# Patient Record
Sex: FEMALE | Race: WHITE | NOT HISPANIC OR LATINO | Employment: FULL TIME | URBAN - METROPOLITAN AREA
[De-identification: names, ages, dates, MRNs, and addresses within clinical notes are randomized per-mention and may not be internally consistent; named-entity substitution may affect disease eponyms.]

---

## 2017-02-03 ENCOUNTER — TELEPHONE (OUTPATIENT)
Dept: FAMILY MEDICINE | Facility: CLINIC | Age: 47
End: 2017-02-03

## 2017-02-03 NOTE — TELEPHONE ENCOUNTER
L/m, schedule pt for pap/physical or obtain info where pap was done (approx. Date, where, and results)      Letter /liu mailed to pt    Paris Kincaid MA

## 2017-02-03 NOTE — Clinical Note
February 3, 2017      Anuradha Horn  [address]  [place] [facility] [address]              Dear Anuradha Horn,    Our records show that you are due for a pap smear. Please call to schedule an appointment, [phone].  If you have had this completed somewhere else please sign and return the release of records form included, this way our system will no longer flag us that you are due.      Sincerely,    Your [facility] Care Team

## 2017-03-30 ENCOUNTER — OFFICE VISIT (OUTPATIENT)
Dept: FAMILY MEDICINE | Facility: CLINIC | Age: 47
End: 2017-03-30
Payer: COMMERCIAL

## 2017-03-30 VITALS
WEIGHT: 174.4 LBS | HEART RATE: 58 BPM | BODY MASS INDEX: 26.43 KG/M2 | RESPIRATION RATE: 12 BRPM | SYSTOLIC BLOOD PRESSURE: 122 MMHG | HEIGHT: 68 IN | OXYGEN SATURATION: 99 % | TEMPERATURE: 98.1 F | DIASTOLIC BLOOD PRESSURE: 70 MMHG

## 2017-03-30 DIAGNOSIS — Z00.00 ENCOUNTER FOR ROUTINE ADULT HEALTH EXAMINATION WITHOUT ABNORMAL FINDINGS: Primary | ICD-10-CM

## 2017-03-30 DIAGNOSIS — Z12.4 SCREENING FOR CERVICAL CANCER: ICD-10-CM

## 2017-03-30 DIAGNOSIS — Z13.21 ENCOUNTER FOR VITAMIN DEFICIENCY SCREENING: ICD-10-CM

## 2017-03-30 DIAGNOSIS — Z13.220 SCREENING FOR HYPERLIPIDEMIA: ICD-10-CM

## 2017-03-30 DIAGNOSIS — Z12.31 VISIT FOR SCREENING MAMMOGRAM: ICD-10-CM

## 2017-03-30 DIAGNOSIS — D18.00 HEMANGIOMA: ICD-10-CM

## 2017-03-30 DIAGNOSIS — L98.9 SKIN LESION: ICD-10-CM

## 2017-03-30 DIAGNOSIS — Z13.1 SCREENING FOR DIABETES MELLITUS: ICD-10-CM

## 2017-03-30 LAB
CHOLEST SERPL-MCNC: 165 MG/DL
GLUCOSE SERPL-MCNC: 80 MG/DL (ref 70–99)
HDLC SERPL-MCNC: 86 MG/DL
LDLC SERPL CALC-MCNC: 69 MG/DL
NONHDLC SERPL-MCNC: 79 MG/DL
TRIGL SERPL-MCNC: 50 MG/DL

## 2017-03-30 PROCEDURE — 80061 LIPID PANEL: CPT | Performed by: PHYSICIAN ASSISTANT

## 2017-03-30 PROCEDURE — 82306 VITAMIN D 25 HYDROXY: CPT | Performed by: PHYSICIAN ASSISTANT

## 2017-03-30 PROCEDURE — 36415 COLL VENOUS BLD VENIPUNCTURE: CPT | Performed by: PHYSICIAN ASSISTANT

## 2017-03-30 PROCEDURE — 82947 ASSAY GLUCOSE BLOOD QUANT: CPT | Performed by: PHYSICIAN ASSISTANT

## 2017-03-30 PROCEDURE — 87624 HPV HI-RISK TYP POOLED RSLT: CPT | Performed by: PHYSICIAN ASSISTANT

## 2017-03-30 PROCEDURE — G0145 SCR C/V CYTO,THINLAYER,RESCR: HCPCS | Performed by: PHYSICIAN ASSISTANT

## 2017-03-30 PROCEDURE — 99396 PREV VISIT EST AGE 40-64: CPT | Performed by: PHYSICIAN ASSISTANT

## 2017-03-30 NOTE — NURSING NOTE
"Chief Complaint   Patient presents with     Physical     fasting       Initial /70 (BP Location: Right arm, Patient Position: Chair, Cuff Size: Adult Regular)  Pulse 58  Temp 98.1  F (36.7  C) (Oral)  Resp 12  Ht 1.734 m (5' 8.25\")  Wt 79.1 kg (174 lb 6.4 oz)  LMP 04/15/2010  SpO2 99%  BMI 26.32 kg/m2 Estimated body mass index is 26.32 kg/(m^2) as calculated from the following:    Height as of this encounter: 1.734 m (5' 8.25\").    Weight as of this encounter: 79.1 kg (174 lb 6.4 oz).  Medication Reconciliation: Mason Kincaid        "

## 2017-03-30 NOTE — MR AVS SNAPSHOT
After Visit Summary   3/30/2017    Anuradha Horn    MRN: [id number]           Patient Information     Date Of Birth          1970        Visit Information        Provider Department      3/30/2017 9:40 AM Yolanda Smith PA-C [facility]        Today's Diagnoses     Screening for hyperlipidemia    -  1    Screening for diabetes mellitus        Encounter for vitamin deficiency screening        Screening for cervical cancer        Visit for screening mammogram        Skin lesion        Hemangioma          Care Instructions    Schedule mammogram at [facility] ([phone]).    Follow up with dermatology         Preventive Health Recommendations  Female Ages 40 to 49    Yearly exam:     See your health care provider every year in order to  1. Review health changes.   2. Discuss preventive care.    3. Review your medicines if your doctor prescribed any.      Get a Pap test every three years (unless you have an abnormal result and your provider advises testing more often).      If you get Pap tests with HPV test, you only need to test every 5 years, unless you have an abnormal result. You do not need a Pap test if your uterus was removed (hysterectomy) and you have not had cancer.      You should be tested each year for STDs (sexually transmitted diseases), if you're at risk.       Ask your doctor if you should have a mammogram.      Have a colonoscopy (test for colon cancer) if someone in your family has had colon cancer or polyps before age 50.       Have a cholesterol test every 5 years.       Have a diabetes test (fasting glucose) after age 45. If you are at risk for diabetes, you should have this test every 3 years.    Shots: Get a flu shot each year. Get a tetanus shot every 10 years.     Nutrition:     Eat at least 5 servings of fruits and vegetables each day.    Eat whole-grain bread, whole-wheat pasta and brown rice  instead of white grains and rice.    Talk to your provider about Calcium and Vitamin D.     Lifestyle    Exercise at least 150 minutes a week (an average of 30 minutes a day, 5 days a week). This will help you control your weight and prevent disease.    Limit alcohol to one drink per day.    No smoking.     Wear sunscreen to prevent skin cancer.    See your dentist every six months for an exam and cleaning.        Follow-ups after your visit        Additional Services     DERMATOLOGY REFERRAL       Your provider has referred you to: [facility]: Dermatology Clinic [facility] [phone]   [email or web address]  [facility]: [facility] [phone]   [email or web address]    Please be aware that coverage of these services is subject to the terms and limitations of your health insurance plan.  Call member services at your health plan with any benefit or coverage questions.      Please bring the following with you to your appointment:    (1) Any X-Rays, CTs or MRIs which have been performed.  Contact the facility where they were done to arrange for  prior to your scheduled appointment.    (2) List of current medications  (3) This referral request   (4) Any documents/labs given to you for this referral                  Future tests that were ordered for you today     Open Future Orders        Priority Expected Expires Ordered    *MA Screening Digital Bilateral Routine  3/30/2018 3/30/2017            Who to contact     If you have questions or need follow up information about today's clinic visit or your schedule please contact [facility] directly at [phone].  Normal or non-critical lab and imaging results will be communicated to you by MyChart, letter or phone within 4 business days after the clinic has received the results. If you do not hear from us within 7 days, please contact the  "clinic through [employer] or phone. If you have a critical or abnormal lab result, we will notify you by phone as soon as possible.  Submit refill requests through [employer] or call your pharmacy and they will forward the refill request to us. Please allow 3 business days for your refill to be completed.          Additional Information About Your Visit        [employer] Information     [employer] lets you send messages to your doctor, view your test results, renew your prescriptions, schedule appointments and more. To sign up, go to [email or web address] . Click on \"Log in\" on the left side of the screen, which will take you to the Welcome page. Then click on \"Sign up Now\" on the right side of the page.     You will be asked to enter the access code listed below, as well as some personal information. Please follow the directions to create your username and password.     Your access code is: [email or web address]  Expires: 2017 10:04 AM     Your access code will  in 90 days. If you need help or a new code, please call your [place] clinic or [phone].        Care EveryWhere ID     This is your Care EveryWhere ID. This could be used by other organizations to access your [place] medical records  [id number]        Your Vitals Were     Pulse Temperature Respirations Height Last Period Pulse Oximetry    58 98.1  F (36.7  C) (Oral) 12 1.734 m (5' 8.25\") 04/15/2010 99%    BMI (Body Mass Index)                   26.32 kg/m2            Blood Pressure from Last 3 Encounters:   17 122/70   16 120/79   14 104/74    Weight from Last 3 Encounters:   17 79.1 kg (174 lb 6.4 oz)   16 76 kg (167 lb 9.6 oz)   14 75.5 kg (166 lb 8 oz)              We Performed the Following     DERMATOLOGY REFERRAL     Glucose     HPV High Risk Types DNA Cervical     Lipid panel reflex to direct LDL     Pap imaged thin layer screen with HPV - recommended age 30 - 65 years (select HPV order below)     Vitamin D " Deficiency        Primary Care Provider Office Phone #    [facility] [place] [phone]       No address on file        Thank you!     Thank you for choosing [facility]  for your care. Our goal is always to provide you with excellent care. Hearing back from our patients is one way we can continue to improve our services. Please take a few minutes to complete the written survey that you may receive in the mail after your visit with us. Thank you!             Your Updated Medication List - Protect others around you: Learn how to safely use, store and throw away your medicines at www.disposemymeds.org.      Notice  As of 3/30/2017 10:04 AM    You have not been prescribed any medications.

## 2017-03-30 NOTE — PROGRESS NOTES
SUBJECTIVE:     CC: Anuradha Horn is an 46 year old woman who presents for preventive health visit.     Healthy Habits:    Do you get at least three servings of calcium containing foods daily (dairy, green leafy vegetables, etc.)? yes    Amount of exercise or daily activities, outside of work: 3 day(s) per week    Problems taking medications regularly No    Medication side effects: No    Have you had an eye exam in the past two years? yes    Do you see a dentist twice per year? yes    Do you have sleep apnea, excessive snoring or daytime drowsiness?no          Today's PHQ-2 Score:   PHQ-2 ( 1999 [employer]) 3/30/2017 7/24/2014   Q1: Little interest or pleasure in doing things 0 0   Q2: Feeling down, depressed or hopeless 0 0   PHQ-2 Score 0 0       Abuse: Current or Past(Physical, Sexual or Emotional)- No  Do you feel safe in your environment - Yes    Social History   Substance Use Topics     Smoking status: Never Smoker     Smokeless tobacco: Never Used     Alcohol use Yes      Comment: 2 drinks monthly      The patient does not drink >3 drinks per day nor >7 drinks per week.    Recent Labs   Lab Test  07/24/14   1212  05/06/10   0851   CHOL  158  183   HDL  63  74   LDL  89  99   TRIG  33  45   CHOLHDLRATIO  2.5  2.5       Reviewed orders with patient.  Reviewed health maintenance and updated orders accordingly - Yes    Mammo Decision Support:  Patient under age 50, mutual decision reflected in health maintenance.      Pertinent mammograms are reviewed under the imaging tab.  History of abnormal Pap smear: NO - age 30-65 PAP every 5 years with negative HPV co-testing recommended    Reviewed and updated as needed this visit by clinical staff  Tobacco  Allergies         Reviewed and updated as needed this visit by Provider        Periodically will have left eye twitching  No periods. No hot flashes.     ROS:  C: NEGATIVE for fever, chills, change in weight  I: NEGATIVE for worrisome rashes, moles or  "lesions  E: NEGATIVE for vision changes or irritation  ENT: NEGATIVE for ear, mouth and throat problems  R: NEGATIVE for significant cough or SOB  B: NEGATIVE for masses, tenderness or discharge  CV: NEGATIVE for chest pain, palpitations or peripheral edema  GI: NEGATIVE for nausea, abdominal pain, heartburn, or change in bowel habits  : NEGATIVE for unusual urinary or vaginal symptoms. No vaginal bleeding.  M: NEGATIVE for significant arthralgias or myalgia  N: NEGATIVE for weakness, dizziness or paresthesias  P: NEGATIVE for changes in mood or affect     Problem list, Medication list, Allergies, and Medical/Social/Surgical histories reviewed in [facility] and updated as appropriate.  OBJECTIVE:     /70 (BP Location: Right arm, Patient Position: Chair, Cuff Size: Adult Regular)  Pulse 58  Temp 98.1  F (36.7  C) (Oral)  Resp 12  Ht 1.734 m (5' 8.25\")  Wt 79.1 kg (174 lb 6.4 oz)  LMP 04/15/2010  SpO2 99%  BMI 26.32 kg/m2  EXAM:  GENERAL APPEARANCE: healthy, alert and no distress  EYES: Eyes grossly normal to inspection, PERRL and conjunctivae and sclerae normal  HENT: ear canals and TM's normal, nose and mouth without ulcers or lesions, oropharynx clear and oral mucous membranes moist  NECK: no adenopathy, no asymmetry, masses, or scars and thyroid normal to palpation  RESP: lungs clear to auscultation - no rales, rhonchi or wheezes  BREAST: normal without masses, tenderness or nipple discharge and no palpable axillary masses or adenopathy  CV: regular rate and rhythm, normal S1 S2, no S3 or S4, no murmur, click or rub, no peripheral edema and peripheral pulses strong  ABDOMEN: soft, nontender, no hepatosplenomegaly, no masses and bowel sounds normal   (female): normal female external genitalia, normal urethral meatus, vaginal mucosal atrophy noted, normal cervix, adnexae, and uterus without masses or abnormal discharge  MS: no musculoskeletal defects are noted and gait is age appropriate without " "ataxia  SKIN: no suspicious lesions or rashes except some very large hemangiomas on trunk of body and left back with irregular skin lesion- I believe has apearance of seborrheic keratosis but follow up with derm to make sure since would like derm evaluation of hemangiomas as well   NEURO: Normal strength and tone, sensory exam grossly normal, mentation intact and speech normal  PSYCH: mentation appears normal and affect normal/bright    ASSESSMENT/PLAN:     1. Encounter for routine adult health examination without abnormal findings      2. Screening for hyperlipidemia    - Lipid panel reflex to direct LDL    3. Screening for diabetes mellitus    - Glucose    4. Encounter for vitamin deficiency screening    - Vitamin D Deficiency    5. Screening for cervical cancer    - Pap imaged thin layer screen with HPV - recommended age 30 - 65 years (select HPV order below)  - HPV High Risk Types DNA Cervical    6. Visit for screening mammogram  Advised to schedule mammogram.   - *MA Screening Digital Bilateral; Future    7. Skin lesion  Of left back with some irregular features   - DERMATOLOGY REFERRAL    8. Hemangioma  Patient would like removal.    - DERMATOLOGY REFERRAL    COUNSELING:   Reviewed preventive health counseling, as reflected in patient instructions       Regular exercise       Healthy diet/nutrition       Vision screening       Osteoporosis Prevention/Bone Health         reports that she has never smoked. She has never used smokeless tobacco.    Estimated body mass index is 26.32 kg/(m^2) as calculated from the following:    Height as of this encounter: 1.734 m (5' 8.25\").    Weight as of this encounter: 79.1 kg (174 lb 6.4 oz).   Weight management plan: Discussed healthy diet and exercise guidelines and patient will follow up in 12 months in clinic to re-evaluate.    Counseling Resources:  ATP IV Guidelines  Pooled Cohorts Equation Calculator  Breast Cancer Risk Calculator  FRAX Risk Assessment  ICSI " Preventive Guidelines  Dietary Guidelines for Americans, 2010  USDA's MyPlate  ASA Prophylaxis  Lung CA Screening    Yolanda Smith PA-C  [facility]

## 2017-03-30 NOTE — PATIENT INSTRUCTIONS
Schedule mammogram at [facility] ([phone]).    Follow up with dermatology         Preventive Health Recommendations  Female Ages 40 to 49    Yearly exam:     See your health care provider every year in order to  1. Review health changes.   2. Discuss preventive care.    3. Review your medicines if your doctor prescribed any.      Get a Pap test every three years (unless you have an abnormal result and your provider advises testing more often).      If you get Pap tests with HPV test, you only need to test every 5 years, unless you have an abnormal result. You do not need a Pap test if your uterus was removed (hysterectomy) and you have not had cancer.      You should be tested each year for STDs (sexually transmitted diseases), if you're at risk.       Ask your doctor if you should have a mammogram.      Have a colonoscopy (test for colon cancer) if someone in your family has had colon cancer or polyps before age 50.       Have a cholesterol test every 5 years.       Have a diabetes test (fasting glucose) after age 45. If you are at risk for diabetes, you should have this test every 3 years.    Shots: Get a flu shot each year. Get a tetanus shot every 10 years.     Nutrition:     Eat at least 5 servings of fruits and vegetables each day.    Eat whole-grain bread, whole-wheat pasta and brown rice instead of white grains and rice.    Talk to your provider about Calcium and Vitamin D.     Lifestyle    Exercise at least 150 minutes a week (an average of 30 minutes a day, 5 days a week). This will help you control your weight and prevent disease.    Limit alcohol to one drink per day.    No smoking.     Wear sunscreen to prevent skin cancer.    See your dentist every six months for an exam and cleaning.

## 2017-03-31 LAB — DEPRECATED CALCIDIOL+CALCIFEROL SERPL-MC: 28 UG/L (ref 20–75)

## 2017-04-03 LAB
COPATH REPORT: NORMAL
PAP: NORMAL

## 2017-04-05 LAB
FINAL DIAGNOSIS: NORMAL
HPV HR 12 DNA CVX QL NAA+PROBE: NEGATIVE
HPV16 DNA SPEC QL NAA+PROBE: NEGATIVE
HPV18 DNA SPEC QL NAA+PROBE: NEGATIVE
SPECIMEN DESCRIPTION: NORMAL

## 2017-04-18 ENCOUNTER — RADIANT APPOINTMENT (OUTPATIENT)
Dept: MAMMOGRAPHY | Facility: CLINIC | Age: 47
End: 2017-04-18
Attending: PHYSICIAN ASSISTANT
Payer: COMMERCIAL

## 2017-04-18 DIAGNOSIS — Z12.31 VISIT FOR SCREENING MAMMOGRAM: ICD-10-CM

## 2017-04-18 PROCEDURE — G0202 SCR MAMMO BI INCL CAD: HCPCS

## 2018-11-05 ENCOUNTER — OFFICE VISIT (OUTPATIENT)
Dept: FAMILY MEDICINE | Facility: CLINIC | Age: 48
End: 2018-11-05
Payer: COMMERCIAL

## 2018-11-05 VITALS
DIASTOLIC BLOOD PRESSURE: 79 MMHG | HEIGHT: 69 IN | SYSTOLIC BLOOD PRESSURE: 120 MMHG | OXYGEN SATURATION: 96 % | TEMPERATURE: 98.7 F | BODY MASS INDEX: 27.16 KG/M2 | WEIGHT: 183.4 LBS | RESPIRATION RATE: 18 BRPM | HEART RATE: 62 BPM

## 2018-11-05 DIAGNOSIS — G43.109 MIGRAINE WITH AURA AND WITHOUT STATUS MIGRAINOSUS, NOT INTRACTABLE: ICD-10-CM

## 2018-11-05 DIAGNOSIS — N95.2 ATROPHIC VAGINITIS: ICD-10-CM

## 2018-11-05 DIAGNOSIS — R94.6 ABNORMAL THYROID EXAM: ICD-10-CM

## 2018-11-05 DIAGNOSIS — Z23 NEED FOR PROPHYLACTIC VACCINATION AND INOCULATION AGAINST INFLUENZA: ICD-10-CM

## 2018-11-05 DIAGNOSIS — Z00.00 ENCOUNTER FOR ROUTINE ADULT HEALTH EXAMINATION WITHOUT ABNORMAL FINDINGS: Primary | ICD-10-CM

## 2018-11-05 DIAGNOSIS — K80.20 CALCULUS OF GALLBLADDER WITHOUT CHOLECYSTITIS WITHOUT OBSTRUCTION: ICD-10-CM

## 2018-11-05 LAB
CHOLEST SERPL-MCNC: 171 MG/DL
GLUCOSE SERPL-MCNC: 86 MG/DL (ref 70–99)
HDLC SERPL-MCNC: 77 MG/DL
LDLC SERPL CALC-MCNC: 83 MG/DL
NONHDLC SERPL-MCNC: 94 MG/DL
TRIGL SERPL-MCNC: 57 MG/DL
TSH SERPL DL<=0.005 MIU/L-ACNC: 1.39 MU/L (ref 0.4–4)

## 2018-11-05 PROCEDURE — 99396 PREV VISIT EST AGE 40-64: CPT | Performed by: FAMILY MEDICINE

## 2018-11-05 PROCEDURE — 80061 LIPID PANEL: CPT | Performed by: FAMILY MEDICINE

## 2018-11-05 PROCEDURE — 36415 COLL VENOUS BLD VENIPUNCTURE: CPT | Performed by: FAMILY MEDICINE

## 2018-11-05 PROCEDURE — 82947 ASSAY GLUCOSE BLOOD QUANT: CPT | Performed by: FAMILY MEDICINE

## 2018-11-05 PROCEDURE — 84443 ASSAY THYROID STIM HORMONE: CPT | Performed by: FAMILY MEDICINE

## 2018-11-05 RX ORDER — ESTRADIOL 0.1 MG/G
CREAM VAGINAL
Qty: 42.5 G | Refills: 3 | Status: SHIPPED | OUTPATIENT
Start: 2018-11-05 | End: 2020-01-29

## 2018-11-05 ASSESSMENT — PAIN SCALES - GENERAL: PAINLEVEL: NO PAIN (0)

## 2018-11-05 NOTE — PATIENT INSTRUCTIONS
"Use Estrace cream nightly for a couple weeks and then after that use a couple times a week for maintenance.     Please call [facility] (formerly called [facility]) at [phone] to schedule your mammogram and thyroid ultrasound.     I recommend keeping a food and activity diary. Keeping track of what you eat will help you to be more aware of the choices you're making and can assist with weight loss. There are many phone apps to do this, a popular one is [employer]. It will break down what you eat into carbs, fat, and protein and help you set goals.     I recommend a Vitamin D supplement since it's our \"sun vitamin\" and most [place] don't get enough sun. You can find it over the counter. Take 1000 units daily.         Preventive Health Recommendations  Female Ages 40 to 49    Yearly exam:     See your health care provider every year in order to  1. Review health changes.   2. Discuss preventive care.    3. Review your medicines if your doctor prescribed any.      Get a Pap test every three years (unless you have an abnormal result and your provider advises testing more often).      If you get Pap tests with HPV test, you only need to test every 5 years, unless you have an abnormal result. You do not need a Pap test if your uterus was removed (hysterectomy) and you have not had cancer.      You should be tested each year for STDs (sexually transmitted diseases), if you're at risk.     Ask your doctor if you should have a mammogram.      Have a colonoscopy (test for colon cancer) if someone in your family has had colon cancer or polyps before age 50.       Have a cholesterol test every 5 years.       Have a diabetes test (fasting glucose) after age 45. If you are at risk for diabetes, you should have this test every 3 years.    Shots: Get a flu shot each year. Get a tetanus shot every 10 years.     Nutrition:     Eat at least 5 servings of fruits and vegetables " each day.    Eat whole-grain bread, whole-wheat pasta and brown rice instead of white grains and rice.    Get adequate Calcium and Vitamin D.      Lifestyle    Exercise at least 150 minutes a week (an average of 30 minutes a day, 5 days a week). This will help you control your weight and prevent disease.    Limit alcohol to one drink per day.    No smoking.     Wear sunscreen to prevent skin cancer.    See your dentist every six months for an exam and cleaning.

## 2018-11-05 NOTE — PROGRESS NOTES
"   SUBJECTIVE:   CC: Anuradha Horn is an 48 year old woman who presents for preventive health visit.     Healthy Habits:    Do you get at least three servings of calcium containing foods daily (dairy, green leafy vegetables, etc.)? yes    Amount of exercise or daily activities, outside of work: 3 day(s) per week    Problems taking medications regularly No    Medication side effects: No    Have you had an eye exam in the past two years? yes    Do you see a dentist twice per year? yes    Do you have sleep apnea, excessive snoring or daytime drowsiness?no    :  -Patient had episode of right flank pain and dysuria and was treated for UTI. She was also seen in ED where she was told she had gallstones but no kidney stones.  She has no current symptoms  -Given estrace cream by OBGYN.  Found the cream was helpful for dryness but has run out and is interested in a refill    Migraines:  -Continue to be managed with Excedrin. Stress triggers migraines. No change in intensity or associated symptoms   -Very occasionally has aura with migraine, described as \"seeing stars\"     Weight:  -Patient feels it has been more challenging to lose weight since menopause  -In the past patient could manage weight with diet but feels she now needs more exercise too. She uses the treadmill very occasionally     Wt Readings from Last 4 Encounters:   11/05/18 83.2 kg (183 lb 6.4 oz)   03/30/17 79.1 kg (174 lb 6.4 oz)   04/21/16 76 kg (167 lb 9.6 oz)   07/24/14 75.5 kg (166 lb 8 oz)         Today's PHQ-2 Score:   PHQ-2 ( 1999 Pfizer) 3/30/2017 7/24/2014   Q1: Little interest or pleasure in doing things 0 0   Q2: Feeling down, depressed or hopeless 0 0   PHQ-2 Score 0 0       Abuse: Current or Past(Physical, Sexual or Emotional)- No  Do you feel safe in your environment - Yes    Social History   Substance Use Topics     Smoking status: Never Smoker     Smokeless tobacco: Never Used     Alcohol use Yes      Comment: 2 drinks monthly  "     If you drink alcohol do you typically have >3 drinks per day or >7 drinks per week? No                     Reviewed orders with patient.  Reviewed health maintenance and updated orders accordingly - Yes  Patient Active Problem List   Diagnosis     CARDIOVASCULAR SCREENING; LDL GOAL LESS THAN 160     Migraines     Past Surgical History:   Procedure Laterality Date     D & C      following artifiscial insemination     SURGICAL HISTORY OF -   age 14    right hip slipped epiphysis       Social History   Substance Use Topics     Smoking status: Never Smoker     Smokeless tobacco: Never Used     Alcohol use Yes      Comment: 2 drinks monthly      Family History   Problem Relation Age of Onset     Hypertension Father      Thyroid Disease Mother      Hypertension Mother      Hypertension Paternal Grandmother      Asthma No family hx of      C.A.D. No family hx of      Diabetes No family hx of      Cerebrovascular Disease No family hx of      Breast Cancer No family hx of      Cancer - colorectal No family hx of      Prostate Cancer No family hx of            Patient under age 50, mutual decision reflected in health maintenance.      Pertinent mammograms are reviewed under the imaging tab.  History of abnormal Pap smear: NO - age 30-65 PAP every 5 years with negative HPV co-testing recommended  PAP / HPV Latest Ref Rng & Units 3/30/2017   PAP - NIL   HPV 16 DNA NEG Negative   HPV 18 DNA NEG Negative   OTHER HR HPV NEG Negative     Reviewed and updated as needed this visit by clinical staff  Tobacco  Allergies  Meds  Med Hx  Surg Hx  Fam Hx  Soc Hx        Reviewed and updated as needed this visit by Provider        Past Medical History:   Diagnosis Date     Infertility female      Normal delivery 05      Past Surgical History:   Procedure Laterality Date     D & C      following artifiscial insemination     SURGICAL HISTORY OF -   age 14    right hip slipped epiphysis       ROS:  Constitutional, HEENT,  "cardiovascular, pulmonary, GI, , musculoskeletal, neuro, skin, endocrine and psych systems are negative, except as otherwise noted.    This document serves as a record of the services and decisions personally performed by IGOR WILKINS. It was created on his/her behalf by Ran Zaidi, a trained medical scribe. The creation of this document is based on the provider's statements to the medical scribe. Ran Zaidi, November 5, 2018 9:21 AM  OBJECTIVE:   /79 (BP Location: Right arm, Patient Position: Sitting, Cuff Size: Adult Regular)  Pulse 62  Temp 98.7  F (37.1  C) (Oral)  Resp 18  Ht 1.74 m (5' 8.5\")  Wt 83.2 kg (183 lb 6.4 oz)  LMP 04/15/2010  SpO2 96%  BMI 27.48 kg/m2  EXAM:  GENERAL APPEARANCE: healthy, alert and no distress  EYES: Eyes grossly normal to inspection, PERRL and conjunctivae and sclerae normal  HENT: ear canals and TM's normal, nose and mouth without ulcers or lesions, oropharynx clear and oral mucous membranes moist  NECK: shotty lymphadenopathy, no asymmetry, masses, or scars. fullness of right lateral thyroid area unclear if enlarged lymph node vs thyroid nodule  RESP: lungs clear to auscultation - no rales, rhonchi or wheezes  BREAST: normal without masses, tenderness or nipple discharge and no palpable axillary masses or adenopathy  CV: regular rate and rhythm, normal S1 S2, no S3 or S4, no murmur, click or rub, no peripheral edema and peripheral pulses strong  ABDOMEN: soft, nontender, no hepatosplenomegaly, no masses and bowel sounds normal   (female): normal female external genitalia, normal urethral meatus, vaginal mucosal atrophy noted, normal cervix, adnexae, and uterus without masses or abnormal discharge  MS: no musculoskeletal defects are noted and gait is age appropriate without ataxia  SKIN: scattered hemangiomas. no suspicious lesions or rashes  NEURO: Normal strength and tone, sensory exam grossly normal, mentation intact and speech normal  PSYCH: " "mentation appears normal and affect normal/bright    ASSESSMENT/PLAN:   1. Encounter for routine adult health examination without abnormal findings  Declined HIV screening as patient has previous negative screen and no risk factors   - Lipid panel reflex to direct LDL Fasting  - Glucose  - TSH with free T4 reflex    2. Calculus of gallbladder without cholecystitis without obstruction  Reviewed asymptomatic, symptoms of biliary colic and reasons for follow-up including emergent follow-up    3. Atrophic vaginitis   improved with estrogen cream. Use cream as needed   - estradiol (ESTRACE VAGINAL) 0.1 MG/GM cream; 1 gm PV Q HS x 2 weeks, then 2-3 x's weekly thereafter  Dispense: 42.5 g; Refill: 3    4. Migraine with aura and without status migrainosus, not intractable  Stable. Controlled with Excedrin as needed     5. Abnormal thyroid exam  Thyroid nodule vs enlarged lymph node, US to eval   - US Thyroid; Future    6. Need for prophylactic vaccination and inoculation against influenza    COUNSELING:   Reviewed preventive health counseling, as reflected in patient instructions  Special attention given to:        Regular exercise       Healthy diet/nutrition       Vision screening       Hearing screening       HIV screeninx in teen years, 1x in adult years, and at intervals if high risk    BP Readings from Last 1 Encounters:   18 120/79     Estimated body mass index is 27.48 kg/(m^2) as calculated from the following:    Height as of this encounter: 1.74 m (5' 8.5\").    Weight as of this encounter: 83.2 kg (183 lb 6.4 oz).      Weight management plan: Discussed healthy diet and exercise guidelines and patient will follow up in 12 months in clinic to re-evaluate.     reports that she has never smoked. She has never used smokeless tobacco.    Patient Instructions   Use Estrace cream nightly for a couple weeks and then after that use a couple times a week for maintenance.     Please call [facility] [place] [facility] at [phone] to schedule your mammogram.     I recommend keeping a food and activity diary. Keeping track of what you eat will help you to be more aware of the choices you're making and can assist with weight loss. There are many phone apps to do this, a popular one is BanyanPal. It will break down what you eat into carbs, fat, and protein and help you set goals.     I recommend a Vitamin D supplement since it's our \"sun vitamin\" and most [place] don't get enough sun. You can find it over the counter. Take 1000 units daily.         Preventive Health Recommendations  Female Ages 40 to 49    Yearly exam:     See your health care provider every year in order to  1. Review health changes.   2. Discuss preventive care.    3. Review your medicines if your doctor prescribed any.      Get a Pap test every three years (unless you have an abnormal result and your provider advises testing more often).      If you get Pap tests with HPV test, you only need to test every 5 years, unless you have an abnormal result. You do not need a Pap test if your uterus was removed (hysterectomy) and you have not had cancer.      You should be tested each year for STDs (sexually transmitted diseases), if you're at risk.     Ask your doctor if you should have a mammogram.      Have a colonoscopy (test for colon cancer) if someone in your family has had colon cancer or polyps before age 50.       Have a cholesterol test every 5 years.       Have a diabetes test (fasting glucose) after age 45. If you are at risk for diabetes, you should have this test every 3 years.    Shots: Get a flu shot each year. Get a tetanus shot every 10 years.     Nutrition:     Eat at least 5 servings of fruits and vegetables each day.    Eat whole-grain bread, whole-wheat pasta and brown rice instead of white grains and rice.    Get adequate Calcium and Vitamin D.      Lifestyle    Exercise at " least 150 minutes a week (an average of 30 minutes a day, 5 days a week). This will help you control your weight and prevent disease.    Limit alcohol to one drink per day.    No smoking.     Wear sunscreen to prevent skin cancer.    See your dentist every six months for an exam and cleaning.    Counseling Resources:  ATP IV Guidelines  Pooled Cohorts Equation Calculator  Breast Cancer Risk Calculator  FRAX Risk Assessment  ICSI Preventive Guidelines  Dietary Guidelines for Americans, 2010  USDA's MyPlate  ASA Prophylaxis  Lung CA Screening    The information in this document, created by the medical scribe for me, accurately reflects the services I personally performed and the decisions made by me. I have reviewed and approved this document for accuracy.   Laine Canas MD  [facility]

## 2018-11-05 NOTE — MR AVS SNAPSHOT
"              After Visit Summary   11/5/2018    Anuradha Horn    MRN: [id number]           Patient Information     Date Of Birth          1970        Visit Information        Provider Department      11/5/2018 8:40 AM Laine Canas MD [facility]        Today's Diagnoses     Encounter for routine adult health examination without abnormal findings    -  1    Calculus of gallbladder without cholecystitis without obstruction        Screening for HIV (human immunodeficiency virus)        Need for prophylactic vaccination and inoculation against influenza        Atrophic vaginitis        Migraine with aura and without status migrainosus, not intractable        Abnormal thyroid exam          Care Instructions    Use Estrace cream nightly for a couple weeks and then after that use a couple times a week for maintenance.     Please call [facility] at [phone] to schedule your mammogram and thyroid ultrasound.     I recommend keeping a food and activity diary. Keeping track of what you eat will help you to be more aware of the choices you're making and can assist with weight loss. There are many phone apps to do this, a popular one is [employer]. It will break down what you eat into carbs, fat, and protein and help you set goals.     I recommend a Vitamin D supplement since it's our \"sun vitamin\" and most [place] don't get enough sun. You can find it over the counter. Take 1000 units daily.         Preventive Health Recommendations  Female Ages 40 to 49    Yearly exam:     See your health care provider every year in order to  1. Review health changes.   2. Discuss preventive care.    3. Review your medicines if your doctor prescribed any.      Get a Pap test every three years (unless you have an abnormal result and your provider advises testing more often).      If you get Pap tests with HPV " test, you only need to test every 5 years, unless you have an abnormal result. You do not need a Pap test if your uterus was removed (hysterectomy) and you have not had cancer.      You should be tested each year for STDs (sexually transmitted diseases), if you're at risk.     Ask your doctor if you should have a mammogram.      Have a colonoscopy (test for colon cancer) if someone in your family has had colon cancer or polyps before age 50.       Have a cholesterol test every 5 years.       Have a diabetes test (fasting glucose) after age 45. If you are at risk for diabetes, you should have this test every 3 years.    Shots: Get a flu shot each year. Get a tetanus shot every 10 years.     Nutrition:     Eat at least 5 servings of fruits and vegetables each day.    Eat whole-grain bread, whole-wheat pasta and brown rice instead of white grains and rice.    Get adequate Calcium and Vitamin D.      Lifestyle    Exercise at least 150 minutes a week (an average of 30 minutes a day, 5 days a week). This will help you control your weight and prevent disease.    Limit alcohol to one drink per day.    No smoking.     Wear sunscreen to prevent skin cancer.    See your dentist every six months for an exam and cleaning.          Follow-ups after your visit        Future tests that were ordered for you today     Open Future Orders        Priority Expected Expires Ordered    US Thyroid Routine  11/5/2019 11/5/2018            Who to contact     If you have questions or need follow up information about today's clinic visit or your schedule please contact [facility] directly at [phone].  Normal or non-critical lab and imaging results will be communicated to you by MyChart, letter or phone within 4 business days after the clinic has received the results. If you do not hear from us within 7 days, please contact the clinic through MyChart or phone. If you have a critical or abnormal lab result, we will notify you  "by phone as soon as possible.  Submit refill requests through [employer] or call your pharmacy and they will forward the refill request to us. Please allow 3 business days for your refill to be completed.          Additional Information About Your Visit        [employer] Information     S[employer]ana lets you send messages to your doctor, view your test results, renew your prescriptions, schedule appointments and more. To sign up, go to [email or web address] . Click on \"Log in\" on the left side of the screen, which will take you to the Welcome page. Then click on \"Sign up Now\" on the right side of the page.     You will be asked to enter the access code listed below, as well as some personal information. Please follow the directions to create your username and password.     Your access code is: [email or web address]  Expires: 2/3/2019  8:25 AM     Your access code will  in 90 days. If you need help or a new code, please call your [facility] clinic or [phone].        Care EveryWhere ID     This is your Care EveryWhere ID. This could be used by other organizations to access your [facility] medical records  [id number]        Your Vitals Were     Pulse Temperature Respirations Height Last Period Pulse Oximetry    62 98.7  F (37.1  C) (Oral) 18 1.74 m (5' 8.5\") 04/15/2010 96%    BMI (Body Mass Index)                   27.48 kg/m2            Blood Pressure from Last 3 Encounters:   18 120/79   17 122/70   16 120/79    Weight from Last 3 Encounters:   18 83.2 kg (183 lb 6.4 oz)   17 79.1 kg (174 lb 6.4 oz)   16 76 kg (167 lb 9.6 oz)              We Performed the Following     Glucose     Lipid panel reflex to direct LDL Fasting     TSH with free T4 reflex          Today's Medication Changes          These changes are accurate as of 18  9:49 AM.  If you have any questions, ask your nurse or doctor.               Start taking these medicines.        Dose/Directions    estradiol 0.1 MG/GM cream "   Commonly known as:  ESTRACE VAGINAL   Used for:  Atrophic vaginitis   Started by:  Laine Canas MD        1 gm PV Q HS x 2 weeks, then 2-3 x's weekly thereafter   Quantity:  42.5 g   Refills:  3            Where to get your medicines      These medications were sent to [facility]/pharmacy #[id number] - [place], [place] - [address] [place] AT [facility]  [address] [facility] [address]     Phone:  [phone]     estradiol 0.1 MG/GM cream                Primary Care Provider Office Phone # Fax #    [facility] [phone] [address] [address]        Equal Access to Services     LUNA TRAVIS : Hadii aad ku hadasho Soomaali, waaxda luqadaha, qaybta kaalmada adeegyada, fatimah perry. So [facility] [phone].    ATENCIÓN: Si habla español, tiene a small disposición servicios gratuitos de asistencia lingüística. [facility] [phone].    We comply with applicable federal civil rights laws and [place] laws. We do not discriminate on the basis of race, color, national origin, age, disability, sex, sexual orientation, or gender identity.            Thank you!     Thank you for choosing [facility]  for your care. Our goal is always to provide you with excellent care. Hearing back from our patients is one way we can continue to improve our services. Please take a few minutes to complete the written survey that you may receive in the mail after your visit with us. Thank you!             Your Updated Medication List - Protect others around you: Learn how to safely use, store and throw away your medicines at www.disposemymeds.org.          This list is accurate as of 11/5/18  9:49 AM.  Always use your most recent med list.                   Brand Name Dispense Instructions for use Diagnosis    estradiol 0.1 MG/GM cream    ESTRACE VAGINAL    42.5 g    1 gm PV Q HS x 2 weeks, then 2-3 x's weekly  thereafter    Atrophic vaginitis

## 2018-11-06 PROBLEM — N95.2 ATROPHIC VAGINITIS: Status: ACTIVE | Noted: 2018-11-06

## 2018-11-07 ENCOUNTER — RADIANT APPOINTMENT (OUTPATIENT)
Dept: ULTRASOUND IMAGING | Facility: CLINIC | Age: 48
End: 2018-11-07
Attending: FAMILY MEDICINE
Payer: COMMERCIAL

## 2018-11-07 ENCOUNTER — RADIANT APPOINTMENT (OUTPATIENT)
Dept: MAMMOGRAPHY | Facility: CLINIC | Age: 48
End: 2018-11-07
Attending: FAMILY MEDICINE
Payer: COMMERCIAL

## 2018-11-07 DIAGNOSIS — R94.6 ABNORMAL THYROID EXAM: ICD-10-CM

## 2018-11-07 DIAGNOSIS — Z12.31 VISIT FOR SCREENING MAMMOGRAM: ICD-10-CM

## 2018-11-07 PROCEDURE — 77063 BREAST TOMOSYNTHESIS BI: CPT

## 2018-11-07 PROCEDURE — 77067 SCR MAMMO BI INCL CAD: CPT

## 2018-11-07 PROCEDURE — 76536 US EXAM OF HEAD AND NECK: CPT

## 2018-11-08 DIAGNOSIS — E04.1 THYROID NODULE: Primary | ICD-10-CM

## 2018-11-14 ENCOUNTER — RADIANT APPOINTMENT (OUTPATIENT)
Dept: ULTRASOUND IMAGING | Facility: CLINIC | Age: 48
End: 2018-11-14
Attending: FAMILY MEDICINE
Payer: COMMERCIAL

## 2018-11-14 DIAGNOSIS — E04.1 THYROID NODULE: ICD-10-CM

## 2018-11-14 PROCEDURE — 76942 ECHO GUIDE FOR BIOPSY: CPT

## 2018-11-14 PROCEDURE — 88173 CYTOPATH EVAL FNA REPORT: CPT

## 2018-11-14 PROCEDURE — 00000102 ZZHCL STATISTIC CYTO WRIGHT STAIN TC

## 2018-11-14 PROCEDURE — 10022 US BIOPSY THYROID FINE NEEDLE ASPIRATION: CPT

## 2018-11-14 RX ORDER — LIDOCAINE HYDROCHLORIDE 10 MG/ML
9 INJECTION, SOLUTION INFILTRATION; PERINEURAL ONCE
Status: COMPLETED | OUTPATIENT
Start: 2018-11-14 | End: 2018-11-14

## 2018-11-14 RX ADMIN — Medication 1 MEQ: at 15:11

## 2018-11-14 RX ADMIN — LIDOCAINE HYDROCHLORIDE 9 ML: 10 INJECTION, SOLUTION INFILTRATION; PERINEURAL at 15:10

## 2018-11-14 NOTE — PROGRESS NOTES
Anuradha was seen in the ultrasound today for a fine needle aspiration of a right thyroid nodule. Procedure was explained and consent was obtained. The entire neck was prepped and sterile drape was applied. 9 ml Lidocaine (NDC 4127-6335-07) with 1 ml 8.4% Na Bicarbonate (NDC 2633-2586-50) was used to anesthetize the skin and subcutaneous tissue.  Procedure was performed with no complications.  Pain at start of procedure 0/10. Pain at end of procedure 0/10  Patient d/c with home care and follow-up instructions.  Procedure performed by: Dr Hill  Other staff present: Petrona

## 2018-11-15 DIAGNOSIS — E04.1 THYROID NODULE: Primary | ICD-10-CM

## 2018-11-15 LAB — COPATH REPORT: NORMAL

## 2018-11-28 ENCOUNTER — OFFICE VISIT (OUTPATIENT)
Dept: DERMATOLOGY | Facility: CLINIC | Age: 48
End: 2018-11-28
Payer: COMMERCIAL

## 2018-11-28 VITALS — DIASTOLIC BLOOD PRESSURE: 80 MMHG | OXYGEN SATURATION: 98 % | SYSTOLIC BLOOD PRESSURE: 119 MMHG | HEART RATE: 60 BPM

## 2018-11-28 DIAGNOSIS — D22.9 MULTIPLE NEVI: ICD-10-CM

## 2018-11-28 DIAGNOSIS — L81.4 LENTIGINES: ICD-10-CM

## 2018-11-28 DIAGNOSIS — D18.01 CHERRY ANGIOMA: ICD-10-CM

## 2018-11-28 DIAGNOSIS — D48.5 NEOPLASM OF UNCERTAIN BEHAVIOR OF SKIN: Primary | ICD-10-CM

## 2018-11-28 DIAGNOSIS — Z80.8 FAMILY HISTORY OF SKIN CANCER: ICD-10-CM

## 2018-11-28 PROCEDURE — 99213 OFFICE O/P EST LOW 20 MIN: CPT | Mod: 25 | Performed by: PHYSICIAN ASSISTANT

## 2018-11-28 PROCEDURE — 11100 HC BIOPSY SKIN/SUBQ/MUC MEM, SINGLE LESION: CPT | Performed by: PHYSICIAN ASSISTANT

## 2018-11-28 PROCEDURE — 88305 TISSUE EXAM BY PATHOLOGIST: CPT | Mod: TC | Performed by: PHYSICIAN ASSISTANT

## 2018-11-28 NOTE — MR AVS SNAPSHOT
After Visit Summary   11/28/2018    Anuradha Horn    MRN: [id number]           Patient Information     Date Of Birth          1970        Visit Information        Provider Department      11/28/2018 11:00 AM Madelyn Angel PA-C [facility]        Today's Diagnoses     Neoplasm of uncertain behavior of skin    -  1    Cherry angioma        Multiple nevi        Lentigines          Care Instructions    Consider botox and/or fillers.  Begin vitamin C and E serum with ferulic acid in the morning  Begin tretinoin(RX) or Differin (OTC): Apply a pea-sized amount to each affected area. Start every 3-4 nights working up to every night.   [employer]mar[employer] cosmeceuticals for products      Wear a sunscreen with at least SPF 30 on your face, ears, neck and V of the chest daily. Wear sunscreen on other areas of the body if those areas are exposed to the sun throughout the day. Sunscreens can contain physical and/or chemical blockers. Physical blockers are less likely to clog pores, these include zinc oxide and titanium dioxide. Reapply every two hour and after swimming. Sunscreen examples include Neutrogena, CeraVe, Blue Lizard, Elta MD and many others.            Proper skin care from [facility]:    -Eliminate harsh soaps as they strip the natural oils from the skin, often resulting in dry itchy skin ( i.e. Dial, Zest, [place] Spring)  -Use mild soaps such as Cetaphil or Dove Sensitive Skin in the shower. You do not need to use soap on arms, legs, and trunk every time you shower unless visibly soiled.   -Avoid hot or cold showers.  -After showering, lightly dry off and apply moisturizing within 2-3 minutes. This will help trap moisture in the skin.   -Aggressive use of a moisturizer at least 1-2 times a day to the entire body (including -Vanicream, Cetaphil, Aquaphor or Cerave) and moisturize hands after every washing.  -We recommend using moisturizers that come in a tub  that needs to be scooped out, not a pump. This has more of an oil base. It will hold moisture in your skin much better than a water base moisturizer. The above recommended are non-pore clogging.      Wear a sunscreen with at least SPF 30 on your face, ears, neck and V of the chest daily. Wear sunscreen on other areas of the body if those areas are exposed to the sun throughout the day. Sunscreens can contain physical and/or chemical blockers. Physical blockers are less likely to clog pores, these include zinc oxide and titanium dioxide. Reapply every two hour and after swimming. Sunscreen examples include Neutrogena, CeraVe, Blue Lizard, Elta MD and many others.    UV radiation  UVA radiation remains constant throughout the day and throughout the year. It is a longer wavelength than UVB and therefore penetrates deeper into the skin leading to immediate and delayed tanning, photoaging, and skin cancer. 70-80% of UVA and UVB radiation occurs between the hours of 10am-2pm.  UVB radiation  UVB radiation causes the most harmful effects and is more significant during the summer months. However, snow and ice can reflect UVB radiation leading to skin damage during the winter months as well. UVB radiation is responsible for tanning, burning, inflammation, delayed erythema (pinkness), pigmentation (brown spots), and skin cancer.   Just because you do not burn or are not developing a tan does not mean that you are not damaging your skin. A 15 minute drive to and from work for 30 years an lead to chronic sun damage of the skin. It is important to wear a broad spectrum (both UVA and UVB) sunscreen EVERY day with at least 30 SPF. Apply to face, ears, neck and v of the chest as this is where most of our sun exposure is. Reapply sunscreen every two hours if you plan on being outside.   Yeison Dewitt. Clinical Dermatology: A Color Guide to Diagnosis and Therapy. Elsevier, 2016.                  Wound Care Instructions     FOR  SUPERFICIAL WOUNDS     [facility] [phone]    [facility] [phone]          AFTER 24 HOURS YOU SHOULD REMOVE THE BANDAGE AND BEGIN DAILY DRESSING CHANGES AS FOLLOWS:     1) Remove Dressing.     2) Clean and dry the area with tap water using a Q-tip or sterile gauze pad.     3) Apply Vaseline, Aquaphor, Polysporin ointment or Bacitracin ointment over entire wound.  Do NOT use Neosporin ointment.     4) Cover the wound with a band-aid, or a sterile non-stick gauze pad and micropore paper tape      REPEAT THESE INSTRUCTIONS AT LEAST ONCE A DAY UNTIL THE WOUND HAS COMPLETELY HEALED.    It is an old wives tale that a wound heals better when it is exposed to air and allowed to dry out. The wound will heal faster with a better cosmetic result if it is kept moist with ointment and covered with a bandage.    **Do not let the wound dry out.**      Supplies Needed:      *Cotton tipped applicators (Q-tips)    *Polysporin Ointment or Bacitracin Ointment (NOT NEOSPORIN)    *Band-aids or non-stick gauze pads and micropore paper tape.      PATIENT INFORMATION:    During the healing process you will notice a number of changes. All wounds develop a small halo of redness surrounding the wound.  This means healing is occurring. Severe itching with extensive redness usually indicates sensitivity to the ointment or bandage tape used to dress the wound.  You should call our office if this develops.      Swelling  and/or discoloration around your surgical site is common, particularly when performed around the eye.    All wounds normally drain.  The larger the wound the more drainage there will be.  After 7-10 days, you will notice the wound beginning to shrink and new skin will begin to grow.  The wound is healed when you can see skin has formed over the entire area.  A healed wound has a healthy, shiny look to the surface and is red to dark pink in color to normalize.  Wounds may take approximately 4-6  weeks to heal.  Larger wounds may take 6-8 weeks.  After the wound is healed you may discontinue dressing changes.    You may experience a sensation of tightness as your wound heals. This is normal and will gradually subside.    Your healed wound may be sensitive to temperature changes. This sensitivity improves with time, but if you re having a lot of discomfort, try to avoid temperature extremes.    Patients frequently experience itching after their wound appears to have healed because of the continue healing under the skin.  Plain Vaseline will help relieve the itching.        POSSIBLE COMPLICATIONS    BLEEDIN. Leave the bandage in place.  2. Use tightly rolled up gauze or a cloth to apply direct pressure over the bandage for 30  minutes.  3. Reapply pressure for an additional 30 minutes if necessary  4. Use additional gauze and tape to maintain pressure once the bleeding has stopped.              Follow-ups after your visit        Your next 10 appointments already scheduled     2019  4:00 PM CST   New Visit with David [facility]ard MD   [facility]    [address] [place] [id number] [phone]              Who to contact     If you have questions or need follow up information about today's clinic visit or your schedule please contact [facility] directly at [phone].  Normal or non-critical lab and imaging results will be communicated to you by MyChart, letter or phone within 4 business days after the clinic has received the results. If you do not hear from us within 7 days, please contact the clinic through MyChart or phone. If you have a critical or abnormal lab result, we will notify you by phone as soon as possible.  Submit refill requests through [employer] or call your pharmacy and they will forward the refill request to us. Please allow 3 business days for your refill to be completed.           Additional Information About Your Visit        MyChart Information     [employer] gives you secure access to your electronic health record. If you see a primary care provider, you can also send messages to your care team and make appointments. If you have questions, please call your primary care clinic.  If you do not have a primary care provider, please call [phone] and they will assist you.        Care EveryWhere ID     This is your Care EveryWhere ID. This could be used by other organizations to access your [facility] medical records  [id number]        Your Vitals Were     Pulse Last Period Pulse Oximetry             60 04/15/2010 98%          Blood Pressure from Last 3 Encounters:   11/28/18 119/80   11/05/18 120/79   03/30/17 122/70    Weight from Last 3 Encounters:   11/05/18 83.2 kg (183 lb 6.4 oz)   03/30/17 79.1 kg (174 lb 6.4 oz)   04/21/16 76 kg (167 lb 9.6 oz)              We Performed the Following     BIOPSY SKIN/SUBQ/MUC MEM, SINGLE LESION     Dermatological path order and indications        Primary Care Provider Office Phone # Fax #    [facility] [phone] [address] [facility] [address]        Equal Access to Services     LUNA TRAVIS : Hadii aad ku hadasho Soomaali, waaxda luqadaha, qaybta kaalmada adeegyada, waxay idiin hayluannen royce perry. So [facility] [phone].    ATENCIÓN: Si habla español, tiene a small disposición servicios gratuitos de asistencia lingüística. Llame al [phone].    We comply with applicable federal civil rights laws and [place] laws. We do not discriminate on the basis of race, color, national origin, age, disability, sex, sexual orientation, or gender identity.            Thank you!     Thank you for choosing [facility]  for your care. Our goal is always to provide you with excellent care. Hearing back from our patients is one way we can continue to improve our services. Please take a few  minutes to complete the written survey that you may receive in the mail after your visit with us. Thank you!             Your Updated Medication List - Protect others around you: Learn how to safely use, store and throw away your medicines at www.disposemymeds.org.          This list is accurate as of 11/28/18 11:32 AM.  Always use your most recent med list.                   Brand Name Dispense Instructions for use Diagnosis    estradiol 0.1 MG/GM vaginal cream    ESTRACE VAGINAL    42.5 g    1 gm PV Q HS x 2 weeks, then 2-3 x's weekly thereafter    Atrophic vaginitis       VITAMIN C PO       Neoplasm of uncertain behavior of skin

## 2018-11-28 NOTE — LETTER
11/28/2018         RE: Anuradha Horn  [address]  [place] [address]        Dear Colleague,    Thank you for referring your patient, Anuradha Horn, to the [facility]. Please see a copy of my visit note below.    HPI:  Anuradha Horn is a 48 year old female patient here today for spot on right thigh .  Patient states this has been present for years.  Patient reports the following symptoms: growing and rubs on pants .  Patient reports the following previous treatments: none.  Patient reports the following modifying factors: none.  Associated symptoms: none. States she has some signs of aging on her face and would like to discuss cosmetic treatments..  Patient has no other skin complaints today.  Remainder of the HPI, Meds, PMH, Allergies, FH, and SH was reviewed in chart.    Pertinent Hx:   Father had skin cancer and mother has precancers. No personal history of skin cancer.   Past Medical History:   Diagnosis Date     Infertility female      Normal delivery 05       Past Surgical History:   Procedure Laterality Date     D & C      following artifiscial insemination     SURGICAL HISTORY OF -   age 14    right hip slipped epiphysis        Family History   Problem Relation Age of Onset     Hypertension Father      Thyroid Disease Mother      Hypertension Mother      Breast Cancer Mother 75     Hypertension Paternal Grandmother      Asthma No family hx of      C.A.D. No family hx of      Diabetes No family hx of      Cerebrovascular Disease No family hx of      Cancer - colorectal No family hx of      Prostate Cancer No family hx of        Social History     Social History     Marital status:      Spouse name: N/A     Number of children: 2     Years of education: N/A     Occupational History     dentist [employer]     Social History Main Topics     Smoking status: Never Smoker     Smokeless tobacco: Never Used     Alcohol use Yes       Comment: 2 drinks monthly      Drug use: No     Sexual activity: Yes     Partners: Male     Other Topics Concern     Parent/Sibling W/ Cabg, Mi Or Angioplasty Before 65f 55m? No      Service No     Blood Transfusions No     Caffeine Concern No     Occupational Exposure No     Hobby Hazards No     Sleep Concern No     Stress Concern No     Weight Concern No     Special Diet No     Back Care No     Exercise Yes     3 times per week     Bike Helmet No     Seat Belt Yes     Self-Exams Yes     Social History Narrative       Outpatient Encounter Prescriptions as of 11/28/2018   Medication Sig Dispense Refill     Ascorbic Acid (VITAMIN C PO)        estradiol (ESTRACE VAGINAL) 0.1 MG/GM cream 1 gm PV Q HS x 2 weeks, then 2-3 x's weekly thereafter (Patient not taking: Reported on 11/28/2018) 42.5 g 3     No facility-administered encounter medications on file as of 11/28/2018.        Review Of Systems:  Skin: As above  Eyes: negative  Ears/Nose/Throat: negative  Respiratory: No shortness of breath, dyspnea on exertion, cough, or hemoptysis  Cardiovascular: negative  Gastrointestinal: negative  Genitourinary: negative  Musculoskeletal: negative  Neurologic: negative  Psychiatric: negative  Hematologic/Lymphatic/Immunologic: negative  Endocrine: negative      Objective:     /80  Pulse 60  LMP 04/15/2010  SpO2 98%  Eyes: Conjunctivae/lids: Normal   ENT: Lips:  Normal  MSK: Normal  Cardiovascular: Peripheral edema none  Pulm: Breathing Normal  Neuro/Psych: Orientation: Normal; Mood/Affect: Normal, NAD, WDWN  Pt accompanied by: self  Following areas examined:  Scalp, face, eyelids, lips, neck, chest, abdomen, back, buttocks, and R&L upper and lower extremities.  [place] skin type:i   Findings:  Wd smooth red papule on right proximal ventral thigh 0.5cm  Tan WD smooth macules on face, neck, trunk, and extremities.  Well circumscribed macules with symmetric color distribution on trunk and extremities.  Red  smooth well-defined macules on trunk and extremities.    Assessment and Plan:  1)  Neoplasm of uncertain behavior right proximal ventral thigh 0.5cm  Rule out inflamed hemangioma  TANGENTIAL BIOPSY:  After consent, anesthesia with LEC and prep, tangential biopsy performed.  No complications and routine wound care.  May grow back and will get a scar. Based on lesion type may need to completely remove lesion. Patient will be notified in 7-10 days of results. Wound care directions given.  Base of lesion was dried with pressure and gauze and electrocautery for 2-3 s for 1 cycle. Warned risks of blistering, pain, pigment change, scarring, and incomplete resolution.  Advised patient to return if lesions do not completely resolve within 2-3 months.  Wound care sheet given.    2) Lentigines, multiple benign nevi, and cherry angiomas    Treatment of these lesions would be purely cosmetic and not medically neccessary.  Lesion may recur and/or may not completely resolve. May need additional treatment.  Different removal options including excision, cryotherapy, cautery and /or laser.      3) family history of skin cancer  Signs and Symptoms of non-melanoma skin cancer and ABCDEs of melanoma reviewed with patient. Patient encouraged to perform monthly self skin exams and educated on how to perform them. UV precautions reviewed with patient. Patient was asked about new or changing moles/lesions on body.   Wear a sunscreen with at least SPF 30 on your face, ears, neck and V of the chest daily. Wear sunscreen on other areas of the body if those areas are exposed to the sun throughout the day. Sunscreens can contain physical and/or chemical blockers. Physical blockers are less likely to clog pores, these include zinc oxide and titanium dioxide. Reapply every two hour and after swimming. Sunscreen examples include Neutrogena, CeraVe, Blue Lizard, Elta MD and many others.        Follow up in 2 years for FBE.       Again, thank you for  allowing me to participate in the care of your patient.        Sincerely,        Madelyn Angel PA-C

## 2018-11-28 NOTE — PATIENT INSTRUCTIONS
Consider botox and/or fillers.  Begin vitamin C and E serum with ferulic acid in the morning  Begin tretinoin(RX) or Differin (OTC): Apply a pea-sized amount to each affected area. Start every 3-4 nights working up to every night.   [employer] cosmeceuticals for products      Wear a sunscreen with at least SPF 30 on your face, ears, neck and V of the chest daily. Wear sunscreen on other areas of the body if those areas are exposed to the sun throughout the day. Sunscreens can contain physical and/or chemical blockers. Physical blockers are less likely to clog pores, these include zinc oxide and titanium dioxide. Reapply every two hour and after swimming. Sunscreen examples include Neutrogena, CeraVe, Blue Lizard, Elta MD and many others.            Proper skin care from [facility]:    -Eliminate harsh soaps as they strip the natural oils from the skin, often resulting in dry itchy skin ( i.e. Dial, Zest, [place] Spring)  -Use mild soaps such as Cetaphil or Dove Sensitive Skin in the shower. You do not need to use soap on arms, legs, and trunk every time you shower unless visibly soiled.   -Avoid hot or cold showers.  -After showering, lightly dry off and apply moisturizing within 2-3 minutes. This will help trap moisture in the skin.   -Aggressive use of a moisturizer at least 1-2 times a day to the entire body (including -Vanicream, Cetaphil, Aquaphor or Cerave) and moisturize hands after every washing.  -We recommend using moisturizers that come in a tub that needs to be scooped out, not a pump. This has more of an oil base. It will hold moisture in your skin much better than a water base moisturizer. The above recommended are non-pore clogging.      Wear a sunscreen with at least SPF 30 on your face, ears, neck and V of the chest daily. Wear sunscreen on other areas of the body if those areas are exposed to the sun throughout the day. Sunscreens can contain physical and/or chemical blockers. Physical blockers  are less likely to clog pores, these include zinc oxide and titanium dioxide. Reapply every two hour and after swimming. Sunscreen examples include Neutrogena, CeraVe, Blue Lizard, Elta MD and many others.    UV radiation  UVA radiation remains constant throughout the day and throughout the year. It is a longer wavelength than UVB and therefore penetrates deeper into the skin leading to immediate and delayed tanning, photoaging, and skin cancer. 70-80% of UVA and UVB radiation occurs between the hours of 10am-2pm.  UVB radiation  UVB radiation causes the most harmful effects and is more significant during the summer months. However, snow and ice can reflect UVB radiation leading to skin damage during the winter months as well. UVB radiation is responsible for tanning, burning, inflammation, delayed erythema (pinkness), pigmentation (brown spots), and skin cancer.   Just because you do not burn or are not developing a tan does not mean that you are not damaging your skin. A 15 minute drive to and from work for 30 years an lead to chronic sun damage of the skin. It is important to wear a broad spectrum (both UVA and UVB) sunscreen EVERY day with at least 30 SPF. Apply to face, ears, neck and v of the chest as this is where most of our sun exposure is. Reapply sunscreen every two hours if you plan on being outside.   Yeison Dewitt. Clinical Dermatology: A Color Guide to Diagnosis and Therapy. Elsevier, 2016.                  Wound Care Instructions     FOR SUPERFICIAL WOUNDS     [facility] [phone]    [facility] [phone]          AFTER 24 HOURS YOU SHOULD REMOVE THE BANDAGE AND BEGIN DAILY DRESSING CHANGES AS FOLLOWS:     1) Remove Dressing.     2) Clean and dry the area with tap water using a Q-tip or sterile gauze pad.     3) Apply Vaseline, Aquaphor, Polysporin ointment or Bacitracin ointment over entire wound.  Do NOT use Neosporin ointment.     4) Cover the wound with a  band-aid, or a sterile non-stick gauze pad and micropore paper tape      REPEAT THESE INSTRUCTIONS AT LEAST ONCE A DAY UNTIL THE WOUND HAS COMPLETELY HEALED.    It is an old wives tale that a wound heals better when it is exposed to air and allowed to dry out. The wound will heal faster with a better cosmetic result if it is kept moist with ointment and covered with a bandage.    **Do not let the wound dry out.**      Supplies Needed:      *Cotton tipped applicators (Q-tips)    *Polysporin Ointment or Bacitracin Ointment (NOT NEOSPORIN)    *Band-aids or non-stick gauze pads and micropore paper tape.      PATIENT INFORMATION:    During the healing process you will notice a number of changes. All wounds develop a small halo of redness surrounding the wound.  This means healing is occurring. Severe itching with extensive redness usually indicates sensitivity to the ointment or bandage tape used to dress the wound.  You should call our office if this develops.      Swelling  and/or discoloration around your surgical site is common, particularly when performed around the eye.    All wounds normally drain.  The larger the wound the more drainage there will be.  After 7-10 days, you will notice the wound beginning to shrink and new skin will begin to grow.  The wound is healed when you can see skin has formed over the entire area.  A healed wound has a healthy, shiny look to the surface and is red to dark pink in color to normalize.  Wounds may take approximately 4-6 weeks to heal.  Larger wounds may take 6-8 weeks.  After the wound is healed you may discontinue dressing changes.    You may experience a sensation of tightness as your wound heals. This is normal and will gradually subside.    Your healed wound may be sensitive to temperature changes. This sensitivity improves with time, but if you re having a lot of discomfort, try to avoid temperature extremes.    Patients frequently experience itching after their wound  appears to have healed because of the continue healing under the skin.  Plain Vaseline will help relieve the itching.        POSSIBLE COMPLICATIONS    BLEEDIN. Leave the bandage in place.  2. Use tightly rolled up gauze or a cloth to apply direct pressure over the bandage for 30  minutes.  3. Reapply pressure for an additional 30 minutes if necessary  4. Use additional gauze and tape to maintain pressure once the bleeding has stopped.

## 2018-11-28 NOTE — PROGRESS NOTES
HPI:  Anuradha Horn is a 48 year old female patient here today for spot on right thigh .  Patient states this has been present for years.  Patient reports the following symptoms: growing and rubs on pants .  Patient reports the following previous treatments: none.  Patient reports the following modifying factors: none.  Associated symptoms: none. States she has some signs of aging on her face and would like to discuss cosmetic treatments..  Patient has no other skin complaints today.  Remainder of the HPI, Meds, PMH, Allergies, FH, and SH was reviewed in chart.    Pertinent Hx:   Father had skin cancer and mother has precancers. No personal history of skin cancer.   Past Medical History:   Diagnosis Date     Infertility female      Normal delivery 05       Past Surgical History:   Procedure Laterality Date     D & C      following artifiscial insemination     SURGICAL HISTORY OF -   age 14    right hip slipped epiphysis        Family History   Problem Relation Age of Onset     Hypertension Father      Thyroid Disease Mother      Hypertension Mother      Breast Cancer Mother 75     Hypertension Paternal Grandmother      Asthma No family hx of      C.A.D. No family hx of      Diabetes No family hx of      Cerebrovascular Disease No family hx of      Cancer - colorectal No family hx of      Prostate Cancer No family hx of        Social History     Social History     Marital status:      Spouse name: N/A     Number of children: 2     Years of education: N/A     Occupational History     dentist [employer]     Social History Main Topics     Smoking status: Never Smoker     Smokeless tobacco: Never Used     Alcohol use Yes      Comment: 2 drinks monthly      Drug use: No     Sexual activity: Yes     Partners: Male     Other Topics Concern     Parent/Sibling W/ Cabg, Mi Or Angioplasty Before 65f 55m? No      Service No     Blood Transfusions No     Caffeine Concern No      Occupational Exposure No     Hobby Hazards No     Sleep Concern No     Stress Concern No     Weight Concern No     Special Diet No     Back Care No     Exercise Yes     3 times per week     Bike Helmet No     Seat Belt Yes     Self-Exams Yes     Social History Narrative       Outpatient Encounter Prescriptions as of 11/28/2018   Medication Sig Dispense Refill     Ascorbic Acid (VITAMIN C PO)        estradiol (ESTRACE VAGINAL) 0.1 MG/GM cream 1 gm PV Q HS x 2 weeks, then 2-3 x's weekly thereafter (Patient not taking: Reported on 11/28/2018) 42.5 g 3     No facility-administered encounter medications on file as of 11/28/2018.        Review Of Systems:  Skin: As above  Eyes: negative  Ears/Nose/Throat: negative  Respiratory: No shortness of breath, dyspnea on exertion, cough, or hemoptysis  Cardiovascular: negative  Gastrointestinal: negative  Genitourinary: negative  Musculoskeletal: negative  Neurologic: negative  Psychiatric: negative  Hematologic/Lymphatic/Immunologic: negative  Endocrine: negative      Objective:     /80  Pulse 60  LMP 04/15/2010  SpO2 98%  Eyes: Conjunctivae/lids: Normal   ENT: Lips:  Normal  MSK: Normal  Cardiovascular: Peripheral edema none  Pulm: Breathing Normal  Neuro/Psych: Orientation: Normal; Mood/Affect: Normal, NAD, WDWN  Pt accompanied by: self  Following areas examined:  Scalp, face, eyelids, lips, neck, chest, abdomen, back, buttocks, and R&L upper and lower extremities.  [place] skin type:i   Findings:  Wd smooth red papule on right proximal ventral thigh 0.5cm  Tan WD smooth macules on face, neck, trunk, and extremities.  Well circumscribed macules with symmetric color distribution on trunk and extremities.  Red smooth well-defined macules on trunk and extremities.    Assessment and Plan:  1)  Neoplasm of uncertain behavior right proximal ventral thigh 0.5cm  Rule out inflamed hemangioma  TANGENTIAL BIOPSY:  After consent, anesthesia with LEC and prep, tangential  biopsy performed.  No complications and routine wound care.  May grow back and will get a scar. Based on lesion type may need to completely remove lesion. Patient will be notified in 7-10 days of results. Wound care directions given.  Base of lesion was dried with pressure and gauze and electrocautery for 2-3 s for 1 cycle. Warned risks of blistering, pain, pigment change, scarring, and incomplete resolution.  Advised patient to return if lesions do not completely resolve within 2-3 months.  Wound care sheet given.    2) Lentigines, multiple benign nevi, and cherry angiomas    Treatment of these lesions would be purely cosmetic and not medically neccessary.  Lesion may recur and/or may not completely resolve. May need additional treatment.  Different removal options including excision, cryotherapy, cautery and /or laser.      3) family history of skin cancer  Signs and Symptoms of non-melanoma skin cancer and ABCDEs of melanoma reviewed with patient. Patient encouraged to perform monthly self skin exams and educated on how to perform them. UV precautions reviewed with patient. Patient was asked about new or changing moles/lesions on body.   Wear a sunscreen with at least SPF 30 on your face, ears, neck and V of the chest daily. Wear sunscreen on other areas of the body if those areas are exposed to the sun throughout the day. Sunscreens can contain physical and/or chemical blockers. Physical blockers are less likely to clog pores, these include zinc oxide and titanium dioxide. Reapply every two hour and after swimming. Sunscreen examples include Neutrogena, CeraVe, Blue Lizard, Elta MD and many others.        Follow up in 2 years for FBE.

## 2018-11-30 LAB — COPATH REPORT: NORMAL

## 2018-12-03 ENCOUNTER — TELEPHONE (OUTPATIENT)
Dept: DERMATOLOGY | Facility: CLINIC | Age: 48
End: 2018-12-03

## 2018-12-03 NOTE — TELEPHONE ENCOUNTER
Called and LM for patient to call back in regards to biopsy results.    Misael RN-BSN  [facility]  [phone]

## 2018-12-03 NOTE — TELEPHONE ENCOUNTER
Notes Recorded by Madelyn Angel PA-C on 11/30/2018 at 6:31 PM  .Skin, right proximal ventral thigh:   - Hemangioma - benign blood vessel growth  This is a benign lesion that does not need any additional treatment. Please let me know if you have any questions.

## 2018-12-03 NOTE — TELEPHONE ENCOUNTER
Called and spoke to patient. Educated patient on biopsy results- hemangioma (benign blood vessel growth). No further treatment, patient voiced understanding.    Misael RN-BSN  [facility]  [phone]

## 2019-01-20 ENCOUNTER — TRANSFERRED RECORDS (OUTPATIENT)
Dept: HEALTH INFORMATION MANAGEMENT | Facility: CLINIC | Age: 49
End: 2019-01-20

## 2019-01-29 ENCOUNTER — OFFICE VISIT (OUTPATIENT)
Dept: ENDOCRINOLOGY | Facility: CLINIC | Age: 49
End: 2019-01-29
Attending: FAMILY MEDICINE
Payer: COMMERCIAL

## 2019-01-29 VITALS
SYSTOLIC BLOOD PRESSURE: 123 MMHG | BODY MASS INDEX: 27.66 KG/M2 | OXYGEN SATURATION: 97 % | DIASTOLIC BLOOD PRESSURE: 84 MMHG | HEIGHT: 68 IN | WEIGHT: 182.5 LBS | HEART RATE: 68 BPM

## 2019-01-29 DIAGNOSIS — E04.1 THYROID NODULE: Primary | ICD-10-CM

## 2019-01-29 DIAGNOSIS — E28.39 PREMATURE OVARIAN FAILURE: ICD-10-CM

## 2019-01-29 PROCEDURE — 99204 OFFICE O/P NEW MOD 45 MIN: CPT | Performed by: INTERNAL MEDICINE

## 2019-01-29 ASSESSMENT — MIFFLIN-ST. JEOR: SCORE: 1510.56

## 2019-01-29 NOTE — LETTER
1/29/2019         RE: Anuradha Horn  [address]  [facility] [address]        Dear Colleague,    Thank you for referring your patient, Anuradha Horn, to the [facility]. Please see a copy of my visit note below.    Endocrinology Clinic Visit    Chief Complaint: Consult and Thyroid Disease (Nodules)     Information obtained from:Patient    Subjective:         HPI: Anuradha Horn is a 48 year old female with history of Thyroid nodules who is seen in consultation at Laine Loja*'s request for the same.     48-year-old female patient who was found to have a thyroid nodule on routine exam.  Follow-up ultrasound was done in November 2018 which showed multiple nodules described below.  Right lobe:  Nodule 1:  Nodule measurement: 0.8 x 0.3 x 0.7 cm  Nodule 2:  Nodule measurement: 3.5 x 1.8 x 3.6 cm  Isthmus:   Nodule 3:  Nodule measurement: 0.3 x 0.6 x 0.9 cm  Left Lobe:   Nodule 4:  Nodule measurement: 0.4 x 0.3 x 0.5 cm  Nodule 5:  Nodule measurement: 0.6 x 0.4 x 0.6 cm    The nodule on the right side measuring 3.5 x 1.8 x 3.6 cm was biopsied and 11/2018 and results as documented below were benign.    Thyroid, right nodule #2, ultrasound guided fine needle aspiration        Thyroid, right nodule #2, ultrasound guided fine needle aspiration:     Benign   Consistent with a benign nodule (includes adenomatoid nodule, colloid   nodule, etc.)    No family history of thyroid cancer or radiation treatment as a child.  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  Menopause was at the age of 41; because of early menopause she underwent a DEXA scan study 4 years ago.  The Z scores were above -2.    Lumbar spine Z-score in region of L1-L4 = -1.6    HIPS:  Mean total hip Z-score: -1.4   Left femoral neck Z-score = -1.1  Right femoral neck Z-score= Was not obtained.  Radius 33% Z-score = 0.3       No Known Allergies    Current Outpatient Medications    Medication Sig Dispense Refill     Ascorbic Acid (VITAMIN C PO)        Multiple Vitamins-Minerals (DAILY MULTI PO) Take 1 capsule by mouth daily       estradiol (ESTRACE VAGINAL) 0.1 MG/GM cream 1 gm PV Q HS x 2 weeks, then 2-3 x's weekly thereafter (Patient not taking: Reported on 11/28/2018) 42.5 g 3       Review of Systems     11 point review system (Constitutional, HENT, Eyes, Respiratory, Cardiovascular, Gastrointestinal, Genitourinary, Musculoskeletal,Neurological, Psychiatric/Behavioural, Endocrine) is negative or is as per HPI above    Past Medical History:   Diagnosis Date     Infertility female      Normal delivery 05       Past Surgical History:   Procedure Laterality Date     D & C      following artifiscial insemination     SURGICAL HISTORY OF -   age 14    right hip slipped epiphysis       Family History   Problem Relation Age of Onset     Hypertension Father      Thyroid Disease Mother      Hypertension Mother      Breast Cancer Mother 75     Hypertension Paternal Grandmother      Asthma No family hx of      C.A.D. No family hx of      Diabetes No family hx of      Cerebrovascular Disease No family hx of      Cancer - colorectal No family hx of      Prostate Cancer No family hx of        Social History     Socioeconomic History     Marital status:      Spouse name: None     Number of children: 2     Years of education: None     Highest education level: None   Social Needs     Financial resource strain: None     Food insecurity - worry: None     Food insecurity - inability: None     Transportation needs - medical: None     Transportation needs - non-medical: None   Occupational History     Occupation: dentist     Employer: [employer]   Tobacco Use     Smoking status: Never Smoker     Smokeless tobacco: Never Used   Substance and Sexual Activity     Alcohol use: Yes     Comment: 2 drinks monthly      Drug use: No     Sexual activity: Yes     Partners: Male   Other Topics  "Concern     Parent/sibling w/ CABG, MI or angioplasty before 65F 55M? No      Service No     Blood Transfusions No     Caffeine Concern No     Occupational Exposure No     Hobby Hazards No     Sleep Concern No     Stress Concern No     Weight Concern No     Special Diet No     Back Care No     Exercise Yes     Comment: 3 times per week     Bike Helmet No     Seat Belt Yes     Self-Exams Yes   Social History Narrative     None       Objective:   /84 (BP Location: Left arm, Patient Position: Sitting, Cuff Size: Adult Regular)   Pulse 68   Ht 1.726 m (5' 7.95\")   Wt 82.8 kg (182 lb 8 oz)   LMP 04/15/2010   SpO2 97%   BMI 27.79 kg/m     Constitutional: Pleasant no acute cardiopulmonary distress.   EYES: anicteric, normal extra-ocular movements, no lid lag or retraction   HEENT: Mouth/Throat: Mucous membrane is moist. Oropharynx is clear. No adenopathy.  Thyroid slightly enlarged on the right side; palpable nodule on the right side, mobile, firm and nontender.  Cardiovascular: RRR, S1, S2 normal.   Pulmonary/Chest: CTAB. No wheezing or rales   Abdominal: +BS. Non tender to palpation. No organomegaly present.  No stretch marks.  Neurological: Alert and oriented.  No tremor and reflexes are symmetrical bilaterally.  Muscle strength 5/5.   Extremities: No edema.  Lymphatic: no cervical lymphadenopathy.  Psychological: appropriate mood and affect     In House Labs:   No results found for: A1C    TSH   Date Value Ref Range Status   11/05/2018 1.39 0.40 - 4.00 mU/L Final   07/24/2014 1.03 0.4 - 5.0 mU/L Final       Creatinine   Date Value Ref Range Status   05/06/2010 0.75 0.52 - 1.04 mg/dL Final     Comment:     New IDMS-traceable calibration  beginning 5/1/08   ]    Assessment/Treatment Plan:      Multiple thyroid nodules: Dominant right-sided thyroid nodule measuring 3.5 cm; was biopsied previously and it was benign.  Other nodules are less than a centimeter and no indication for biopsy at this point in " time.  Recommended follow-up thyroid ultrasound in November 2019.  Discussed about in general thyroid nodules have, and they are and also indications for a biopsy when we do a biopsy whether the possible findings in detail and written information documented below was provided.  Follow-up ultrasound for November 2019 ordered.  Ordered also TSH and TPO to be checked before her next follow-up.    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  At that time she was just 2 years after menopause.  Would be reasonable to repeat DEXA scan with her next preventive health care.  DEXA scan was ordered.  Optimize calcium and vitamin D intake in the meantime.    Patient Instructions   INFORMATION FOR PATIENTS  THYROID NODULES AND   FINE NEEDLE ASPIRATION BIOPSY OF THE THYROID    The finding of a thyroid nodule is almost never an emergency.  Thyroid nodules are common, occurring in up to 50% of patients over the age of 50 years.      Innocent (not important enough to have been detected during life) thyroid cancer may be found in around 5% of people.   Likewise, about 5-15% of patients with demonstrated thyroid nodules will prove to have thyroid cancer if subjected to aggressive diagnostic measures.  As a rule, thyroid cancer has an excellent prognosis.  Therefore, in each patient with thyroid nodules, the decision has to be made about how important it is to identify and treat a cancer, if present.      When we find nodules on the thyroid we use ultrasound and either palpation or ultrasound guided biopsy to help determine which patients, from the large number with nodules on the thyroid, are in the group containing an important thyroid cancer.      Ultrasound Guided Fine Needle Aspiration Biopsy of the Thyroid uses the ultrasound eye to see the nodule and the needle during the biopsy procedure.  This procedure is performed in the radiology department.  The radiologist uses a small  needle to remove cells from the specific area of the thyroid. The cells are then analyzed under the microscope to determine whether or not they might be cancer.      The results of thyroid biopsy come in 4 categories    1. Benign or negative for cancer.  This is most common result, found in approximately 60-70% of biopsy specimens.  There remains a < 6 % chance that this result is wrong.    2. Positive for cancer.  This is found about 5 % of the time. There remains a  < 1% chance that cancer is not present when we make this diagnosis by biopsy. This result leads to a recommendation for surgery to make the final diagnosis of cancer.     3. Indeterminate, or the grey zone.  This is found in approximately 20-30% of patients.  This diagnosis identifies a higher risk group, often resulting in diagnostic surgery to establish the final diagnosis.  If all patients in this group go to surgery, only 10-30%, on average, prove to have cancer.  This group is subdivided into 3 subcategories: atypia of undetermined significance, follicular neoplasm and suspicious, with increasing risk.      4. Insufficient for diagnosis. This is found in 5-10% of biopsies. When this occurs we need to repeat the biopsy.      The greatest risk of thyroid biopsy is that the result is not clear (that it is in the indeterminate grey zone group), resulting in future thyroid surgery for what is likely to be benign thyroid disease.  There is a small risk of bleeding or infection.                  I will contact the patient with the test results.  Return to clinic in 12 months.    Test and/or medications prescribed today:  Orders Placed This Encounter   Procedures     US Thyroid     Dexa hip/pelvis/spine     Thyroid peroxidase antibody     TSH with free T4 reflex         David Barnard MD  Staff Endocrinologist    [phone]  Division of Endocrinology and Diabetes            Again, thank you for allowing me to participate in the care of your patient.         Sincerely,        David Barnard MD

## 2019-01-29 NOTE — PROGRESS NOTES
Endocrinology Clinic Visit    Chief Complaint: Consult and Thyroid Disease (Nodules)     Information obtained from:Patient    Subjective:         HPI: Anuradha Horn is a 48 year old female with history of Thyroid nodules who is seen in consultation at Laine Loja*'s request for the same.     48-year-old female patient who was found to have a thyroid nodule on routine exam.  Follow-up ultrasound was done in November 2018 which showed multiple nodules described below.  Right lobe:  Nodule 1:  Nodule measurement: 0.8 x 0.3 x 0.7 cm  Nodule 2:  Nodule measurement: 3.5 x 1.8 x 3.6 cm  Isthmus:   Nodule 3:  Nodule measurement: 0.3 x 0.6 x 0.9 cm  Left Lobe:   Nodule 4:  Nodule measurement: 0.4 x 0.3 x 0.5 cm  Nodule 5:  Nodule measurement: 0.6 x 0.4 x 0.6 cm    The nodule on the right side measuring 3.5 x 1.8 x 3.6 cm was biopsied and 11/2018 and results as documented below were benign.    Thyroid, right nodule #2, ultrasound guided fine needle aspiration        Thyroid, right nodule #2, ultrasound guided fine needle aspiration:     Benign   Consistent with a benign nodule (includes adenomatoid nodule, colloid   nodule, etc.)    No family history of thyroid cancer or radiation treatment as a child.  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  Menopause was at the age of 41; because of early menopause she underwent a DEXA scan study 4 years ago.  The Z scores were above -2.    Lumbar spine Z-score in region of L1-L4 = -1.6    HIPS:  Mean total hip Z-score: -1.4   Left femoral neck Z-score = -1.1  Right femoral neck Z-score= Was not obtained.  Radius 33% Z-score = 0.3       No Known Allergies    Current Outpatient Medications   Medication Sig Dispense Refill     Ascorbic Acid (VITAMIN C PO)        Multiple Vitamins-Minerals (DAILY MULTI PO) Take 1 capsule by mouth daily       estradiol (ESTRACE VAGINAL) 0.1 MG/GM cream 1 gm PV Q HS x 2 weeks, then 2-3 x's weekly thereafter (Patient not  taking: Reported on 11/28/2018) 42.5 g 3       Review of Systems     11 point review system (Constitutional, HENT, Eyes, Respiratory, Cardiovascular, Gastrointestinal, Genitourinary, Musculoskeletal,Neurological, Psychiatric/Behavioural, Endocrine) is negative or is as per HPI above    Past Medical History:   Diagnosis Date     Infertility female      Normal delivery 05       Past Surgical History:   Procedure Laterality Date     D & C      following artifiscial insemination     SURGICAL HISTORY OF -   age 14    right hip slipped epiphysis       Family History   Problem Relation Age of Onset     Hypertension Father      Thyroid Disease Mother      Hypertension Mother      Breast Cancer Mother 75     Hypertension Paternal Grandmother      Asthma No family hx of      C.A.D. No family hx of      Diabetes No family hx of      Cerebrovascular Disease No family hx of      Cancer - colorectal No family hx of      Prostate Cancer No family hx of        Social History     Socioeconomic History     Marital status:      Spouse name: None     Number of children: 2     Years of education: None     Highest education level: None   Social Needs     Financial resource strain: None     Food insecurity - worry: None     Food insecurity - inability: None     Transportation needs - medical: None     Transportation needs - non-medical: None   Occupational History     Occupation: dentist     Employer: [employer]   Tobacco Use     Smoking status: Never Smoker     Smokeless tobacco: Never Used   Substance and Sexual Activity     Alcohol use: Yes     Comment: 2 drinks monthly      Drug use: No     Sexual activity: Yes     Partners: Male   Other Topics Concern     Parent/sibling w/ CABG, MI or angioplasty before 65F 55M? No      Service No     Blood Transfusions No     Caffeine Concern No     Occupational Exposure No     Hobby Hazards No     Sleep Concern No     Stress Concern No     Weight Concern No      "Special Diet No     Back Care No     Exercise Yes     Comment: 3 times per week     Bike Helmet No     Seat Belt Yes     Self-Exams Yes   Social History Narrative     None       Objective:   /84 (BP Location: Left arm, Patient Position: Sitting, Cuff Size: Adult Regular)   Pulse 68   Ht 1.726 m (5' 7.95\")   Wt 82.8 kg (182 lb 8 oz)   LMP 04/15/2010   SpO2 97%   BMI 27.79 kg/m    Constitutional: Pleasant no acute cardiopulmonary distress.   EYES: anicteric, normal extra-ocular movements, no lid lag or retraction   HEENT: Mouth/Throat: Mucous membrane is moist. Oropharynx is clear. No adenopathy.  Thyroid slightly enlarged on the right side; palpable nodule on the right side, mobile, firm and nontender.  Cardiovascular: RRR, S1, S2 normal.   Pulmonary/Chest: CTAB. No wheezing or rales   Abdominal: +BS. Non tender to palpation. No organomegaly present.  No stretch marks.  Neurological: Alert and oriented.  No tremor and reflexes are symmetrical bilaterally.  Muscle strength 5/5.   Extremities: No edema.  Lymphatic: no cervical lymphadenopathy.  Psychological: appropriate mood and affect     In House Labs:   No results found for: A1C    TSH   Date Value Ref Range Status   11/05/2018 1.39 0.40 - 4.00 mU/L Final   07/24/2014 1.03 0.4 - 5.0 mU/L Final       Creatinine   Date Value Ref Range Status   05/06/2010 0.75 0.52 - 1.04 mg/dL Final     Comment:     New IDMS-traceable calibration  beginning 5/1/08   ]    Assessment/Treatment Plan:      Multiple thyroid nodules: Dominant right-sided thyroid nodule measuring 3.5 cm; was biopsied previously and it was benign.  Other nodules are less than a centimeter and no indication for biopsy at this point in time.  Recommended follow-up thyroid ultrasound in November 2019.  Discussed about in general thyroid nodules have, and they are and also indications for a biopsy when we do a biopsy whether the possible findings in detail and written information documented below was " provided.  Follow-up ultrasound for November 2019 ordered.  Ordered also TSH and TPO to be checked before her next follow-up.    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  At that time she was just 2 years after menopause.  Would be reasonable to repeat DEXA scan with her next preventive health care.  DEXA scan was ordered.  Optimize calcium and vitamin D intake in the meantime.    Patient Instructions   INFORMATION FOR PATIENTS  THYROID NODULES AND   FINE NEEDLE ASPIRATION BIOPSY OF THE THYROID    The finding of a thyroid nodule is almost never an emergency.  Thyroid nodules are common, occurring in up to 50% of patients over the age of 50 years.      Innocent (not important enough to have been detected during life) thyroid cancer may be found in around 5% of people.   Likewise, about 5-15% of patients with demonstrated thyroid nodules will prove to have thyroid cancer if subjected to aggressive diagnostic measures.  As a rule, thyroid cancer has an excellent prognosis.  Therefore, in each patient with thyroid nodules, the decision has to be made about how important it is to identify and treat a cancer, if present.      When we find nodules on the thyroid we use ultrasound and either palpation or ultrasound guided biopsy to help determine which patients, from the large number with nodules on the thyroid, are in the group containing an important thyroid cancer.      Ultrasound Guided Fine Needle Aspiration Biopsy of the Thyroid uses the ultrasound eye to see the nodule and the needle during the biopsy procedure.  This procedure is performed in the radiology department.  The radiologist uses a small needle to remove cells from the specific area of the thyroid. The cells are then analyzed under the microscope to determine whether or not they might be cancer.      The results of thyroid biopsy come in 4 categories    1. Benign or negative for cancer.  This is most  common result, found in approximately 60-70% of biopsy specimens.  There remains a < 6 % chance that this result is wrong.    2. Positive for cancer.  This is found about 5 % of the time. There remains a  < 1% chance that cancer is not present when we make this diagnosis by biopsy. This result leads to a recommendation for surgery to make the final diagnosis of cancer.     3. Indeterminate, or the grey zone.  This is found in approximately 20-30% of patients.  This diagnosis identifies a higher risk group, often resulting in diagnostic surgery to establish the final diagnosis.  If all patients in this group go to surgery, only 10-30%, on average, prove to have cancer.  This group is subdivided into 3 subcategories: atypia of undetermined significance, follicular neoplasm and suspicious, with increasing risk.      4. Insufficient for diagnosis. This is found in 5-10% of biopsies. When this occurs we need to repeat the biopsy.      The greatest risk of thyroid biopsy is that the result is not clear (that it is in the indeterminate grey zone group), resulting in future thyroid surgery for what is likely to be benign thyroid disease.  There is a small risk of bleeding or infection.                  I will contact the patient with the test results.  Return to clinic in 12 months.    Test and/or medications prescribed today:  Orders Placed This Encounter   Procedures     US Thyroid     Dexa hip/pelvis/spine     Thyroid peroxidase antibody     TSH with free T4 reflex         David Barnard MD  Staff Endocrinologist    [phone]  Division of Endocrinology and Diabetes

## 2019-01-29 NOTE — PATIENT INSTRUCTIONS
INFORMATION FOR PATIENTS  THYROID NODULES AND   FINE NEEDLE ASPIRATION BIOPSY OF THE THYROID    The finding of a thyroid nodule is almost never an emergency.  Thyroid nodules are common, occurring in up to 50% of patients over the age of 50 years.      Innocent (not important enough to have been detected during life) thyroid cancer may be found in around 5% of people.   Likewise, about 5-15% of patients with demonstrated thyroid nodules will prove to have thyroid cancer if subjected to aggressive diagnostic measures.  As a rule, thyroid cancer has an excellent prognosis.  Therefore, in each patient with thyroid nodules, the decision has to be made about how important it is to identify and treat a cancer, if present.      When we find nodules on the thyroid we use ultrasound and either palpation or ultrasound guided biopsy to help determine which patients, from the large number with nodules on the thyroid, are in the group containing an important thyroid cancer.      Ultrasound Guided Fine Needle Aspiration Biopsy of the Thyroid uses the ultrasound eye to see the nodule and the needle during the biopsy procedure.  This procedure is performed in the radiology department.  The radiologist uses a small needle to remove cells from the specific area of the thyroid. The cells are then analyzed under the microscope to determine whether or not they might be cancer.      The results of thyroid biopsy come in 4 categories    1. Benign or negative for cancer.  This is most common result, found in approximately 60-70% of biopsy specimens.  There remains a < 6 % chance that this result is wrong.    2. Positive for cancer.  This is found about 5 % of the time. There remains a  < 1% chance that cancer is not present when we make this diagnosis by biopsy. This result leads to a recommendation for surgery to make the final diagnosis of cancer.     3. Indeterminate, or the grey zone.  This is found in approximately 20-30% of patients.   This diagnosis identifies a higher risk group, often resulting in diagnostic surgery to establish the final diagnosis.  If all patients in this group go to surgery, only 10-30%, on average, prove to have cancer.  This group is subdivided into 3 subcategories: atypia of undetermined significance, follicular neoplasm and suspicious, with increasing risk.      4. Insufficient for diagnosis. This is found in 5-10% of biopsies. When this occurs we need to repeat the biopsy.      The greatest risk of thyroid biopsy is that the result is not clear (that it is in the indeterminate grey zone group), resulting in future thyroid surgery for what is likely to be benign thyroid disease.  There is a small risk of bleeding or infection.

## 2019-01-29 NOTE — NURSING NOTE
"Anuradha Horn's goals for this visit include:   Chief Complaint   Patient presents with     Consult     Thyroid Disease     Nodules     She requests these members of her care team be copied on today's visit information: Yes    PCP: Laine Canas    Referring Provider:  Laine Canas MD  [address] [place] [address]    /84 (BP Location: Left arm, Patient Position: Sitting, Cuff Size: Adult Regular)   Pulse 68   Ht 1.726 m (5' 7.95\")   Wt 82.8 kg (182 lb 8 oz)   LMP 04/15/2010   SpO2 97%   BMI 27.79 kg/m      Do you need any medication refills at today's visit? No    "

## 2019-03-18 ENCOUNTER — MYC MEDICAL ADVICE (OUTPATIENT)
Dept: FAMILY MEDICINE | Facility: CLINIC | Age: 49
End: 2019-03-18

## 2019-03-18 DIAGNOSIS — Z87.898 H/O MOTION SICKNESS: Primary | ICD-10-CM

## 2019-03-18 RX ORDER — SCOLOPAMINE TRANSDERMAL SYSTEM 1 MG/1
1 PATCH, EXTENDED RELEASE TRANSDERMAL
Qty: 4 PATCH | Refills: 0 | Status: SHIPPED | OUTPATIENT
Start: 2019-03-18 | End: 2020-01-29

## 2019-03-18 NOTE — TELEPHONE ENCOUNTER
Reason for Call:  Medication or medication refill:    Do you use a [facility]? NO     Name of the pharmacy and phone number for the current request: [facility] [address]    Name of the medication requested: Scop patch for motion sickness    Other request: med request    Can we leave a detailed message on this number? YES    Phone number patient can be reached at: Cell number on file:    Telephone Information:   Mobile [phone]       Best Time: anytime    Call taken on 3/18/2019 at 3:00 PM by Edwin Hernandez

## 2019-03-18 NOTE — TELEPHONE ENCOUNTER
Requested Prescriptions   Pending Prescriptions Disp Refills     scopolamine (TRANSDERM) 1 MG/3DAYS 72 hr patch       Sig: Place 1 patch onto the skin every 72 hours    There is no refill protocol information for this order        Routing refill request to provider for review/approval because:  Drug not on the [facility] refill protocol   Drug not active on patient's medication list-does not look like this has been prescribed for patient in the past.          Mari Lopez RN, BSN

## 2019-11-05 ENCOUNTER — HEALTH MAINTENANCE LETTER (OUTPATIENT)
Age: 49
End: 2019-11-05

## 2020-01-07 DIAGNOSIS — Z12.39 SCREENING FOR BREAST CANCER: ICD-10-CM

## 2020-01-29 ENCOUNTER — OFFICE VISIT (OUTPATIENT)
Dept: FAMILY MEDICINE | Facility: CLINIC | Age: 50
End: 2020-01-29
Payer: COMMERCIAL

## 2020-01-29 VITALS
TEMPERATURE: 97.8 F | BODY MASS INDEX: 28.66 KG/M2 | OXYGEN SATURATION: 98 % | WEIGHT: 189.1 LBS | HEART RATE: 63 BPM | SYSTOLIC BLOOD PRESSURE: 122 MMHG | RESPIRATION RATE: 16 BRPM | DIASTOLIC BLOOD PRESSURE: 80 MMHG | HEIGHT: 68 IN

## 2020-01-29 DIAGNOSIS — N95.1 MENOPAUSAL SYNDROME (HOT FLASHES): ICD-10-CM

## 2020-01-29 DIAGNOSIS — Z00.00 ROUTINE GENERAL MEDICAL EXAMINATION AT A HEALTH CARE FACILITY: Primary | ICD-10-CM

## 2020-01-29 DIAGNOSIS — E28.39 ESTROGEN DEFICIENCY: ICD-10-CM

## 2020-01-29 DIAGNOSIS — N83.8 DIMINISHED OVARIAN RESERVE: ICD-10-CM

## 2020-01-29 DIAGNOSIS — N95.2 ATROPHIC VAGINITIS: ICD-10-CM

## 2020-01-29 DIAGNOSIS — G43.109 MIGRAINE WITH AURA AND WITHOUT STATUS MIGRAINOSUS, NOT INTRACTABLE: ICD-10-CM

## 2020-01-29 LAB
BASOPHILS # BLD AUTO: 0 10E9/L (ref 0–0.2)
BASOPHILS NFR BLD AUTO: 0.5 %
DIFFERENTIAL METHOD BLD: NORMAL
EOSINOPHIL # BLD AUTO: 0.2 10E9/L (ref 0–0.7)
EOSINOPHIL NFR BLD AUTO: 2.5 %
ERYTHROCYTE [DISTWIDTH] IN BLOOD BY AUTOMATED COUNT: 12.6 % (ref 10–15)
HCT VFR BLD AUTO: 43.4 % (ref 35–47)
HGB BLD-MCNC: 13.9 G/DL (ref 11.7–15.7)
LYMPHOCYTES # BLD AUTO: 2.9 10E9/L (ref 0.8–5.3)
LYMPHOCYTES NFR BLD AUTO: 37.8 %
MCH RBC QN AUTO: 29.8 PG (ref 26.5–33)
MCHC RBC AUTO-ENTMCNC: 32 G/DL (ref 31.5–36.5)
MCV RBC AUTO: 93 FL (ref 78–100)
MONOCYTES # BLD AUTO: 0.6 10E9/L (ref 0–1.3)
MONOCYTES NFR BLD AUTO: 7.5 %
NEUTROPHILS # BLD AUTO: 4 10E9/L (ref 1.6–8.3)
NEUTROPHILS NFR BLD AUTO: 51.7 %
PLATELET # BLD AUTO: 271 10E9/L (ref 150–450)
RBC # BLD AUTO: 4.67 10E12/L (ref 3.8–5.2)
WBC # BLD AUTO: 7.7 10E9/L (ref 4–11)

## 2020-01-29 PROCEDURE — 90471 IMMUNIZATION ADMIN: CPT | Performed by: FAMILY MEDICINE

## 2020-01-29 PROCEDURE — 90714 TD VACC NO PRESV 7 YRS+ IM: CPT | Performed by: FAMILY MEDICINE

## 2020-01-29 PROCEDURE — 85025 COMPLETE CBC W/AUTO DIFF WBC: CPT | Performed by: FAMILY MEDICINE

## 2020-01-29 PROCEDURE — 99396 PREV VISIT EST AGE 40-64: CPT | Mod: 25 | Performed by: FAMILY MEDICINE

## 2020-01-29 PROCEDURE — 36415 COLL VENOUS BLD VENIPUNCTURE: CPT | Performed by: FAMILY MEDICINE

## 2020-01-29 RX ORDER — ESTRADIOL 0.1 MG/G
CREAM VAGINAL
Qty: 42.5 G | Refills: 3 | Status: SHIPPED | OUTPATIENT
Start: 2020-01-29 | End: 2021-06-21

## 2020-01-29 ASSESSMENT — MIFFLIN-ST. JEOR: SCORE: 1536.25

## 2020-01-29 ASSESSMENT — PAIN SCALES - GENERAL: PAINLEVEL: NO PAIN (0)

## 2020-01-29 NOTE — PROGRESS NOTES
SUBJECTIVE:   CC: Anuradha Horn is an 49 year old woman who presents for preventive health visit.     Healthy Habits:    Do you get at least three servings of calcium containing foods daily (dairy, green leafy vegetables, etc.)? yes    Amount of exercise or daily activities, outside of work: 3 day(s) per week    Problems taking medications regularly No    Medication side effects: No    Have you had an eye exam in the past two years? yes    Do you see a dentist twice per year? yes    Do you have sleep apnea, excessive snoring or daytime drowsiness?no    -Pt already got her influenza vaccine  -Intermittently takes her daily multivitamin   -Had a 3D mammogram on 01/07/2020 that was normal.   -Last DEXA scan was on 08/07/2014, scores were in the expected ranges.     Thyroid nodule  -Thyroid is stable, pt follows with endocrinology; last appointment was on 01/29/2019. Recommended follow-up thyroid US in Nov 2019, pt needs to schedule this. About once a month she will notice her voice is hoarse, but this doesn't last. Unchanged from last evaluation of thyroid    -Patient doesn't like that her weight is trending upwards. She is not exercising. Feels that she tries to eat fruits and vegetables and avoid fried foods.   Wt Readings from Last 4 Encounters:   01/29/20 85.8 kg (189 lb 1.6 oz)   01/29/19 82.8 kg (182 lb 8 oz)   11/05/18 83.2 kg (183 lb 6.4 oz)   03/30/17 79.1 kg (174 lb 6.4 oz)         -Pt tried using vaginal estrogen cream which seemed to help her dryness a bit, but it doesn't provide significant improvement. However, pt notes that she was only using the cream once per month. Since starting this she will wake up at night feeling very hot, but not diaphoretic. She didn't have hot flashes when she went trough menopause so she is unsure if she is experiencing them or not. LMP was seven years ago (pt was 42 years old)  -Denies vaginal or bladder concerns     Migraines  -Pt experiences 2 per month at  her baseline, typically wakes up with them. They have not worsened or changed in frequency/type/intensity.  She takes Excerdin that helps them improve and she can participate in her daily activities.         Today's PHQ-2 Score:   PHQ-2 ( 1999 Pfizer) 1/29/2020 11/5/2018   Q1: Little interest or pleasure in doing things 0 0   Q2: Feeling down, depressed or hopeless 0 0   PHQ-2 Score 0 0       Abuse: Current or Past(Physical, Sexual or Emotional)- No  Do you feel safe in your environment? Yes        Social History     Tobacco Use     Smoking status: Never Smoker     Smokeless tobacco: Never Used   Substance Use Topics     Alcohol use: Yes     Comment: 2 drinks monthly      If you drink alcohol do you typically have >3 drinks per day or >7 drinks per week? No                     Reviewed orders with patient.  Reviewed health maintenance and updated orders accordingly - Yes  Patient Active Problem List   Diagnosis     CARDIOVASCULAR SCREENING; LDL GOAL LESS THAN 160     Migraine with aura and without status migrainosus, not intractable     Calculus of gallbladder without cholecystitis without obstruction     Atrophic vaginitis     Thyroid nodule     Diminished ovarian reserve     Past Surgical History:   Procedure Laterality Date     D & C      following artifiscial insemination     SURGICAL HISTORY OF -   age 14    right hip slipped epiphysis       Social History     Tobacco Use     Smoking status: Never Smoker     Smokeless tobacco: Never Used   Substance Use Topics     Alcohol use: Yes     Comment: 2 drinks monthly      Family History   Problem Relation Age of Onset     Hypertension Father      Thyroid Disease Mother      Hypertension Mother      Breast Cancer Mother 75     Hypertension Paternal Grandmother      Asthma No family hx of      C.A.D. No family hx of      Diabetes No family hx of      Cerebrovascular Disease No family hx of      Cancer - colorectal No family hx of      Prostate Cancer No family hx of      "       Mammogram Screening: Patient under age 50, mutual decision reflected in health maintenance.      Pertinent mammograms are reviewed under the imaging tab.  History of abnormal Pap smear: NO - age 30-65 PAP every 5 years with negative HPV co-testing recommended  PAP / HPV Latest Ref Rng & Units 3/30/2017   PAP - NIL   HPV 16 DNA NEG Negative   HPV 18 DNA NEG Negative   OTHER HR HPV NEG Negative     Reviewed and updated as needed this visit by clinical staff  Tobacco  Allergies  Meds         Reviewed and updated as needed this visit by Provider            ROS:   ROS: 10 point ROS neg other than the symptoms noted above in the HPI.    This document serves as a record of the services and decisions personally performed by IGOR WILKINS. It was created on his/her behalf by Cindy Villalba, a trained medical scribe. The creation of this document is based on the provider's statements to the medical scribe. Cindy Villalba, January 29, 2020 4:24 PM    OBJECTIVE:   /80 (BP Location: Right arm, Patient Position: Sitting, Cuff Size: Adult Regular)   Pulse 63   Temp 97.8  F (36.6  C) (Oral)   Resp 16   Ht 1.727 m (5' 8\")   Wt 85.8 kg (189 lb 1.6 oz)   LMP 04/15/2010   SpO2 98%   BMI 28.75 kg/m    EXAM:  GENERAL APPEARANCE: healthy, alert and no distress  EYES: Eyes grossly normal to inspection, PERRL and conjunctivae and sclerae normal  HENT: ear canals and TM's normal, nose and mouth without ulcers or lesions, oropharynx clear and oral mucous membranes moist  NECK: no adenopathy, no asymmetry, masses, or scars fullness of thyroid on right side consistent with her thyroid nodule  RESP: lungs clear to auscultation - no rales, rhonchi or wheezes  BREAST: normal without masses, tenderness or nipple discharge and no palpable axillary masses or adenopathy  CV: regular rate and rhythm, normal S1 S2, no S3 or S4, no murmur, click or rub, no peripheral edema and peripheral pulses strong  ABDOMEN: soft, " "nontender, no hepatosplenomegaly, no masses and bowel sounds normal   (female): normal female external genitalia, normal urethral meatus, vaginal mucosal atrophy noted, normal cervix, adnexae, and uterus without masses or abnormal discharge  MS: no musculoskeletal defects are noted and gait is age appropriate without ataxia  SKIN: no suspicious lesions or rashes  NEURO: Normal strength and tone, sensory exam grossly normal, mentation intact and speech normal  PSYCH: mentation appears normal and affect normal/bright    Diagnostic Test Results:  Labs reviewed in Epic    ASSESSMENT/PLAN:   1. Routine general medical examination at a health care facility  Reviewed health maintenance and immunizations.   Offered pt vaginal infection screening and she declined, no concerns.  Encouraged to start being more active to help her weight stop trending upwards.     2. Diminished ovarian reserve  3. Estrogen deficiency  - DX Hip/Pelvis/Spine; Future    4. Menopausal syndrome (hot flashes)  Will get cbc and she will get thyroid labs when she sees endocrine  - CBC with platelets differential    5. Migraine with aura and without status migrainosus, not intractable  At baseline, continue Excedrin prn.     6. Atrophic vaginitis  Stable. Continue current medication   - estradiol (ESTRACE VAGINAL) 0.1 MG/GM vaginal cream; 1 gm PV Q HS x 2 weeks, then 2-3 x's weekly thereafter  Dispense: 42.5 g; Refill: 3         COUNSELING:   Reviewed preventive health counseling, as reflected in patient instructions       Regular exercise       Healthy diet/nutrition       Vision screening       Hearing screening       Immunizations       Alcohol Use       Osteoporosis Prevention/Bone Health    Estimated body mass index is 28.75 kg/m  as calculated from the following:    Height as of this encounter: 1.727 m (5' 8\").    Weight as of this encounter: 85.8 kg (189 lb 1.6 oz).    Weight management plan: Discussed healthy diet and exercise guidelines     " reports that she has never smoked. She has never used smokeless tobacco.    Patient Instructions   Please call [facility] at [phone] to schedule a DEXA bone scan    Increase frequency and intensity of exercise.    Preventive Health Recommendations  Female Ages 40 to 49    Yearly exam:     See your health care provider every year in order to  1. Review health changes.   2. Discuss preventive care.    3. Review your medicines if your doctor prescribed any.      Get a Pap test every three years (unless you have an abnormal result and your provider advises testing more often).      If you get Pap tests with HPV test, you only need to test every 5 years, unless you have an abnormal result. You do not need a Pap test if your uterus was removed (hysterectomy) and you have not had cancer.      You should be tested each year for STDs (sexually transmitted diseases), if you're at risk.     Ask your doctor if you should have a mammogram.      Have a colonoscopy (test for colon cancer) if someone in your family has had colon cancer or polyps before age 50.       Have a cholesterol test every 5 years.       Have a diabetes test (fasting glucose) after age 45. If you are at risk for diabetes, you should have this test every 3 years.    Shots: Get a flu shot each year. Get a tetanus shot every 10 years.     Nutrition:     Eat at least 5 servings of fruits and vegetables each day.    Eat whole-grain bread, whole-wheat pasta and brown rice instead of white grains and rice.    Get adequate Calcium and Vitamin D.      Lifestyle    Exercise at least 150 minutes a week (an average of 30 minutes a day, 5 days a week). This will help you control your weight and prevent disease.    Limit alcohol to one drink per day.    No smoking.     Wear sunscreen to prevent skin cancer.    See your dentist every six months for an exam and cleaning.        Counseling Resources:  ATP IV Guidelines  Pooled Cohorts Equation  Calculator  Breast Cancer Risk Calculator  FRAX Risk Assessment  ICSI Preventive Guidelines  Dietary Guidelines for Americans, 2010  USDA's MyPlate  ASA Prophylaxis  Lung CA Screening    The information in this document, created by the medical scribe for me, accurately reflects the services I personally performed and the decisions made by me. I have reviewed and approved this document for accuracy.   Laine Canas MD  [facility]

## 2020-01-29 NOTE — PATIENT INSTRUCTIONS
Please call [facility] at [phone] to schedule a DEXA bone scan    Increase frequency and intensity of exercise.    Preventive Health Recommendations  Female Ages 40 to 49    Yearly exam:     See your health care provider every year in order to  1. Review health changes.   2. Discuss preventive care.    3. Review your medicines if your doctor prescribed any.      Get a Pap test every three years (unless you have an abnormal result and your provider advises testing more often).      If you get Pap tests with HPV test, you only need to test every 5 years, unless you have an abnormal result. You do not need a Pap test if your uterus was removed (hysterectomy) and you have not had cancer.      You should be tested each year for STDs (sexually transmitted diseases), if you're at risk.     Ask your doctor if you should have a mammogram.      Have a colonoscopy (test for colon cancer) if someone in your family has had colon cancer or polyps before age 50.       Have a cholesterol test every 5 years.       Have a diabetes test (fasting glucose) after age 45. If you are at risk for diabetes, you should have this test every 3 years.    Shots: Get a flu shot each year. Get a tetanus shot every 10 years.     Nutrition:     Eat at least 5 servings of fruits and vegetables each day.    Eat whole-grain bread, whole-wheat pasta and brown rice instead of white grains and rice.    Get adequate Calcium and Vitamin D.      Lifestyle    Exercise at least 150 minutes a week (an average of 30 minutes a day, 5 days a week). This will help you control your weight and prevent disease.    Limit alcohol to one drink per day.    No smoking.     Wear sunscreen to prevent skin cancer.    See your dentist every six months for an exam and cleaning.

## 2020-02-19 ENCOUNTER — OFFICE VISIT (OUTPATIENT)
Dept: DERMATOLOGY | Facility: CLINIC | Age: 50
End: 2020-02-19
Payer: COMMERCIAL

## 2020-02-19 VITALS — HEART RATE: 62 BPM | DIASTOLIC BLOOD PRESSURE: 79 MMHG | OXYGEN SATURATION: 99 % | SYSTOLIC BLOOD PRESSURE: 121 MMHG

## 2020-02-19 DIAGNOSIS — D48.5 NEOPLASM OF UNCERTAIN BEHAVIOR OF SKIN: ICD-10-CM

## 2020-02-19 DIAGNOSIS — D18.01 HEMANGIOMA OF SKIN: ICD-10-CM

## 2020-02-19 DIAGNOSIS — D22.9 MULTIPLE BENIGN NEVI: ICD-10-CM

## 2020-02-19 DIAGNOSIS — D18.01 CHERRY ANGIOMA: Primary | ICD-10-CM

## 2020-02-19 DIAGNOSIS — L81.4 LENTIGINES: ICD-10-CM

## 2020-02-19 PROCEDURE — 11102 TANGNTL BX SKIN SINGLE LES: CPT | Performed by: PHYSICIAN ASSISTANT

## 2020-02-19 PROCEDURE — 99214 OFFICE O/P EST MOD 30 MIN: CPT | Mod: 25 | Performed by: PHYSICIAN ASSISTANT

## 2020-02-19 PROCEDURE — 11103 TANGNTL BX SKIN EA SEP/ADDL: CPT | Performed by: PHYSICIAN ASSISTANT

## 2020-02-19 PROCEDURE — 88305 TISSUE EXAM BY PATHOLOGIST: CPT | Mod: TC | Performed by: PHYSICIAN ASSISTANT

## 2020-02-19 NOTE — PROGRESS NOTES
HPI:  Anuradha Horn is a 49 year old female patient here today for spot under left brerast .  Patient states this has been present for a while.  Patient reports the following symptoms: bothersome, irritating, has bled .  Patient reports the following previous treatments: none.  Patient reports the following modifying factors: none.  Associated symptoms: none.  Patient has no other skin complaints today.  Remainder of the HPI, Meds, PMH, Allergies, FH, and SH was reviewed in chart.    Pertinent Hx:   Father had skin cancer and mother has precancers. No personal history of skin cancer.   Past Medical History:   Diagnosis Date     Infertility female      Normal delivery 05       Past Surgical History:   Procedure Laterality Date     D & C      following artifiscial insemination     SURGICAL HISTORY OF -   age 14    right hip slipped epiphysis        Family History   Problem Relation Age of Onset     Hypertension Father      Thyroid Disease Mother      Hypertension Mother      Breast Cancer Mother 75     Hypertension Paternal Grandmother      Asthma No family hx of      C.A.D. No family hx of      Diabetes No family hx of      Cerebrovascular Disease No family hx of      Cancer - colorectal No family hx of      Prostate Cancer No family hx of        Social History     Socioeconomic History     Marital status:      Spouse name: Not on file     Number of children: 2     Years of education: Not on file     Highest education level: Not on file   Occupational History     Occupation: dentist     Employer: [employer]   Social Needs     Financial resource strain: Not on file     Food insecurity:     Worry: Not on file     Inability: Not on file     Transportation needs:     Medical: Not on file     Non-medical: Not on file   Tobacco Use     Smoking status: Never Smoker     Smokeless tobacco: Never Used   Substance and Sexual Activity     Alcohol use: Yes     Comment: 2 drinks monthly      Drug  use: No     Sexual activity: Yes     Partners: Male   Lifestyle     Physical activity:     Days per week: Not on file     Minutes per session: Not on file     Stress: Not on file   Relationships     Social connections:     Talks on phone: Not on file     Gets together: Not on file     Attends Protestant service: Not on file     Active member of club or organization: Not on file     Attends meetings of clubs or organizations: Not on file     Relationship status: Not on file     Intimate partner violence:     Fear of current or ex partner: Not on file     Emotionally abused: Not on file     Physically abused: Not on file     Forced sexual activity: Not on file   Other Topics Concern     Parent/sibling w/ CABG, MI or angioplasty before 65F 55M? No      Service No     Blood Transfusions No     Caffeine Concern No     Occupational Exposure No     Hobby Hazards No     Sleep Concern No     Stress Concern No     Weight Concern No     Special Diet No     Back Care No     Exercise Yes     Comment: 3 times per week     Bike Helmet No     Seat Belt Yes     Self-Exams Yes   Social History Narrative     Not on file       Outpatient Encounter Medications as of 2/19/2020   Medication Sig Dispense Refill     Ascorbic Acid (VITAMIN C PO)        estradiol (ESTRACE VAGINAL) 0.1 MG/GM vaginal cream 1 gm PV Q HS x 2 weeks, then 2-3 x's weekly thereafter 42.5 g 3     Multiple Vitamins-Minerals (DAILY MULTI PO) Take 1 capsule by mouth daily       No facility-administered encounter medications on file as of 2/19/2020.        Review Of Systems:  Skin: spot under left breast  Eyes: negative  Ears/Nose/Throat: negative  Respiratory: No shortness of breath, dyspnea on exertion, cough, or hemoptysis  Cardiovascular: negative  Gastrointestinal: negative  Genitourinary: negative  Musculoskeletal: negative  Neurologic: negative  Psychiatric: negative  Hematologic/Lymphatic/Immunologic: negative  Endocrine: negative      Objective:     BP  121/79   Pulse 62   LMP 04/15/2010   SpO2 99%   Eyes: Conjunctivae/lids: Normal   ENT: Lips:  Normal  MSK: Normal  Cardiovascular: Peripheral edema none  Pulm: Breathing Normal  Neuro/Psych: Orientation: A/O x 3. Normal; Mood/Affect: Normal, NAD, WDWN  Pt accompanied by: self  Following areas examined: Scalp, face, eyelids, lips, neck, chest, abdomen, back, buttocks, and R&L upper and lower extremities. Pt defers exam of groin and genitals.   [place] skin type:i   Findings:  Red smooth well-defined macules on trunk and extremities.  Well circumscribed macules with symmetric color distribution on trunk and extremities.  Tan WD smooth macules on face, neck, trunk, and extremities.  Red lobular smooth papule on left inframammary fold 0.9cm  Pink atrophic macule with some scale on left posterior thigh 0.4cm    Assessment and Plan:     1) Cherry angiomas,  Benign nevi, Lentigines     I discussed the specifics of tumor, prognosis, and genetics of benign lesions.  I explained that treatment of these lesions would be purely cosmetic and not medically neccessary.  I discussed with patient different removal options including excision, cryotherapy, cautery and /or laser.  Lesion may recur and/or may not completely resolve. May need additional treatment.     2) Neoplasm of uncertain behavior on left inframammary fold 0.9cm  Rule out hemangioma  TANGENTIAL BIOPSY:  After consent, anesthesia with LEC and prep, tangential biopsy performed.  No complications and routine wound care.  May grow back and will get a scar. Based on lesion type may need to completely remove lesion. Patient will be notified in 7-10 days of results. Wound care directions given.  Base of lesion was dried with pressure and gauze and electrocautery for 2-3 s for 1 cycle.   3) Neoplasm of uncertain behavior on left posterior thigh 0.4cm  bcc vs scc   TANGENTIAL BIOPSY:  After consent, anesthesia with LEC and prep, tangential biopsy performed.  No  complications and routine wound care.  May grow back and will get a scar. Based on lesion type may need to completely remove lesion. Patient will be notified in 7-10 days of results. Wound care directions given.    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 Patient encouraged to perform monthly self skin exams and educated on how to perform them. UV precautions reviewed with patient. Patient was asked about new or changing moles/lesions on body.   Wear a sunscreen with at least SPF 30 on your face, ears, neck and V of the chest daily. Wear sunscreen on other areas of the body if those areas are exposed to the sun throughout the day. Sunscreens can contain physical and/or chemical blockers. Physical blockers are less likely to clog pores, these include zinc oxide and titanium dioxide. Reapply every two hour and after swimming. Sunscreen examples include Neutrogena, CeraVe, Blue Lizard, Elta MD and many others.    Proper skin care from [facility]:    -Eliminate harsh soaps as they strip the natural oils from the skin, often resulting in dry itchy skin ( i.e. Dial, Zest, [place] Spring)  -Use mild soaps such as Cetaphil or Dove Sensitive Skin in the shower. You do not need to use soap on arms, legs, and trunk every time you shower unless visibly soiled.   -Avoid hot or cold showers.  -After showering, lightly dry off and apply moisturizing within 2-3 minutes. This will help trap moisture in the skin.   -Aggressive use of a moisturizer at least 1-2 times a day to the entire body (including -Vanicream, Cetaphil, Aquaphor or Cerave) and moisturize hands after every washing.  -We recommend using moisturizers that come in a tub that needs to be scooped out, not a pump. This has more of an oil base. It will hold moisture in your skin much better than a water base moisturizer. The above  recommended are non-pore clogging.               Follow up in yearly

## 2020-02-19 NOTE — LETTER
2/19/2020         RE: Anurdaha Horn  [id number] [address]  [facility] [address]        Dear Colleague,    Thank you for referring your patient, Anuradha Horn, to the [facility]. Please see a copy of my visit note below.    HPI:  Anuradha Horn is a 49 year old female patient here today for spot under left brerast .  Patient states this has been present for a while.  Patient reports the following symptoms: bothersome, irritating, has bled .  Patient reports the following previous treatments: none.  Patient reports the following modifying factors: none.  Associated symptoms: none.  Patient has no other skin complaints today.  Remainder of the HPI, Meds, PMH, Allergies, FH, and SH was reviewed in chart.    Pertinent Hx:   Father had skin cancer and mother has precancers. No personal history of skin cancer.   Past Medical History:   Diagnosis Date     Infertility female      Normal delivery 05       Past Surgical History:   Procedure Laterality Date     D & C      following artifiscial insemination     SURGICAL HISTORY OF -   age 14    right hip slipped epiphysis        Family History   Problem Relation Age of Onset     Hypertension Father      Thyroid Disease Mother      Hypertension Mother      Breast Cancer Mother 75     Hypertension Paternal Grandmother      Asthma No family hx of      C.A.D. No family hx of      Diabetes No family hx of      Cerebrovascular Disease No family hx of      Cancer - colorectal No family hx of      Prostate Cancer No family hx of        Social History     Socioeconomic History     Marital status:      Spouse name: Not on file     Number of children: 2     Years of education: Not on file     Highest education level: Not on file   Occupational History     Occupation: dentist     Employer: [employer]   Social Needs     Financial resource strain: Not on file     Food insecurity:     Worry: Not on file      Inability: Not on file     Transportation needs:     Medical: Not on file     Non-medical: Not on file   Tobacco Use     Smoking status: Never Smoker     Smokeless tobacco: Never Used   Substance and Sexual Activity     Alcohol use: Yes     Comment: 2 drinks monthly      Drug use: No     Sexual activity: Yes     Partners: Male   Lifestyle     Physical activity:     Days per week: Not on file     Minutes per session: Not on file     Stress: Not on file   Relationships     Social connections:     Talks on phone: Not on file     Gets together: Not on file     Attends Je[employer] witness service: Not on file     Active member of club or organization: Not on file     Attends meetings of clubs or organizations: Not on file     Relationship status: Not on file     Intimate partner violence:     Fear of current or ex partner: Not on file     Emotionally abused: Not on file     Physically abused: Not on file     Forced sexual activity: Not on file   Other Topics Concern     Parent/sibling w/ CABG, MI or angioplasty before 65F 55M? No      Service No     Blood Transfusions No     Caffeine Concern No     Occupational Exposure No     Hobby Hazards No     Sleep Concern No     Stress Concern No     Weight Concern No     Special Diet No     Back Care No     Exercise Yes     Comment: 3 times per week     Bike Helmet No     Seat Belt Yes     Self-Exams Yes   Social History Narrative     Not on file       Outpatient Encounter Medications as of 2/19/2020   Medication Sig Dispense Refill     Ascorbic Acid (VITAMIN C PO)        estradiol (ESTRACE VAGINAL) 0.1 MG/GM vaginal cream 1 gm PV Q HS x 2 weeks, then 2-3 x's weekly thereafter 42.5 g 3     Multiple Vitamins-Minerals (DAILY MULTI PO) Take 1 capsule by mouth daily       No facility-administered encounter medications on file as of 2/19/2020.        Review Of Systems:  Skin: spot under left breast  Eyes: negative  Ears/Nose/Throat: negative  Respiratory: No shortness of breath, dyspnea  on exertion, cough, or hemoptysis  Cardiovascular: negative  Gastrointestinal: negative  Genitourinary: negative  Musculoskeletal: negative  Neurologic: negative  Psychiatric: negative  Hematologic/Lymphatic/Immunologic: negative  Endocrine: negative      Objective:     /79   Pulse 62   LMP 04/15/2010   SpO2 99%   Eyes: Conjunctivae/lids: Normal   ENT: Lips:  Normal  MSK: Normal  Cardiovascular: Peripheral edema none  Pulm: Breathing Normal  Neuro/Psych: Orientation: A/O x 3. Normal; Mood/Affect: Normal, NAD, WDWN  Pt accompanied by: self  Following areas examined: Scalp, face, eyelids, lips, neck, chest, abdomen, back, buttocks, and R&L upper and lower extremities. Pt defers exam of groin and genitals.   [place] skin type:i   Findings:  Red smooth well-defined macules on trunk and extremities.  Well circumscribed macules with symmetric color distribution on trunk and extremities.  Tan WD smooth macules on face, neck, trunk, and extremities.  Red lobular smooth papule on left inframammary fold 0.9cm  Pink atrophic macule with some scale on left posterior thigh 0.4cm    Assessment and Plan:     1) Cherry angiomas,  Benign nevi, Lentigines     I discussed the specifics of tumor, prognosis, and genetics of benign lesions.  I explained that treatment of these lesions would be purely cosmetic and not medically neccessary.  I discussed with patient different removal options including excision, cryotherapy, cautery and /or laser.  Lesion may recur and/or may not completely resolve. May need additional treatment.     2) Neoplasm of uncertain behavior on left inframammary fold 0.9cm  Rule out hemangioma  TANGENTIAL BIOPSY:  After consent, anesthesia with LEC and prep, tangential biopsy performed.  No complications and routine wound care.  May grow back and will get a scar. Based on lesion type may need to completely remove lesion. Patient will be notified in 7-10 days of results. Wound care directions  given.  Base of lesion was dried with pressure and gauze and electrocautery for 2-3 s for 1 cycle.   3) Neoplasm of uncertain behavior on left posterior thigh 0.4cm  bcc vs scc   TANGENTIAL BIOPSY:  After consent, anesthesia with LEC and prep, tangential biopsy performed.  No complications and routine wound care.  May grow back and will get a scar. Based on lesion type may need to completely remove lesion. Patient will be notified in 7-10 days of results. Wound care directions given.    Signs and Symptoms of non-melanoma skin cancer and ABCDEs of melanoma reviewed with patient. Patient encouraged to perform monthly self skin exams and educated on how to perform them. UV precautions reviewed with patient. Patient was asked about new or changing moles/lesions on body.   Wear a sunscreen with at least SPF 30 on your face, ears, neck and V of the chest daily. Wear sunscreen on other areas of the body if those areas are exposed to the sun throughout the day. Sunscreens can contain physical and/or chemical blockers. Physical blockers are less likely to clog pores, these include zinc oxide and titanium dioxide. Reapply every two hour and after swimming. Sunscreen examples include Neutrogena, CeraVe, Blue Lizard, Elta MD and many others.    Proper skin care from [facility]:    -Eliminate harsh soaps as they strip the natural oils from the skin, often resulting in dry itchy skin ( i.e. Dial, Zest, Fris[place] Spring)  -Use mild soaps such as Cetaphil or Dove Sensitive Skin in the shower. You do not need to use soap on arms, legs, and trunk every time you shower unless visibly soiled.   -Avoid hot or cold showers.  -After showering, lightly dry off and apply moisturizing within 2-3 minutes. This will help trap moisture in the skin.   -Aggressive use of a moisturizer at least 1-2 times a day to the entire body (including -Vanicream, Cetaphil, Aquaphor or Cerave) and moisturize hands after every washing.  -We recommend  using moisturizers that come in a tub that needs to be scooped out, not a pump. This has more of an oil base. It will hold moisture in your skin much better than a water base moisturizer. The above recommended are non-pore clogging.               Follow up in yearly       Again, thank you for allowing me to participate in the care of your patient.        Sincerely,        Madelyn Angel PA-C

## 2020-02-19 NOTE — PATIENT INSTRUCTIONS
Wound Care Instructions     FOR SUPERFICIAL WOUNDS     [facility] [phone]    [facility] [phone]          AFTER 24 HOURS YOU SHOULD REMOVE THE BANDAGE AND BEGIN DAILY DRESSING CHANGES AS FOLLOWS:     1) Remove Dressing.     2) Clean and dry the area with tap water using a Q-tip or sterile gauze pad.     3) Apply Vaseline, Aquaphor, Polysporin ointment or Bacitracin ointment over entire wound.  Do NOT use Neosporin ointment.     4) Cover the wound with a band-aid, or a sterile non-stick gauze pad and micropore paper tape      REPEAT THESE INSTRUCTIONS AT LEAST ONCE A DAY UNTIL THE WOUND HAS COMPLETELY HEALED.    It is an old wives tale that a wound heals better when it is exposed to air and allowed to dry out. The wound will heal faster with a better cosmetic result if it is kept moist with ointment and covered with a bandage.    **Do not let the wound dry out.**      Supplies Needed:      *Cotton tipped applicators (Q-tips)    *Polysporin Ointment or Bacitracin Ointment (NOT NEOSPORIN)    *Band-aids or non-stick gauze pads and micropore paper tape.      PATIENT INFORMATION:    During the healing process you will notice a number of changes. All wounds develop a small halo of redness surrounding the wound.  This means healing is occurring. Severe itching with extensive redness usually indicates sensitivity to the ointment or bandage tape used to dress the wound.  You should call our office if this develops.      Swelling  and/or discoloration around your surgical site is common, particularly when performed around the eye.    All wounds normally drain.  The larger the wound the more drainage there will be.  After 7-10 days, you will notice the wound beginning to shrink and new skin will begin to grow.  The wound is healed when you can see skin has formed over the entire area.  A healed wound has a healthy, shiny look to the surface and is red to dark pink in color to  normalize.  Wounds may take approximately 4-6 weeks to heal.  Larger wounds may take 6-8 weeks.  After the wound is healed you may discontinue dressing changes.    You may experience a sensation of tightness as your wound heals. This is normal and will gradually subside.    Your healed wound may be sensitive to temperature changes. This sensitivity improves with time, but if you re having a lot of discomfort, try to avoid temperature extremes.    Patients frequently experience itching after their wound appears to have healed because of the continue healing under the skin.  Plain Vaseline will help relieve the itching.        POSSIBLE COMPLICATIONS    BLEEDIN. Leave the bandage in place.  2. Use tightly rolled up gauze or a cloth to apply direct pressure over the bandage for 30  minutes.  3. Reapply pressure for an additional 30 minutes if necessary  4. Use additional gauze and tape to maintain pressure once the bleeding has stopped.            Proper skin care from [facility]:    -Eliminate harsh soaps as they strip the natural oils from the skin, often resulting in dry itchy skin ( i.e. Dial, Zest, [place] Spring)  -Use mild soaps such as Cetaphil or Dove Sensitive Skin in the shower. You do not need to use soap on arms, legs, and trunk every time you shower unless visibly soiled.   -Avoid hot or cold showers.  -After showering, lightly dry off and apply moisturizing within 2-3 minutes. This will help trap moisture in the skin.   -Aggressive use of a moisturizer at least 1-2 times a day to the entire body (including -Vanicream, Cetaphil, Aquaphor or Cerave) and moisturize hands after every washing.  -We recommend using moisturizers that come in a tub that needs to be scooped out, not a pump. This has more of an oil base. It will hold moisture in your skin much better than a water base moisturizer. The above recommended are non-pore clogging.      Wear a sunscreen with at least SPF 30 on your face,  ears, neck and V of the chest daily. Wear sunscreen on other areas of the body if those areas are exposed to the sun throughout the day. Sunscreens can contain physical and/or chemical blockers. Physical blockers are less likely to clog pores, these include zinc oxide and titanium dioxide. Reapply every two hour and after swimming. Sunscreen examples include Neutrogena, CeraVe, Blue Lizard, Elta MD and many others.    UV radiation  UVA radiation remains constant throughout the day and throughout the year. It is a longer wavelength than UVB and therefore penetrates deeper into the skin leading to immediate and delayed tanning, photoaging, and skin cancer. 70-80% of UVA and UVB radiation occurs between the hours of 10am-2pm.  UVB radiation  UVB radiation causes the most harmful effects and is more significant during the summer months. However, snow and ice can reflect UVB radiation leading to skin damage during the winter months as well. UVB radiation is responsible for tanning, burning, inflammation, delayed erythema (pinkness), pigmentation (brown spots), and skin cancer.

## 2020-02-24 LAB — COPATH REPORT: NORMAL

## 2020-07-28 ENCOUNTER — VIRTUAL VISIT (OUTPATIENT)
Dept: ENDOCRINOLOGY | Facility: CLINIC | Age: 50
End: 2020-07-28
Payer: COMMERCIAL

## 2020-07-28 DIAGNOSIS — Z13.820 SCREENING FOR OSTEOPOROSIS: ICD-10-CM

## 2020-07-28 DIAGNOSIS — E28.39 PREMATURE OVARIAN FAILURE: Primary | ICD-10-CM

## 2020-07-28 DIAGNOSIS — E04.1 THYROID NODULE: ICD-10-CM

## 2020-07-28 PROCEDURE — 99213 OFFICE O/P EST LOW 20 MIN: CPT | Mod: 95 | Performed by: INTERNAL MEDICINE

## 2020-07-28 NOTE — LETTER
"    7/28/2020         RE: Anuradha Horn  [address] [address] [place] [id number]        Dear Colleague,    Thank you for referring your patient, Anuradha Horn, to the [facility]. Please see a copy of my visit note below.    Anuradha Horn is a 49 year old female who is being evaluated via a billable video visit.      The patient has been notified of following:     \"This video visit will be conducted via a call between you and your physician/provider. We have found that certain health care needs can be provided without the need for an in-person physical exam.  This service lets us provide the care you need with a video conversation.  If a prescription is necessary we can send it directly to your pharmacy.  If lab work is needed we can place an order for that and you can then stop by our lab to have the test done at a later time.    Video visits are billed at different rates depending on your insurance coverage.  Please reach out to your insurance provider with any questions.    If during the course of the call the physician/provider feels a video visit is not appropriate, you will not be charged for this service.\"    Patient has given verbal consent for Video visit? Yes  How would you like to obtain your AVS? MyChart  If you are dropped from the video visit, the video invite should be resent to: MyChart  Will anyone else be joining your video visit? No      Endocrinology Video Visit    Chief Complaint: Thyroid Disease     Information obtained from:Patient    Subjective:         HPI: Anuradha Horn is a 49 year old female with history of Thyroid nodules.     49-year-old female patient who was found to have a thyroid nodule on routine exam.  Follow-up ultrasound was done in November 2018 which showed multiple nodules described below.  Right lobe:  Nodule 1:  Nodule measurement: 0.8 x 0.3 x 0.7 cm  Nodule 2:  Nodule measurement: 3.5 x 1.8 x 3.6 cm  Isthmus: "   Nodule 3:  Nodule measurement: 0.3 x 0.6 x 0.9 cm  Left Lobe:   Nodule 4:  Nodule measurement: 0.4 x 0.3 x 0.5 cm  Nodule 5:  Nodule measurement: 0.6 x 0.4 x 0.6 cm    The nodule on the right side measuring 3.5 x 1.8 x 3.6 cm was biopsied and 11/2018 and results as documented below were benign.    Thyroid, right nodule #2, ultrasound guided fine needle aspiration        Thyroid, right nodule #2, ultrasound guided fine needle aspiration:     Benign   Consistent with a benign nodule (includes adenomatoid nodule, colloid   nodule, etc.)    No family history of thyroid cancer or radiation treatment as a child.  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  Menopause was at the age of 41; because of early menopause she underwent a DEXA scan study 5 years ago.  The Z scores were above -2.    Lumbar spine Z-score in region of L1-L4 = -1.6    HIPS:  Mean total hip Z-score: -1.4   Left femoral neck Z-score = -1.1  Right femoral neck Z-score= Was not obtained.  Radius 33% Z-score = 0.3    No fractures.      No Known Allergies    Current Outpatient Medications   Medication Sig Dispense Refill     Ascorbic Acid (VITAMIN C PO)        estradiol (ESTRACE VAGINAL) 0.1 MG/GM vaginal cream 1 gm PV Q HS x 2 weeks, then 2-3 x's weekly thereafter 42.5 g 3     Multiple Vitamins-Minerals (DAILY MULTI PO) Take 1 capsule by mouth daily         Review of Systems     8 point review system (Constitutional, HENT, Eyes, Respiratory, Cardiovascular, Gastrointestinal, Genitourinary, Musculoskeletal,Neurological, Psychiatric/Behavioural, Endocrine) is negative or is as per HPI above    Past Medical History:   Diagnosis Date     Infertility female      Normal delivery 05       Past Surgical History:   Procedure Laterality Date     D & C      following artifiscial insemination     SURGICAL HISTORY OF -   age 14    right hip slipped epiphysis       Family History   Problem Relation Age of Onset     Hypertension Father      Thyroid Disease  Mother      Hypertension Mother      Breast Cancer Mother 75     Hypertension Paternal Grandmother      Asthma No family hx of      C.A.D. No family hx of      Diabetes No family hx of      Cerebrovascular Disease No family hx of      Cancer - colorectal No family hx of      Prostate Cancer No family hx of        Social History     Socioeconomic History     Marital status:      Spouse name: None     Number of children: 2     Years of education: None     Highest education level: None   Social Needs     Financial resource strain: None     Food insecurity - worry: None     Food insecurity - inability: None     Transportation needs - medical: None     Transportation needs - non-medical: None   Occupational History     Occupation: dentist     Employer: [employer]   Tobacco Use     Smoking status: Never Smoker     Smokeless tobacco: Never Used   Substance and Sexual Activity     Alcohol use: Yes     Comment: 2 drinks monthly      Drug use: No     Sexual activity: Yes     Partners: Male   Other Topics Concern     Parent/sibling w/ CABG, MI or angioplasty before 65F 55M? No      Service No     Blood Transfusions No     Caffeine Concern No     Occupational Exposure No     Hobby Hazards No     Sleep Concern No     Stress Concern No     Weight Concern No     Special Diet No     Back Care No     Exercise Yes     Comment: 3 times per week     Bike Helmet No     Seat Belt Yes     Self-Exams Yes   Social History Narrative     None       Objective:   LMP 04/15/2010   Constitutional: Pleasant no acute cardiopulmonary distress.   EYES: anicteric, normal extra-ocular movements.   Neurological: Alert and oriented.    Psychological: appropriate mood and affect     In House Labs:       TSH   Date Value Ref Range Status   11/05/2018 1.39 0.40 - 4.00 mU/L Final   07/24/2014 1.03 0.4 - 5.0 mU/L Final       Creatinine   Date Value Ref Range Status   05/06/2010 0.75 0.52 - 1.04 mg/dL Final     Comment:     New  IDMS-traceable calibration  beginning 5/1/08   ]    Assessment/Treatment Plan:      Multiple thyroid nodules: Dominant right-sided thyroid nodule measuring 3.5 cm; was biopsied previously and it was benign.  Other nodules are less than a centimeter and no indication for biopsy at this point in time.  Recommended follow-up thyroid ultrasound and this was ordered today.  No compressive symptoms. TSH ordered.     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  At that time she was just 2 years after menopause.  Would be reasonable to repeat DEXA scan with her next preventive health care.  DEXA scan was ordered.  Optimize calcium and vitamin D intake in the meantime. Calcium and vitamin D labs ordered.     Patient Instructions   It was a pleasure talking to you Anuradha.     Premature Ovarian failure is a risk factor for osteoporosis.  I recommend checking DEXA scan at your convenience.  Please make sure that you take vitamin D 1000 international units daily.  This is available over-the-counter.  Calcium supplement from all sources about 9551-8478 mg is recommended daily.    From multiple thyroid nodules standpoint-we will schedule follow-up thyroid ultrasound and I will contact you after the result is available.    Positive DEXA scan thyroid ultrasound can be done at your convenience(if desired post COVID).     Please let me know if you have any questions.      I will contact the patient with the test results.  Return to clinic in 12 months.    Test and/or medications prescribed today:  Orders Placed This Encounter   Procedures     US Thyroid     Dexa hip/pelvis/spine     TSH with free T4 reflex     Vitamin D Deficiency (D3 Only)     Albumin level     Alkaline phosphatase     Calcium     Creatinine     Urea nitrogen         David Barnard MD  Staff Endocrinologist    [phone]  Division of Endocrinology and Diabetes          Video-Visit Details    Type of service:   Video Visit= total time 10 minutes    Originating Location (pt. Location): Home    Distant Location (provider location):  [facility]     Platform used for Video Visit: Ness              Again, thank you for allowing me to participate in the care of your patient.        Sincerely,        David Barnard MD

## 2020-07-28 NOTE — PATIENT INSTRUCTIONS
It was a pleasure talking to you Anuradha.     Premature Ovarian failure is a risk factor for osteoporosis.  I recommend checking DEXA scan at your convenience.  Please make sure that you take vitamin D 1000 international units daily.  This is available over-the-counter.  Calcium supplement from all sources about 1786-8715 mg is recommended daily.    From multiple thyroid nodules standpoint-we will schedule follow-up thyroid ultrasound and I will contact you after the result is available.    Positive DEXA scan thyroid ultrasound can be done at your convenience(if desired post COVID).     Please let me know if you have any questions.

## 2020-07-28 NOTE — PROGRESS NOTES
"Anuradha Horn is a 49 year old female who is being evaluated via a billable video visit.      The patient has been notified of following:     \"This video visit will be conducted via a call between you and your physician/provider. We have found that certain health care needs can be provided without the need for an in-person physical exam.  This service lets us provide the care you need with a video conversation.  If a prescription is necessary we can send it directly to your pharmacy.  If lab work is needed we can place an order for that and you can then stop by our lab to have the test done at a later time.    Video visits are billed at different rates depending on your insurance coverage.  Please reach out to your insurance provider with any questions.    If during the course of the call the physician/provider feels a video visit is not appropriate, you will not be charged for this service.\"    Patient has given verbal consent for Video visit? Yes  How would you like to obtain your AVS? MyChart  If you are dropped from the video visit, the video invite should be resent to: MyChart  Will anyone else be joining your video visit? No      Endocrinology Video Visit    Chief Complaint: Thyroid Disease     Information obtained from:Patient    Subjective:         HPI: Anuradha Horn is a 49 year old female with history of Thyroid nodules.     49-year-old female patient who was found to have a thyroid nodule on routine exam.  Follow-up ultrasound was done in November 2018 which showed multiple nodules described below.  Right lobe:  Nodule 1:  Nodule measurement: 0.8 x 0.3 x 0.7 cm  Nodule 2:  Nodule measurement: 3.5 x 1.8 x 3.6 cm  Isthmus:   Nodule 3:  Nodule measurement: 0.3 x 0.6 x 0.9 cm  Left Lobe:   Nodule 4:  Nodule measurement: 0.4 x 0.3 x 0.5 cm  Nodule 5:  Nodule measurement: 0.6 x 0.4 x 0.6 cm    The nodule on the right side measuring 3.5 x 1.8 x 3.6 cm was biopsied and 11/2018 and results as " documented below were benign.    Thyroid, right nodule #2, ultrasound guided fine needle aspiration        Thyroid, right nodule #2, ultrasound guided fine needle aspiration:     Benign   Consistent with a benign nodule (includes adenomatoid nodule, colloid   nodule, etc.)    No family history of thyroid cancer or radiation treatment as a child.  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  Menopause was at the age of 41; because of early menopause she underwent a DEXA scan study 5 years ago.  The Z scores were above -2.    Lumbar spine Z-score in region of L1-L4 = -1.6    HIPS:  Mean total hip Z-score: -1.4   Left femoral neck Z-score = -1.1  Right femoral neck Z-score= Was not obtained.  Radius 33% Z-score = 0.3    No fractures.      No Known Allergies    Current Outpatient Medications   Medication Sig Dispense Refill     Ascorbic Acid (VITAMIN C PO)        estradiol (ESTRACE VAGINAL) 0.1 MG/GM vaginal cream 1 gm PV Q HS x 2 weeks, then 2-3 x's weekly thereafter 42.5 g 3     Multiple Vitamins-Minerals (DAILY MULTI PO) Take 1 capsule by mouth daily         Review of Systems     8 point review system (Constitutional, HENT, Eyes, Respiratory, Cardiovascular, Gastrointestinal, Genitourinary, Musculoskeletal,Neurological, Psychiatric/Behavioural, Endocrine) is negative or is as per HPI above    Past Medical History:   Diagnosis Date     Infertility female      Normal delivery 05       Past Surgical History:   Procedure Laterality Date     D & C      following artifiscial insemination     SURGICAL HISTORY OF -   age 14    right hip slipped epiphysis       Family History   Problem Relation Age of Onset     Hypertension Father      Thyroid Disease Mother      Hypertension Mother      Breast Cancer Mother 75     Hypertension Paternal Grandmother      Asthma No family hx of      C.A.D. No family hx of      Diabetes No family hx of      Cerebrovascular Disease No family hx of      Cancer - colorectal No family hx  of      Prostate Cancer No family hx of        Social History     Socioeconomic History     Marital status:      Spouse name: None     Number of children: 2     Years of education: None     Highest education level: None   Social Needs     Financial resource strain: None     Food insecurity - worry: None     Food insecurity - inability: None     Transportation needs - medical: None     Transportation needs - non-medical: None   Occupational History     Occupation: dentist     Employer: [employer]   Tobacco Use     Smoking status: Never Smoker     Smokeless tobacco: Never Used   Substance and Sexual Activity     Alcohol use: Yes     Comment: 2 drinks monthly      Drug use: No     Sexual activity: Yes     Partners: Male   Other Topics Concern     Parent/sibling w/ CABG, MI or angioplasty before 65F 55M? No      Service No     Blood Transfusions No     Caffeine Concern No     Occupational Exposure No     Hobby Hazards No     Sleep Concern No     Stress Concern No     Weight Concern No     Special Diet No     Back Care No     Exercise Yes     Comment: 3 times per week     Bike Helmet No     Seat Belt Yes     Self-Exams Yes   Social History Narrative     None       Objective:   LMP 04/15/2010   Constitutional: Pleasant no acute cardiopulmonary distress.   EYES: anicteric, normal extra-ocular movements.   Neurological: Alert and oriented.    Psychological: appropriate mood and affect     In House Labs:       TSH   Date Value Ref Range Status   11/05/2018 1.39 0.40 - 4.00 mU/L Final   07/24/2014 1.03 0.4 - 5.0 mU/L Final       Creatinine   Date Value Ref Range Status   05/06/2010 0.75 0.52 - 1.04 mg/dL Final     Comment:     New IDMS-traceable calibration  beginning 5/1/08   ]    Assessment/Treatment Plan:      Multiple thyroid nodules: Dominant right-sided thyroid nodule measuring 3.5 cm; was biopsied previously and it was benign.  Other nodules are less than a centimeter and no indication  for biopsy at this point in time.  Recommended follow-up thyroid ultrasound and this was ordered today.  No compressive symptoms. TSH ordered.     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  At that time she was just 2 years after menopause.  Would be reasonable to repeat DEXA scan with her next preventive health care.  DEXA scan was ordered.  Optimize calcium and vitamin D intake in the meantime. Calcium and vitamin D labs ordered.     Patient Instructions   It was a pleasure talking to you Anuradha.     Premature Ovarian failure is a risk factor for osteoporosis.  I recommend checking DEXA scan at your convenience.  Please make sure that you take vitamin D 1000 international units daily.  This is available over-the-counter.  Calcium supplement from all sources about 9514-9548 mg is recommended daily.    From multiple thyroid nodules standpoint-we will schedule follow-up thyroid ultrasound and I will contact you after the result is available.    Positive DEXA scan thyroid ultrasound can be done at your convenience(if desired post COVID).     Please let me know if you have any questions.      I will contact the patient with the test results.  Return to clinic in 12 months.    Test and/or medications prescribed today:  Orders Placed This Encounter   Procedures     US Thyroid     Dexa hip/pelvis/spine     TSH with free T4 reflex     Vitamin D Deficiency (D3 Only)     Albumin level     Alkaline phosphatase     Calcium     Creatinine     Urea nitrogen         David Barnard MD  Staff Endocrinologist    [phone]  Division of Endocrinology and Diabetes          Video-Visit Details    Type of service:  Video Visit= total time 10 minutes    Originating Location (pt. Location): Home    Distant Location (provider location):  [facility]     Platform used for Video Visit: Ness

## 2020-07-30 DIAGNOSIS — E04.1 THYROID NODULE: ICD-10-CM

## 2020-07-30 DIAGNOSIS — E28.39 PREMATURE OVARIAN FAILURE: ICD-10-CM

## 2020-07-30 LAB
ALBUMIN SERPL-MCNC: 3.9 G/DL (ref 3.4–5)
ALP SERPL-CCNC: 88 U/L (ref 40–150)
BUN SERPL-MCNC: 13 MG/DL (ref 7–30)
CALCIUM SERPL-MCNC: 8.7 MG/DL (ref 8.5–10.1)
CREAT SERPL-MCNC: 0.7 MG/DL (ref 0.52–1.04)
GFR SERPL CREATININE-BSD FRML MDRD: >90 ML/MIN/{1.73_M2}
TSH SERPL DL<=0.005 MIU/L-ACNC: 1.2 MU/L (ref 0.4–4)

## 2020-07-30 PROCEDURE — 36415 COLL VENOUS BLD VENIPUNCTURE: CPT | Performed by: INTERNAL MEDICINE

## 2020-07-30 PROCEDURE — 82310 ASSAY OF CALCIUM: CPT | Performed by: INTERNAL MEDICINE

## 2020-07-30 PROCEDURE — 84075 ASSAY ALKALINE PHOSPHATASE: CPT | Performed by: INTERNAL MEDICINE

## 2020-07-30 PROCEDURE — 82565 ASSAY OF CREATININE: CPT | Performed by: INTERNAL MEDICINE

## 2020-07-30 PROCEDURE — 82040 ASSAY OF SERUM ALBUMIN: CPT | Performed by: INTERNAL MEDICINE

## 2020-07-30 PROCEDURE — 84520 ASSAY OF UREA NITROGEN: CPT | Performed by: INTERNAL MEDICINE

## 2020-07-30 PROCEDURE — 82306 VITAMIN D 25 HYDROXY: CPT | Performed by: INTERNAL MEDICINE

## 2020-07-30 PROCEDURE — 84443 ASSAY THYROID STIM HORMONE: CPT | Performed by: INTERNAL MEDICINE

## 2020-07-31 LAB — DEPRECATED CALCIDIOL+CALCIFEROL SERPL-MC: 32 UG/L (ref 20–75)

## 2020-08-03 NOTE — RESULT ENCOUNTER NOTE
Anuradha,  1.  The thyroid blood work results are within the normal limits.  2.  The vitamin D level is within the normal limits.  It is in the low normal range.  Please make sure that you take vitamin D supplement at least 1000 international units daily.  2.  Calcium and other related labs including the kidney function result is within the normal limits.    Please let me know if you have any questions regarding this results.    David Barnard MD

## 2020-08-06 ENCOUNTER — ANCILLARY PROCEDURE (OUTPATIENT)
Dept: ULTRASOUND IMAGING | Facility: CLINIC | Age: 50
End: 2020-08-06
Attending: INTERNAL MEDICINE
Payer: COMMERCIAL

## 2020-08-06 ENCOUNTER — ANCILLARY PROCEDURE (OUTPATIENT)
Dept: BONE DENSITY | Facility: CLINIC | Age: 50
End: 2020-08-06
Attending: INTERNAL MEDICINE
Payer: COMMERCIAL

## 2020-08-06 DIAGNOSIS — E04.1 THYROID NODULE: ICD-10-CM

## 2020-08-06 DIAGNOSIS — E28.39 PREMATURE OVARIAN FAILURE: ICD-10-CM

## 2020-08-06 DIAGNOSIS — M89.9 DISORDER OF BONE: ICD-10-CM

## 2020-08-06 DIAGNOSIS — Z13.820 SCREENING FOR OSTEOPOROSIS: ICD-10-CM

## 2020-08-06 PROCEDURE — 76536 US EXAM OF HEAD AND NECK: CPT | Performed by: RADIOLOGY

## 2020-08-06 PROCEDURE — 77081 DXA BONE DENSITY APPENDICULR: CPT | Mod: 59 | Performed by: RADIOLOGY

## 2020-08-06 PROCEDURE — 77080 DXA BONE DENSITY AXIAL: CPT | Performed by: RADIOLOGY

## 2020-08-13 ENCOUNTER — TELEPHONE (OUTPATIENT)
Dept: ENDOCRINOLOGY | Facility: CLINIC | Age: 50
End: 2020-08-13

## 2020-08-13 NOTE — RESULT ENCOUNTER NOTE
Anuradha,    Attached please find detailed thyroid ultrasound findings.  #1 previously biopsied right sided thyroid nodule previously measuring 3.5 cm in diameter has slightly increased in size and currently measures about 4 cm in diameter.  This nodule has been previously biopsied and biopsy results was negative.    #2 the nodule in the mid section of thyroid gland also known as isthmus and right-sided nodule labeled as #1 in this report has slightly increased in size compared to the previous ultrasound findings.  The interval increase in size is minimal overall.  I recommend repeating thyroid ultrasound follow-up in 1 year based to closely follow-up nodule #1 on the right side and nodule in the middle section or known as isthmus.    I called you to further discuss this with you over the phone and left a message.  I will be happy to call you next week and then further discuss with you.    Happy birthday again.    David Barnard MD

## 2020-08-13 NOTE — RESULT ENCOUNTER NOTE
Anuradha,    Attached please find bone density results.  The Z score of bone density particularly at the spine indicates osteoporosis.  Compared to your previous DEXA scan from 2014 there is significant worsening of bone density.  I would recommend bone specific therapy in addition to calcium and vitamin D.  Again, one of the reason I was calling you was to further discuss next steps regarding this issue and I will call back and discuss next week.    Thank you,    David Barnard MD

## 2020-08-13 NOTE — TELEPHONE ENCOUNTER
"Called patient to discuss thyroid ultrasound findings and bone density scan findings.  No answer.  Left a message stating that we will call her back next week.  \"  Written by David Barnard MD on 8/13/2020  1:15 PM   Anuradha,     Attached please find detailed thyroid ultrasound findings.   #1 previously biopsied right sided thyroid nodule previously measuring 3.5 cm in diameter has slightly increased in size and currently measures about 4 cm in diameter.  This nodule has been previously biopsied and biopsy results was negative.     #2 the nodule in the mid section of thyroid gland also known as isthmus and right-sided nodule labeled as #1 in this report has slightly increased in size compared to the previous ultrasound findings.  The interval increase in size is minimal overall.  I recommend repeating thyroid ultrasound follow-up in 1 year based to closely follow-up nodule #1 on the right side and nodule in the middle section or known as isthmus.     I called you to further discuss this with you over the phone and left a message.  I will be happy to call you next week and then further discuss with you.     Happy birthday again.     David Barnard MD   \"    The above my chart message sent to the patient.  \"  Anruadha,       Attached please find bone density results.  The Z score of bone density particularly at the spine indicates osteoporosis.  Compared to your previous DEXA scan from 2014 there is significant worsening of bone density.  I would recommend bone specific therapy in addition to calcium and vitamin D.  Again, one of the reason I was calling you was to further discuss next steps regarding this issue and I will call back and discuss next week.       Thank you,       David Barnard MD \"      "

## 2020-08-19 ENCOUNTER — TELEPHONE (OUTPATIENT)
Dept: ENDOCRINOLOGY | Facility: CLINIC | Age: 50
End: 2020-08-19

## 2020-08-19 NOTE — TELEPHONE ENCOUNTER
CLINIC COORDINATOR SCHEDULING NOTES    CALL RESULT: LVM    APPT TYPE: VIRTUAL VISIT RETURN    PROVIDER: Evon    DATE APPT NEEDED: today, 8/19    ADDITIONAL NOTES: If pt declines to schedule for 8/19, schedule next available and route to coordinator pool. If pt schedules for today, please route to [facility] ENDOCRINOLOGY ADULT, urgent, so they can call for rooming data.

## 2020-08-20 NOTE — TELEPHONE ENCOUNTER
[facility] Call Center    Phone Message    May a detailed message be left on voicemail: yes     Reason for Call: Patient is scheduled for 9/22 for video visit with Dr. Barnard. Please advise. Thank you.    Action Taken: Message routed to:  Adult Clinics: Endocrinology p [phone]    Travel Screening: Not Applicable

## 2020-08-28 NOTE — TELEPHONE ENCOUNTER
"Hi Dr. Barnard,     This patient is scheduled for a video visit on 9/22/20 to discuss the Dexa scan. I wanted to make sure that this is ok or does she need to be seen sooner? Kathya Vang and Danica are all out of the office today.     Thanks,   Araseli Cwoan CMA    \"That works too.\" Response from Dr. Barnard      Patient informed and did not have any questions or concerns at this time.    Araseli Cowan CMA  Adult Endocrinology  [facility]    "

## 2020-09-22 ENCOUNTER — MYC MEDICAL ADVICE (OUTPATIENT)
Dept: ENDOCRINOLOGY | Facility: CLINIC | Age: 50
End: 2020-09-22

## 2020-09-22 ENCOUNTER — VIRTUAL VISIT (OUTPATIENT)
Dept: ENDOCRINOLOGY | Facility: CLINIC | Age: 50
End: 2020-09-22
Payer: COMMERCIAL

## 2020-09-22 DIAGNOSIS — M81.0 OSTEOPOROSIS WITHOUT CURRENT PATHOLOGICAL FRACTURE, UNSPECIFIED OSTEOPOROSIS TYPE: Primary | ICD-10-CM

## 2020-09-22 DIAGNOSIS — E04.1 THYROID NODULE: Primary | ICD-10-CM

## 2020-09-22 DIAGNOSIS — E28.39 PREMATURE OVARIAN FAILURE: ICD-10-CM

## 2020-09-22 PROCEDURE — 99214 OFFICE O/P EST MOD 30 MIN: CPT | Mod: 95 | Performed by: INTERNAL MEDICINE

## 2020-09-22 RX ORDER — ALENDRONATE SODIUM 70 MG/1
70 TABLET ORAL
Qty: 12 TABLET | Refills: 3 | Status: SHIPPED | OUTPATIENT
Start: 2020-09-22 | End: 2021-07-27

## 2020-09-22 NOTE — PATIENT INSTRUCTIONS
Anuradha,     Here are the blood work orders placed today. Please call the lab at the number provided to schedule an appointment.   Orders Placed This Encounter   Procedures     Albumin level     Alkaline phosphatase     Calcium     Phosphorus     Parathyroid Hormone Intact     Creatinine     Urea nitrogen     Tissue transglutaminase dominga IgA and IgG     General measures for osteoporosis management      Weight bearing Exercises; walking     Fall prevention    Adequate Calcium and vitamin D intake: for maintenance calcium 1200 mg daily and vitamin D 1000 IU daily from all sources.      Take alendronate 70 mg every week by mouth every 7 days. Take 60 minutes before am meal with 8 oz. water. Remain upright for 30 minutes.       Here is additional information on alendronate     Patient Education     Alendronate tablets  Brand Name: Fosamax  What is this medicine?  ALENDRONATE (a BELÉN droe ketan) slows calcium loss from bones. It helps to make normal healthy bone and to slow bone loss in people with Paget's disease and osteoporosis. It may be used in others at risk for bone loss.  How should I use this medicine?  You must take this medicine exactly as directed or you will lower the amount of the medicine you absorb into your body or you may cause yourself harm. Take this medicine by mouth first thing in the morning, after you are up for the day. Do not eat or drink anything before you take your medicine. Swallow the tablet with a full glass (6 to 8 fluid ounces) of plain water. Do not take this medicine with any other drink. Do not chew or crush the tablet. After taking this medicine, do not eat breakfast, drink, or take any medicines or vitamins for at least 30 minutes. Sit or stand up for at least 30 minutes after you take this medicine; do not lie down. Do not take your medicine more often than directed.  Talk to your pediatrician regarding the use of this medicine in children. Special care may be needed.  What side effects  may I notice from receiving this medicine?  Side effects that you should report to your doctor or health care professional as soon as possible:    allergic reactions like skin rash, itching or hives, swelling of the face, lips, or tongue    black or tarry stools    bone, muscle or joint pain    changes in vision    chest pain    heartburn or stomach pain    jaw pain, especially after dental work    pain or trouble when swallowing    redness, blistering, peeling or loosening of the skin, including inside the mouth  Side effects that usually do not require medical attention (report to your doctor or health care professional if they continue or are bothersome):    changes in taste    diarrhea or constipation    eye pain or itching    headache    nausea or vomiting    stomach gas or fullness  What may interact with this medicine?    aluminum hydroxide    antacids    aspirin    calcium supplements    drugs for inflammation like ibuprofen, naproxen, and others    iron supplements    magnesium supplements    vitamins with minerals    What if I miss a dose?  If you miss a dose, do not take it later in the day. Continue your normal schedule starting the next morning. Do not take double or extra doses.  Where should I keep my medicine?  Keep out of the reach of children.  Store at room temperature of 15 and 30 degrees C (59 and 86 degrees F). Throw away any unused medicine after the expiration date.  What should I tell my health care provider before I take this medicine?  They need to know if you have any of these conditions:    dental disease    esophagus, stomach, or intestine problems, like acid reflux or GERD    kidney disease    low blood calcium    low vitamin D    problems sitting or standing 30 minutes    trouble swallowing    an unusual or allergic reaction to alendronate, other medicines, foods, dyes, or preservatives    pregnant or trying to get pregnant    breast-feeding  What should I watch for while using this  medicine?  Visit your doctor or health care professional for regular checks ups. It may be some time before you see benefit from this medicine. Do not stop taking your medicine except on your doctor's advice. Your doctor or health care professional may order blood tests and other tests to see how you are doing.  You should make sure you get enough calcium and vitamin D while you are taking this medicine, unless your doctor tells you not to. Discuss the foods you eat and the vitamins you take with your health care professional.  Some people who take this medicine have severe bone, joint, and/or muscle pain. This medicine may also increase your risk for a broken thigh bone. Tell your doctor right away if you have pain in your upper leg or groin. Tell your doctor if you have any pain that does not go away or that gets worse.  This medicine can make you more sensitive to the sun. If you get a rash while taking this medicine, sunlight may cause the rash to get worse. Keep out of the sun. If you cannot avoid being in the sun, wear protective clothing and use sunscreen. Do not use sun lamps or tanning beds/booths.  NOTE:This sheet is a summary. It may not cover all possible information. If you have questions about this medicine, talk to your doctor, pharmacist, or health care provider. Copyright  2019 [employer]

## 2020-09-22 NOTE — LETTER
"    9/22/2020         RE: Anuradha Horn  [id number] [address] [place] [id number]        Dear Colleague,    Thank you for referring your patient, Anuradha Horn, to the [facility]. Please see a copy of my visit note below.          Anuradha Horn is a 50 year old female who is being evaluated via a billable video visit.      The patient has been notified of following:     \"This video visit will be conducted via a call between you and your physician/provider. We have found that certain health care needs can be provided without the need for an in-person physical exam.  This service lets us provide the care you need with a video conversation.  If a prescription is necessary we can send it directly to your pharmacy.  If lab work is needed we can place an order for that and you can then stop by our lab to have the test done at a later time.    Video visits are billed at different rates depending on your insurance coverage.  Please reach out to your insurance provider with any questions.    If during the course of the call the physician/provider feels a video visit is not appropriate, you will not be charged for this service.\"    Patient has given verbal consent for Video visit? Yes  How would you like to obtain your AVS? MyChart  If you are dropped from the video visit, the video invite should be resent to: Other e-mail: my chart  Will anyone else be joining your video visit? No     CEndocrinology Video Visit    Chief Complaint: Endocrine Problem (follow up)     Information obtained from:Patient    Subjective:         HPI: Anuradha Horn is a 50 year old female here for osteoporosis.     Menopause was at the age of 41; because of early menopause she underwent a DEXA scan study 6 years ago.  The Z scores were above -2. Follow up study showed worsening bone density with -15.4 change compared to the previous at the spine and -9.1 at hip.  Details are copied below.  The " main risk factor is again menopause 10 years ago due to premature ovarian failure.  She has not been treated with hormone replacement therapy.  No history of fractures.  Denies loss of loss of height.  No family history of osteoporosis.  No history of chronic medical conditions known to predispose for osteoporosis including diabetes, rheumatoid arthritis.  She does not take steroids currently or no past history of steroid intake.  She does not take calcium or vitamin D on regular basis however she has a multivitamin she takes once in a while.  No regular exercise.            Right Lobe:  Nodule 1:  Location: Mid right lobe  Size: 1.2 x 1 x 0.5 cm, previously 0.8 x 0.3 x 0.7 cm  Composition: Solid or almost completely solid (2 points)  Echogenicity: Hypoechoic (2 points)  Shape: Wider than tall (0 points)  Margin: Smooth (0 points)  Echogenic Foci: None or large comet tail artifact (0 points)  Stability: Slight increase in size  TIRADS: TR4 (4-6 points)      Nodule 2:  Location: Right mid lobe  Size: 4 x 3.8 x 2 cm, previously 3.5 x 1.8 x 3.6 cm  Composition: Solid or almost completely solid (2 points)  Echogenicity: Hypoechoic (2 points)  Shape: Wider than tall (0 points)  Margin: Smooth (0 points)  Echogenic Foci: None or large comet tail artifact (0 points)  Stability: Minimal increase  TIRADS: TR4 (4-6 points)   FNAC  11/14/2018 suggestive of benign etiology.     Isthmus:  Location: Isthmus   Size: 1.1 x 0.7 x 0.4 cm, previously 0.9 x 0.6 x 0.3 cm  Composition: Solid or almost completely solid (2 points)  Echogenicity: Hypoechoic (2 points)  Shape: Wider than tall (0 points)  Margin: Smooth (0 points)  Echogenic Foci: None or large comet tail artifact (0 points)  Stability:  Minimal increase    TIRADS: TR4 (4-6 points)      Left Lobe:     Nodule 1:  Location: Mid left lobe  Size: 0.7 x 0.6 x 0.4 cm, previously 0.4 x 0.3 x 0.5 cm  Composition: Solid or almost completely solid (2 points)  Echogenicity: Very  hypoechoic (3 points)  Shape: Wider than tall (0 points)  Margin: Ill-defined (0 points)  Echogenic Foci: None or large comet tail artifact (0 points)  Stability: Slight increase in size  TIRADS: TR4 (4-6 points)         Nodule 2:  Location: Inferior left lobe  Size: 0.6 x 0.7 x 0.4 cm, previously 0.6 x 0.4 x 0.6 cm  Composition: Solid or almost completely solid (2 points)  Echogenicity: Hyperechoic or isoechoic (1 point)  Shape: Wider than tall (0 points)  Margin: Smooth (0 points)  Echogenic Foci: Peripheral/rim calcifications (2 points)  Stability: No significant change in size  TIRADS: TR4 (4-6 points)        Thyroid, right nodule #2, ultrasound guided fine needle aspiration        Thyroid, right nodule #2, ultrasound guided fine needle aspiration:     Benign   Consistent with a benign nodule (includes adenomatoid nodule, colloid   nodule, etc.)    No family history of thyroid cancer or radiation treatment as a child.  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     No fractures.      No Known Allergies    Current Outpatient Medications   Medication Sig Dispense Refill     alendronate (FOSAMAX) 70 MG tablet Take 1 tablet (70 mg) by mouth every 7 days Take 60 minutes before am meal with 8 oz. water. Remain upright for 30 minutes. 12 tablet 3     Ascorbic Acid (VITAMIN C PO)        calcium citrate-vitamin D (CITRACAL W/D) 250-100 MG-UNIT tablet Take 2 tablets by mouth 2 times daily 180 tablet 3     estradiol (ESTRACE VAGINAL) 0.1 MG/GM vaginal cream 1 gm PV Q HS x 2 weeks, then 2-3 x's weekly thereafter 42.5 g 3     Multiple Vitamins-Minerals (DAILY MULTI PO) Take 1 capsule by mouth daily         Review of Systems     8 point review system (Constitutional, HENT, Eyes, Respiratory, Cardiovascular, Gastrointestinal, Genitourinary, Musculoskeletal,Neurological, Psychiatric/Behavioural, Endocrine) is negative or is as per HPI above    Past Medical History:   Diagnosis Date     Infertility female       Normal delivery 05       Past Surgical History:   Procedure Laterality Date     D & C      following artifiscial insemination     SURGICAL HISTORY OF -   age 14    right hip slipped epiphysis       Family History   Problem Relation Age of Onset     Hypertension Father      Thyroid Disease Mother      Hypertension Mother      Breast Cancer Mother 75     Hypertension Paternal Grandmother      Asthma No family hx of      C.A.D. No family hx of      Diabetes No family hx of      Cerebrovascular Disease No family hx of      Cancer - colorectal No family hx of      Prostate Cancer No family hx of        Social History     Socioeconomic History     Marital status:      Spouse name: None     Number of children: 2     Years of education: None     Highest education level: None   Social Needs     Financial resource strain: None     Food insecurity - worry: None     Food insecurity - inability: None     Transportation needs - medical: None     Transportation needs - non-medical: None   Occupational History     Occupation: dentist     Employer: [employer]   Tobacco Use     Smoking status: Never Smoker     Smokeless tobacco: Never Used   Substance and Sexual Activity     Alcohol use: Yes     Comment: 2 drinks monthly      Drug use: No     Sexual activity: Yes     Partners: Male   Other Topics Concern     Parent/sibling w/ CABG, MI or angioplasty before 65F 55M? No      Service No     Blood Transfusions No     Caffeine Concern No     Occupational Exposure No     Hobby Hazards No     Sleep Concern No     Stress Concern No     Weight Concern No     Special Diet No     Back Care No     Exercise Yes     Comment: 3 times per week     Bike Helmet No     Seat Belt Yes     Self-Exams Yes   Social History Narrative     None       Objective:   LMP 04/15/2010   Constitutional: Pleasant no acute cardiopulmonary distress.   EYES: anicteric, normal extra-ocular movements.   Neurological: Alert and oriented.     Psychological: appropriate mood and affect     In House Labs:       TSH   Date Value Ref Range Status   07/30/2020 1.20 0.40 - 4.00 mU/L Final   11/05/2018 1.39 0.40 - 4.00 mU/L Final   07/24/2014 1.03 0.4 - 5.0 mU/L Final       Creatinine   Date Value Ref Range Status   07/30/2020 0.70 0.52 - 1.04 mg/dL Final   ]    Assessment/Treatment Plan:      Osteoporosis without current fracture -Z score and T-scores for lumbar spines involving L3-L4 -3.6 and -3.2 respectively.  Compared to previous study there is -15.6 deterioration in the spine bone density and similarly at the hip -9.1.  risk factor is early or premature menopause 10 years ago.  Plan    To identify additional risk factors we will do blood work for parathyroid hormone, screening for celiac disease and other routine labs.  Vitamin D has been checked previously and within acceptable range.        Adequate calcium and vitamin D discussed about calcium 1200 mg daily from all sources and vitamin D 1000 IUs daily.  In addition to the multivitamin she is taking we are prescribing calcium Wednesday calcium citrate.      Discussed strengthening exercises like walking and other strengthening exercises.      Given the significant drop in bone density and Z score and T-scores in the range of -3.6 and -3.2 respectively at the spine L3-L4; would be reasonable to consider bone specific therapy.  Discussed about Fosamax.  Patient agreeable to start this.  Discussed risk factors of bisphosphonates including osteonecrosis of the jaw and atypical fracture of the hip.      Hormonal replacement therapy is not first-line therapy for osteoporosis management.  It has a role in prevention of osteoporosis when she went to menopause 10 years ago however per guideline right now first-line therapy is for specific therapy as described above.  This was discussed with the patient in detail who verbalized understanding.      Multiple thyroid nodules: Dominant right-sided thyroid nodule  measuring 3.5 cm; was biopsied previously and it was benign.  Follow up ultrasound findings were discussed with the patient.  There is slight interval increase over the last two years. Repeat ultrasound in one year.       Patient Instructions     Anuradha,     Here are the blood work orders placed today. Please call the lab at the number provided to schedule an appointment.   Orders Placed This Encounter   Procedures     Albumin level     Alkaline phosphatase     Calcium     Phosphorus     Parathyroid Hormone Intact     Creatinine     Urea nitrogen     Tissue transglutaminase dominga IgA and IgG     General measures for osteoporosis management      Weight bearing Exercises; walking     Fall prevention    Adequate Calcium and vitamin D intake: for maintenance calcium 1200 mg daily and vitamin D 1000 IU daily from all sources.      Take alendronate 70 mg every week by mouth every 7 days. Take 60 minutes before am meal with 8 oz. water. Remain upright for 30 minutes.       Here is additional information on alendronate     Patient Education     Alendronate tablets  Brand Name: Fosamax  What is this medicine?  ALENDRONATE (a BELÉN droe ketan) slows calcium loss from bones. It helps to make normal healthy bone and to slow bone loss in people with Paget's disease and osteoporosis. It may be used in others at risk for bone loss.  How should I use this medicine?  You must take this medicine exactly as directed or you will lower the amount of the medicine you absorb into your body or you may cause yourself harm. Take this medicine by mouth first thing in the morning, after you are up for the day. Do not eat or drink anything before you take your medicine. Swallow the tablet with a full glass (6 to 8 fluid ounces) of plain water. Do not take this medicine with any other drink. Do not chew or crush the tablet. After taking this medicine, do not eat breakfast, drink, or take any medicines or vitamins for at least 30 minutes. Sit or stand  up for at least 30 minutes after you take this medicine; do not lie down. Do not take your medicine more often than directed.  Talk to your pediatrician regarding the use of this medicine in children. Special care may be needed.  What side effects may I notice from receiving this medicine?  Side effects that you should report to your doctor or health care professional as soon as possible:    allergic reactions like skin rash, itching or hives, swelling of the face, lips, or tongue    black or tarry stools    bone, muscle or joint pain    changes in vision    chest pain    heartburn or stomach pain    jaw pain, especially after dental work    pain or trouble when swallowing    redness, blistering, peeling or loosening of the skin, including inside the mouth  Side effects that usually do not require medical attention (report to your doctor or health care professional if they continue or are bothersome):    changes in taste    diarrhea or constipation    eye pain or itching    headache    nausea or vomiting    stomach gas or fullness  What may interact with this medicine?    aluminum hydroxide    antacids    aspirin    calcium supplements    drugs for inflammation like ibuprofen, naproxen, and others    iron supplements    magnesium supplements    vitamins with minerals    What if I miss a dose?  If you miss a dose, do not take it later in the day. Continue your normal schedule starting the next morning. Do not take double or extra doses.  Where should I keep my medicine?  Keep out of the reach of children.  Store at room temperature of 15 and 30 degrees C (59 and 86 degrees F). Throw away any unused medicine after the expiration date.  What should I tell my health care provider before I take this medicine?  They need to know if you have any of these conditions:    dental disease    esophagus, stomach, or intestine problems, like acid reflux or GERD    kidney disease    low blood calcium    low vitamin D    problems  sitting or standing 30 minutes    trouble swallowing    an unusual or allergic reaction to alendronate, other medicines, foods, dyes, or preservatives    pregnant or trying to get pregnant    breast-feeding  What should I watch for while using this medicine?  Visit your doctor or health care professional for regular checks ups. It may be some time before you see benefit from this medicine. Do not stop taking your medicine except on your doctor's advice. Your doctor or health care professional may order blood tests and other tests to see how you are doing.  You should make sure you get enough calcium and vitamin D while you are taking this medicine, unless your doctor tells you not to. Discuss the foods you eat and the vitamins you take with your health care professional.  Some people who take this medicine have severe bone, joint, and/or muscle pain. This medicine may also increase your risk for a broken thigh bone. Tell your doctor right away if you have pain in your upper leg or groin. Tell your doctor if you have any pain that does not go away or that gets worse.  This medicine can make you more sensitive to the sun. If you get a rash while taking this medicine, sunlight may cause the rash to get worse. Keep out of the sun. If you cannot avoid being in the sun, wear protective clothing and use sunscreen. Do not use sun lamps or tanning beds/booths.  NOTE:This sheet is a summary. It may not cover all possible information. If you have questions about this medicine, talk to your doctor, pharmacist, or health care provider. Copyright  2019 [employer]               I will contact the patient with the test results.  Return to clinic in 12 months.    Test and/or medications prescribed today:  Orders Placed This Encounter   Procedures     Albumin level     Alkaline phosphatase     Calcium     Phosphorus     Parathyroid Hormone Intact     Creatinine     Urea nitrogen     Tissue transglutaminase dominga IgA and IgG          David Barnard MD  Staff Endocrinologist    [phone]  Division of Endocrinology and Diabetes          Video-Visit Details    Type of service:  Video Visit  All times are in your local time  1st VideoStart: 09/22/2020 08:05 am   Stop: 09/22/2020 08:33 am      Originating Location (pt. Location): Home    Distant Location (provider location):  [facility]     Platform used for Video Visit: [facility]            Again, thank you for allowing me to participate in the care of your patient.        Sincerely,        David Barnard MD

## 2020-09-22 NOTE — PROGRESS NOTES
"Anuradha Horn is a 50 year old female who is being evaluated via a billable video visit.      The patient has been notified of following:     \"This video visit will be conducted via a call between you and your physician/provider. We have found that certain health care needs can be provided without the need for an in-person physical exam.  This service lets us provide the care you need with a video conversation.  If a prescription is necessary we can send it directly to your pharmacy.  If lab work is needed we can place an order for that and you can then stop by our lab to have the test done at a later time.    Video visits are billed at different rates depending on your insurance coverage.  Please reach out to your insurance provider with any questions.    If during the course of the call the physician/provider feels a video visit is not appropriate, you will not be charged for this service.\"    Patient has given verbal consent for Video visit? Yes  How would you like to obtain your AVS? MyChart  If you are dropped from the video visit, the video invite should be resent to: Other e-mail: my chart  Will anyone else be joining your video visit? No     CEndocrinology Video Visit    Chief Complaint: Endocrine Problem (follow up)     Information obtained from:Patient    Subjective:         HPI: Anuradha Horn is a 50 year old female here for osteoporosis.     Menopause was at the age of 41; because of early menopause she underwent a DEXA scan study 6 years ago.  The Z scores were above -2. Follow up study showed worsening bone density with -15.4 change compared to the previous at the spine and -9.1 at hip.  Details are copied below.  The main risk factor is again menopause 10 years ago due to premature ovarian failure.  She has not been treated with hormone replacement therapy.  No history of fractures.  Denies loss of loss of height.  No family history of osteoporosis.  No history of chronic medical " conditions known to predispose for osteoporosis including diabetes, rheumatoid arthritis.  She does not take steroids currently or no past history of steroid intake.  She does not take calcium or vitamin D on regular basis however she has a multivitamin she takes once in a while.  No regular exercise.            Right Lobe:  Nodule 1:  Location: Mid right lobe  Size: 1.2 x 1 x 0.5 cm, previously 0.8 x 0.3 x 0.7 cm  Composition: Solid or almost completely solid (2 points)  Echogenicity: Hypoechoic (2 points)  Shape: Wider than tall (0 points)  Margin: Smooth (0 points)  Echogenic Foci: None or large comet tail artifact (0 points)  Stability: Slight increase in size  TIRADS: TR4 (4-6 points)      Nodule 2:  Location: Right mid lobe  Size: 4 x 3.8 x 2 cm, previously 3.5 x 1.8 x 3.6 cm  Composition: Solid or almost completely solid (2 points)  Echogenicity: Hypoechoic (2 points)  Shape: Wider than tall (0 points)  Margin: Smooth (0 points)  Echogenic Foci: None or large comet tail artifact (0 points)  Stability: Minimal increase  TIRADS: TR4 (4-6 points)   FNAC  11/14/2018 suggestive of benign etiology.     Isthmus:  Location: Isthmus   Size: 1.1 x 0.7 x 0.4 cm, previously 0.9 x 0.6 x 0.3 cm  Composition: Solid or almost completely solid (2 points)  Echogenicity: Hypoechoic (2 points)  Shape: Wider than tall (0 points)  Margin: Smooth (0 points)  Echogenic Foci: None or large comet tail artifact (0 points)  Stability:  Minimal increase    TIRADS: TR4 (4-6 points)      Left Lobe:     Nodule 1:  Location: Mid left lobe  Size: 0.7 x 0.6 x 0.4 cm, previously 0.4 x 0.3 x 0.5 cm  Composition: Solid or almost completely solid (2 points)  Echogenicity: Very hypoechoic (3 points)  Shape: Wider than tall (0 points)  Margin: Ill-defined (0 points)  Echogenic Foci: None or large comet tail artifact (0 points)  Stability: Slight increase in size  TIRADS: TR4 (4-6 points)         Nodule 2:  Location: Inferior left lobe  Size: 0.6 x  0.7 x 0.4 cm, previously 0.6 x 0.4 x 0.6 cm  Composition: Solid or almost completely solid (2 points)  Echogenicity: Hyperechoic or isoechoic (1 point)  Shape: Wider than tall (0 points)  Margin: Smooth (0 points)  Echogenic Foci: Peripheral/rim calcifications (2 points)  Stability: No significant change in size  TIRADS: TR4 (4-6 points)        Thyroid, right nodule #2, ultrasound guided fine needle aspiration        Thyroid, right nodule #2, ultrasound guided fine needle aspiration:     Benign   Consistent with a benign nodule (includes adenomatoid nodule, colloid   nodule, etc.)    No family history of thyroid cancer or radiation treatment as a child.  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     No fractures.      No Known Allergies    Current Outpatient Medications   Medication Sig Dispense Refill     alendronate (FOSAMAX) 70 MG tablet Take 1 tablet (70 mg) by mouth every 7 days Take 60 minutes before am meal with 8 oz. water. Remain upright for 30 minutes. 12 tablet 3     Ascorbic Acid (VITAMIN C PO)        calcium citrate-vitamin D (CITRACAL W/D) 250-100 MG-UNIT tablet Take 2 tablets by mouth 2 times daily 180 tablet 3     estradiol (ESTRACE VAGINAL) 0.1 MG/GM vaginal cream 1 gm PV Q HS x 2 weeks, then 2-3 x's weekly thereafter 42.5 g 3     Multiple Vitamins-Minerals (DAILY MULTI PO) Take 1 capsule by mouth daily         Review of Systems     8 point review system (Constitutional, HENT, Eyes, Respiratory, Cardiovascular, Gastrointestinal, Genitourinary, Musculoskeletal,Neurological, Psychiatric/Behavioural, Endocrine) is negative or is as per HPI above    Past Medical History:   Diagnosis Date     Infertility female      Normal delivery 05       Past Surgical History:   Procedure Laterality Date     D & C      following artifiscial insemination     SURGICAL HISTORY OF -   age 14    right hip slipped epiphysis       Family History   Problem Relation Age of Onset     Hypertension Father       Thyroid Disease Mother      Hypertension Mother      Breast Cancer Mother 75     Hypertension Paternal Grandmother      Asthma No family hx of      C.A.D. No family hx of      Diabetes No family hx of      Cerebrovascular Disease No family hx of      Cancer - colorectal No family hx of      Prostate Cancer No family hx of        Social History     Socioeconomic History     Marital status:      Spouse name: None     Number of children: 2     Years of education: None     Highest education level: None   Social Needs     Financial resource strain: None     Food insecurity - worry: None     Food insecurity - inability: None     Transportation needs - medical: None     Transportation needs - non-medical: None   Occupational History     Occupation: dentist     Employer: [employer]   Tobacco Use     Smoking status: Never Smoker     Smokeless tobacco: Never Used   Substance and Sexual Activity     Alcohol use: Yes     Comment: 2 drinks monthly      Drug use: No     Sexual activity: Yes     Partners: Male   Other Topics Concern     Parent/sibling w/ CABG, MI or angioplasty before 65F 55M? No      Service No     Blood Transfusions No     Caffeine Concern No     Occupational Exposure No     Hobby Hazards No     Sleep Concern No     Stress Concern No     Weight Concern No     Special Diet No     Back Care No     Exercise Yes     Comment: 3 times per week     Bike Helmet No     Seat Belt Yes     Self-Exams Yes   Social History Narrative     None       Objective:   LMP 04/15/2010   Constitutional: Pleasant no acute cardiopulmonary distress.   EYES: anicteric, normal extra-ocular movements.   Neurological: Alert and oriented.    Psychological: appropriate mood and affect     In House Labs:       TSH   Date Value Ref Range Status   07/30/2020 1.20 0.40 - 4.00 mU/L Final   11/05/2018 1.39 0.40 - 4.00 mU/L Final   07/24/2014 1.03 0.4 - 5.0 mU/L Final       Creatinine   Date Value Ref Range Status    07/30/2020 0.70 0.52 - 1.04 mg/dL Final   ]    Assessment/Treatment Plan:      Osteoporosis without current fracture -Z score and T-scores for lumbar spines involving L3-L4 -3.6 and -3.2 respectively.  Compared to previous study there is -15.6 deterioration in the spine bone density and similarly at the hip -9.1.  risk factor is early or premature menopause 10 years ago.  Plan    To identify additional risk factors we will do blood work for parathyroid hormone, screening for celiac disease and other routine labs.  Vitamin D has been checked previously and within acceptable range.        Adequate calcium and vitamin D discussed about calcium 1200 mg daily from all sources and vitamin D 1000 IUs daily.  In addition to the multivitamin she is taking we are prescribing calcium Wednesday calcium citrate.      Discussed strengthening exercises like walking and other strengthening exercises.      Given the significant drop in bone density and Z score and T-scores in the range of -3.6 and -3.2 respectively at the spine L3-L4; would be reasonable to consider bone specific therapy.  Discussed about Fosamax.  Patient agreeable to start this.  Discussed risk factors of bisphosphonates including osteonecrosis of the jaw and atypical fracture of the hip.      Hormonal replacement therapy is not first-line therapy for osteoporosis management.  It has a role in prevention of osteoporosis when she went to menopause 10 years ago however per guideline right now first-line therapy is for specific therapy as described above.  This was discussed with the patient in detail who verbalized understanding.      Multiple thyroid nodules: Dominant right-sided thyroid nodule measuring 3.5 cm; was biopsied previously and it was benign.  Follow up ultrasound findings were discussed with the patient.  There is slight interval increase over the last two years. Repeat ultrasound in one year.       Patient Instructions     Anuradha,     Sherice are the  blood work orders placed today. Please call the lab at the number provided to schedule an appointment.   Orders Placed This Encounter   Procedures     Albumin level     Alkaline phosphatase     Calcium     Phosphorus     Parathyroid Hormone Intact     Creatinine     Urea nitrogen     Tissue transglutaminase dominga IgA and IgG     General measures for osteoporosis management      Weight bearing Exercises; walking     Fall prevention    Adequate Calcium and vitamin D intake: for maintenance calcium 1200 mg daily and vitamin D 1000 IU daily from all sources.      Take alendronate 70 mg every week by mouth every 7 days. Take 60 minutes before am meal with 8 oz. water. Remain upright for 30 minutes.       Here is additional information on alendronate     Patient Education     Alendronate tablets  Brand Name: Fosamax  What is this medicine?  ALENDRONATE (a BELÉN droe ketan) slows calcium loss from bones. It helps to make normal healthy bone and to slow bone loss in people with Paget's disease and osteoporosis. It may be used in others at risk for bone loss.  How should I use this medicine?  You must take this medicine exactly as directed or you will lower the amount of the medicine you absorb into your body or you may cause yourself harm. Take this medicine by mouth first thing in the morning, after you are up for the day. Do not eat or drink anything before you take your medicine. Swallow the tablet with a full glass (6 to 8 fluid ounces) of plain water. Do not take this medicine with any other drink. Do not chew or crush the tablet. After taking this medicine, do not eat breakfast, drink, or take any medicines or vitamins for at least 30 minutes. Sit or stand up for at least 30 minutes after you take this medicine; do not lie down. Do not take your medicine more often than directed.  Talk to your pediatrician regarding the use of this medicine in children. Special care may be needed.  What side effects may I notice from  receiving this medicine?  Side effects that you should report to your doctor or health care professional as soon as possible:    allergic reactions like skin rash, itching or hives, swelling of the face, lips, or tongue    black or tarry stools    bone, muscle or joint pain    changes in vision    chest pain    heartburn or stomach pain    jaw pain, especially after dental work    pain or trouble when swallowing    redness, blistering, peeling or loosening of the skin, including inside the mouth  Side effects that usually do not require medical attention (report to your doctor or health care professional if they continue or are bothersome):    changes in taste    diarrhea or constipation    eye pain or itching    headache    nausea or vomiting    stomach gas or fullness  What may interact with this medicine?    aluminum hydroxide    antacids    aspirin    calcium supplements    drugs for inflammation like ibuprofen, naproxen, and others    iron supplements    magnesium supplements    vitamins with minerals    What if I miss a dose?  If you miss a dose, do not take it later in the day. Continue your normal schedule starting the next morning. Do not take double or extra doses.  Where should I keep my medicine?  Keep out of the reach of children.  Store at room temperature of 15 and 30 degrees C (59 and 86 degrees F). Throw away any unused medicine after the expiration date.  What should I tell my health care provider before I take this medicine?  They need to know if you have any of these conditions:    dental disease    esophagus, stomach, or intestine problems, like acid reflux or GERD    kidney disease    low blood calcium    low vitamin D    problems sitting or standing 30 minutes    trouble swallowing    an unusual or allergic reaction to alendronate, other medicines, foods, dyes, or preservatives    pregnant or trying to get pregnant    breast-feeding  What should I watch for while using this medicine?  Visit  your doctor or health care professional for regular checks ups. It may be some time before you see benefit from this medicine. Do not stop taking your medicine except on your doctor's advice. Your doctor or health care professional may order blood tests and other tests to see how you are doing.  You should make sure you get enough calcium and vitamin D while you are taking this medicine, unless your doctor tells you not to. Discuss the foods you eat and the vitamins you take with your health care professional.  Some people who take this medicine have severe bone, joint, and/or muscle pain. This medicine may also increase your risk for a broken thigh bone. Tell your doctor right away if you have pain in your upper leg or groin. Tell your doctor if you have any pain that does not go away or that gets worse.  This medicine can make you more sensitive to the sun. If you get a rash while taking this medicine, sunlight may cause the rash to get worse. Keep out of the sun. If you cannot avoid being in the sun, wear protective clothing and use sunscreen. Do not use sun lamps or tanning beds/booths.  NOTE:This sheet is a summary. It may not cover all possible information. If you have questions about this medicine, talk to your doctor, pharmacist, or health care provider. Copyright  2019 [employer]               I will contact the patient with the test results.  Return to clinic in 12 months.    Test and/or medications prescribed today:  Orders Placed This Encounter   Procedures     Albumin level     Alkaline phosphatase     Calcium     Phosphorus     Parathyroid Hormone Intact     Creatinine     Urea nitrogen     Tissue transglutaminase dominga IgA and IgG         David Barnard MD  Staff Endocrinologist    [phone]  Division of Endocrinology and Diabetes          Video-Visit Details    Type of service:  Video Visit  All times are in your local time  1st VideoStart: 09/22/2020 08:05 am   Stop: 09/22/2020 08:33  arjun      Originating Location (pt. Location): Home    Distant Location (provider location):  [facility] [[email or web address]]     Platform used for Video Visit: Ness

## 2020-09-24 DIAGNOSIS — M81.0 OSTEOPOROSIS WITHOUT CURRENT PATHOLOGICAL FRACTURE, UNSPECIFIED OSTEOPOROSIS TYPE: ICD-10-CM

## 2020-09-24 LAB
ALBUMIN SERPL-MCNC: 4 G/DL (ref 3.4–5)
ALP SERPL-CCNC: 78 U/L (ref 40–150)
BUN SERPL-MCNC: 17 MG/DL (ref 7–30)
CALCIUM SERPL-MCNC: 9.3 MG/DL (ref 8.5–10.1)
CREAT SERPL-MCNC: 0.69 MG/DL (ref 0.52–1.04)
GFR SERPL CREATININE-BSD FRML MDRD: >90 ML/MIN/{1.73_M2}
PHOSPHATE SERPL-MCNC: 3.4 MG/DL (ref 2.5–4.5)
PTH-INTACT SERPL-MCNC: 36 PG/ML (ref 18–80)

## 2020-09-24 PROCEDURE — 84100 ASSAY OF PHOSPHORUS: CPT | Performed by: INTERNAL MEDICINE

## 2020-09-24 PROCEDURE — 83516 IMMUNOASSAY NONANTIBODY: CPT | Performed by: INTERNAL MEDICINE

## 2020-09-24 PROCEDURE — 82040 ASSAY OF SERUM ALBUMIN: CPT | Performed by: INTERNAL MEDICINE

## 2020-09-24 PROCEDURE — 84520 ASSAY OF UREA NITROGEN: CPT | Performed by: INTERNAL MEDICINE

## 2020-09-24 PROCEDURE — 82310 ASSAY OF CALCIUM: CPT | Performed by: INTERNAL MEDICINE

## 2020-09-24 PROCEDURE — 83970 ASSAY OF PARATHORMONE: CPT | Performed by: INTERNAL MEDICINE

## 2020-09-24 PROCEDURE — 83516 IMMUNOASSAY NONANTIBODY: CPT | Mod: 59 | Performed by: INTERNAL MEDICINE

## 2020-09-24 PROCEDURE — 36415 COLL VENOUS BLD VENIPUNCTURE: CPT | Performed by: INTERNAL MEDICINE

## 2020-09-24 PROCEDURE — 82565 ASSAY OF CREATININE: CPT | Performed by: INTERNAL MEDICINE

## 2020-09-24 PROCEDURE — 84075 ASSAY ALKALINE PHOSPHATASE: CPT | Performed by: INTERNAL MEDICINE

## 2020-09-25 LAB
TTG IGA SER-ACNC: <1 U/ML
TTG IGG SER-ACNC: <1 U/ML

## 2020-09-28 NOTE — RESULT ENCOUNTER NOTE
Anuradha,  Attached please find blood work results done at endocrinology clinic.  1.  Screening test for celiac disease is within the normal limits or negative.  2.  Parathyroid hormone, calcium and other related labs are stable.    Please proceed with oral bisphosphonate therapy-Fosamax as previously discussed.  Please let me know if you have any questions.    David Barnard MD

## 2021-01-26 ENCOUNTER — IMMUNIZATION (OUTPATIENT)
Dept: PEDIATRICS | Facility: CLINIC | Age: 51
End: 2021-01-26
Payer: COMMERCIAL

## 2021-01-26 PROCEDURE — 0001A PR COVID VAC PFIZER DIL RECON 30 MCG/0.3 ML IM: CPT

## 2021-01-26 PROCEDURE — 91300 PR COVID VAC PFIZER DIL RECON 30 MCG/0.3 ML IM: CPT

## 2021-02-16 ENCOUNTER — IMMUNIZATION (OUTPATIENT)
Dept: PEDIATRICS | Facility: CLINIC | Age: 51
End: 2021-02-16
Attending: INTERNAL MEDICINE
Payer: COMMERCIAL

## 2021-02-16 PROCEDURE — 91300 PR COVID VAC PFIZER DIL RECON 30 MCG/0.3 ML IM: CPT

## 2021-02-16 PROCEDURE — 0002A PR COVID VAC PFIZER DIL RECON 30 MCG/0.3 ML IM: CPT

## 2021-04-04 ENCOUNTER — HEALTH MAINTENANCE LETTER (OUTPATIENT)
Age: 51
End: 2021-04-04

## 2021-04-13 DIAGNOSIS — Z12.31 VISIT FOR SCREENING MAMMOGRAM: ICD-10-CM

## 2021-04-13 PROCEDURE — 77063 BREAST TOMOSYNTHESIS BI: CPT

## 2021-04-13 PROCEDURE — 77067 SCR MAMMO BI INCL CAD: CPT

## 2021-05-21 ENCOUNTER — OFFICE VISIT (OUTPATIENT)
Dept: FAMILY MEDICINE | Facility: CLINIC | Age: 51
End: 2021-05-21
Payer: COMMERCIAL

## 2021-05-21 VITALS — SYSTOLIC BLOOD PRESSURE: 104 MMHG | DIASTOLIC BLOOD PRESSURE: 58 MMHG

## 2021-05-21 DIAGNOSIS — D48.5 NEOPLASM OF UNCERTAIN BEHAVIOR OF SKIN: ICD-10-CM

## 2021-05-21 DIAGNOSIS — Q89.9 LYMPHATIC MALFORMATION: ICD-10-CM

## 2021-05-21 DIAGNOSIS — D22.9 MULTIPLE BENIGN NEVI: Primary | ICD-10-CM

## 2021-05-21 DIAGNOSIS — D18.01 CHERRY ANGIOMA: ICD-10-CM

## 2021-05-21 DIAGNOSIS — L82.1 SEBORRHEIC KERATOSES: ICD-10-CM

## 2021-05-21 DIAGNOSIS — L81.4 LENTIGINES: ICD-10-CM

## 2021-05-21 PROCEDURE — 88305 TISSUE EXAM BY PATHOLOGIST: CPT | Performed by: DERMATOLOGY

## 2021-05-21 PROCEDURE — 11103 TANGNTL BX SKIN EA SEP/ADDL: CPT | Performed by: PHYSICIAN ASSISTANT

## 2021-05-21 PROCEDURE — 11102 TANGNTL BX SKIN SINGLE LES: CPT | Performed by: PHYSICIAN ASSISTANT

## 2021-05-21 PROCEDURE — 99214 OFFICE O/P EST MOD 30 MIN: CPT | Mod: 25 | Performed by: PHYSICIAN ASSISTANT

## 2021-05-21 NOTE — LETTER
5/21/2021         RE: Anuradha Horn  [id number] [address] [address]        Dear Colleague,    Thank you for referring your patient, Anuradha Horn, to the [facility]. Please see a copy of my visit note below.    HPI:  Anuradha Horn is a 50 year old female patient here today for FBE . Has a bothersome spot on left abdomen  Patient states this has been present for a while.  Patient reports the following symptoms: bothersome, growing, irritated .  Patient reports the following previous treatments: none.  Patient reports the following modifying factors: none.  Associated symptoms: none.  Patient has no other skin complaints today.  Remainder of the HPI, Meds, PMH, Allergies, FH, and SH was reviewed in chart.    Pertinent Hx:     Father had skin cancer and mother has precancers. No personal history of skin cancer.   Past Medical History:   Diagnosis Date     Infertility female      Normal delivery 05       Past Surgical History:   Procedure Laterality Date     D & C      following artifiscial insemination     SURGICAL HISTORY OF -   age 14    right hip slipped epiphysis        Family History   Problem Relation Age of Onset     Hypertension Father      Thyroid Disease Mother      Hypertension Mother      Breast Cancer Mother 75     Hypertension Paternal Grandmother      Asthma No family hx of      C.A.D. No family hx of      Diabetes No family hx of      Cerebrovascular Disease No family hx of      Cancer - colorectal No family hx of      Prostate Cancer No family hx of        Social History     Socioeconomic History     Marital status:      Spouse name: Not on file     Number of children: 2     Years of education: Not on file     Highest education level: Not on file   Occupational History     Occupation: dentist     Employer: [employer]   Social Needs     Financial resource strain: Not on file     Food insecurity     Worry: Not  on file     Inability: Not on file     Transportation needs     Medical: Not on file     Non-medical: Not on file   Tobacco Use     Smoking status: Never Smoker     Smokeless tobacco: Never Used   Substance and Sexual Activity     Alcohol use: Yes     Comment: 2 drinks monthly      Drug use: No     Sexual activity: Yes     Partners: Male   Lifestyle     Physical activity     Days per week: Not on file     Minutes per session: Not on file     Stress: Not on file   Relationships     Social connections     Talks on phone: Not on file     Gets together: Not on file     Attends L[employer]-day service: Not on file     Active member of club or organization: Not on file     Attends meetings of clubs or organizations: Not on file     Relationship status: Not on file     Intimate partner violence     Fear of current or ex partner: Not on file     Emotionally abused: Not on file     Physically abused: Not on file     Forced sexual activity: Not on file   Other Topics Concern     Parent/sibling w/ CABG, MI or angioplasty before 65F 55M? No      Service No     Blood Transfusions No     Caffeine Concern No     Occupational Exposure No     Hobby Hazards No     Sleep Concern No     Stress Concern No     Weight Concern No     Special Diet No     Back Care No     Exercise Yes     Comment: 3 times per week     Bike Helmet No     Seat Belt Yes     Self-Exams Yes   Social History Narrative     Not on file       Outpatient Encounter Medications as of 5/21/2021   Medication Sig Dispense Refill     alendronate (FOSAMAX) 70 MG tablet Take 1 tablet (70 mg) by mouth every 7 days Take 60 minutes before am meal with 8 oz. water. Remain upright for 30 minutes. 12 tablet 3     Ascorbic Acid (VITAMIN C PO)        calcium citrate-vitamin D (CITRACAL W/D) 250-100 MG-UNIT tablet Take 2 tablets by mouth 2 times daily 180 tablet 3     estradiol (ESTRACE VAGINAL) 0.1 MG/GM vaginal cream 1 gm PV Q HS x 2 weeks, then 2-3 x's weekly thereafter 42.5 g 3      Multiple Vitamins-Minerals (DAILY MULTI PO) Take 1 capsule by mouth daily       No facility-administered encounter medications on file as of 5/21/2021.        Review Of Systems:  Skin: spots  Eyes: negative  Ears/Nose/Throat: negative  Respiratory: No shortness of breath, dyspnea on exertion, cough, or hemoptysis  Cardiovascular: negative  Gastrointestinal: negative  Genitourinary: negative  Musculoskeletal: negative  Neurologic: negative  Psychiatric: negative  Hematologic/Lymphatic/Immunologic: negative  Endocrine: negative      Objective:     /58   LMP 04/15/2010   Eyes: Conjunctivae/lids: Normal   ENT: Lips:  Normal  MSK: Normal  Cardiovascular: Peripheral edema none  Pulm: Breathing Normal  Neuro/Psych: Orientation: A/O x 3. Normal; Mood/Affect: Normal, NAD, WDWN  Pt accompanied by: self  Following areas examined: Scalp, face, eyelids, lips, neck, chest, abdomen, back, and R&L upper and lower extremities, buttock, hips. Pt defers exam of groin and genitals.   [place] skin type:i   Findings:  Red smooth well-defined macules on trunk and extremities.  Brown, stuck-on scaly appearing papules on trunk and extremities.  Well circumscribed macules with symmetric color distribution on right plantar foot, trunk and extremities.  Tan WD smooth macules on face, neck, trunk, and extremities.  Red smooth annular papule on right flank 0.4cm  Purple smooth macule on right anterolateral thigh 0.3cm  Inflamed flesh-colored smooth pedunculated papule on Left upper quadrant 0.2cm     Assessment and Plan:     1) Cherry angiomas, Seborrheic keratoses, Benign nevi, Lentigines     I discussed the specifics of tumor, prognosis, and genetics of benign lesions.  I explained that treatment of these lesions would be purely cosmetic and not medically neccessary.  I discussed with patient different removal options including excision, cryotherapy, cautery and /or laser.  Lesion may recur and/or may not completely resolve. May  need additional treatment.     2) bx proven hemangioma and Lymphangioma circumscriptum  Watch and monitor  Disc benign etiology   Reviewed pathology  A. Skin, left inframammary fold, shave:   - Hemangioma - (see description)     B. 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  No complications and routine wound care.  May grow back and will get a scar. Based on lesion type may need to completely remove lesion. Patient will be notified in 7-10 days of results. Wound care directions given.  Base of lesion was dried with pressure and gauze and electrocautery for 2-3 s for 1 cycle. Warned risks of blistering, pain, pigment change, scarring, and incomplete resolution.  Advised patient to return if lesions do not completely resolve within 2-3 months.  Wound care sheet given.    4) Neoplasm of uncertain behavior on right anterolateral thigh 0.3cm  Rule out hemangioma  TANGENTIAL BIOPSY:  After consent, anesthesia with LEC and prep, tangential biopsy performed.  No complications and routine wound care.  May grow back and will get a scar. Based on lesion type may need to completely remove lesion. Patient will be notified in 7-10 days of results. Wound care directions given.  Base of lesion was dried with pressure and gauze and electrocautery for 2-3 s for 1 cycle. Warned risks of blistering, pain, pigment change, scarring, and incomplete resolution.  Advised patient to return if lesions do not completely resolve within 2-3 months.  Wound care sheet given.    5) Neoplasm of uncertain behavior left upper quadrant 0.2cm  Benign nevus vs acrochordon  TANGENTIAL BIOPSY:  After consent, anesthesia with LEC and prep, tangential biopsy performed.  No complications and routine wound  care.  May grow back and will get a scar. Based on lesion type may need to completely remove lesion. Patient will be notified in 7-10 days of results. Wound care directions given.        Signs and Symptoms of non-melanoma skin cancer and ABCDEs of melanoma reviewed with patient. Patient encouraged to perform monthly self skin exams and educated on how to perform them. UV precautions reviewed with patient. Patient was asked about new or changing moles/lesions on body.   Wear a sunscreen with at least SPF 30 on your face, ears, neck and V of the chest daily. Wear sunscreen on other areas of the body if those areas are exposed to the sun throughout the day. Sunscreens can contain physical and/or chemical blockers. Physical blockers are less likely to clog pores, these include zinc oxide and titanium dioxide. Reapply every two hour and after swimming. Sunscreen examples include Neutrogena, CeraVe, Blue Lizard, Elta MD and many others.    Proper skin care from [facility]:    -Eliminate harsh soaps as they strip the natural oils from the skin, often resulting in dry itchy skin ( i.e. Dial, Zest, [place])  -Use mild soaps such as Cetaphil or Dove Sensitive Skin in the shower. You do not need to use soap on arms, legs, and trunk every time you shower unless visibly soiled.   -Avoid hot or cold showers.  -After showering, lightly dry off and apply moisturizing within 2-3 minutes. This will help trap moisture in the skin.   -Aggressive use of a moisturizer at least 1-2 times a day to the entire body (including -Vanicream, Cetaphil, Aquaphor or Cerave) and moisturize hands after every washing.  -We recommend using moisturizers that come in a tub that needs to be scooped out, not a pump. This has more of an oil base. It will hold moisture in your skin much better than a water base moisturizer. The above recommended are non-pore clogging.         It was a pleasure speaking with Anuradha Horn today.        Follow up in 2-3 years fbe        Again, thank you for allowing me to participate in the care of your patient.        Sincerely,        Madelyn Angel PA-C

## 2021-05-21 NOTE — PATIENT INSTRUCTIONS
Wound Care Instructions     FOR SUPERFICIAL WOUNDS     [facility] [phone]    [facility] [phone]          AFTER 24 HOURS YOU SHOULD REMOVE THE BANDAGE AND BEGIN DAILY DRESSING CHANGES AS FOLLOWS:     1) Remove Dressing.     2) Clean and dry the area with tap water using a Q-tip or sterile gauze pad.     3) Apply Vaseline, Aquaphor, Polysporin ointment or Bacitracin ointment over entire wound.  Do NOT use Neosporin ointment.     4) Cover the wound with a band-aid, or a sterile non-stick gauze pad and micropore paper tape      REPEAT THESE INSTRUCTIONS AT LEAST ONCE A DAY UNTIL THE WOUND HAS COMPLETELY HEALED.    It is an old wives tale that a wound heals better when it is exposed to air and allowed to dry out. The wound will heal faster with a better cosmetic result if it is kept moist with ointment and covered with a bandage.    **Do not let the wound dry out.**      Supplies Needed:      *Cotton tipped applicators (Q-tips)    *Polysporin Ointment or Bacitracin Ointment (NOT NEOSPORIN)    *Band-aids or non-stick gauze pads and micropore paper tape.      PATIENT INFORMATION:    During the healing process you will notice a number of changes. All wounds develop a small halo of redness surrounding the wound.  This means healing is occurring. Severe itching with extensive redness usually indicates sensitivity to the ointment or bandage tape used to dress the wound.  You should call our office if this develops.      Swelling  and/or discoloration around your surgical site is common, particularly when performed around the eye.    All wounds normally drain.  The larger the wound the more drainage there will be.  After 7-10 days, you will notice the wound beginning to shrink and new skin will begin to grow.  The wound is healed when you can see skin has formed over the entire area.  A healed wound has a healthy, shiny look to the surface and is red to dark pink in color to  normalize.  Wounds may take approximately 4-6 weeks to heal.  Larger wounds may take 6-8 weeks.  After the wound is healed you may discontinue dressing changes.    You may experience a sensation of tightness as your wound heals. This is normal and will gradually subside.    Your healed wound may be sensitive to temperature changes. This sensitivity improves with time, but if you re having a lot of discomfort, try to avoid temperature extremes.    Patients frequently experience itching after their wound appears to have healed because of the continue healing under the skin.  Plain Vaseline will help relieve the itching.        POSSIBLE COMPLICATIONS    BLEEDIN. Leave the bandage in place.  2. Use tightly rolled up gauze or a cloth to apply direct pressure over the bandage for 30  minutes.  3. Reapply pressure for an additional 30 minutes if necessary  4. Use additional gauze and tape to maintain pressure once the bleeding has stopped.        Proper skin care from [facility]:    -Eliminate harsh soaps as they strip the natural oils from the skin, often resulting in dry itchy skin ( i.e. Dial, Zest, Pia Spring)  -Use mild soaps such as Cetaphil or Dove Sensitive Skin in the shower. You do not need to use soap on arms, legs, and trunk every time you shower unless visibly soiled.   -Avoid hot or cold showers.  -After showering, lightly dry off and apply moisturizing within 2-3 minutes. This will help trap moisture in the skin.   -Aggressive use of a moisturizer at least 1-2 times a day to the entire body (including -Vanicream, Cetaphil, Aquaphor or Cerave) and moisturize hands after every washing.  -We recommend using moisturizers that come in a tub that needs to be scooped out, not a pump. This has more of an oil base. It will hold moisture in your skin much better than a water base moisturizer. The above recommended are non-pore clogging.      Wear a sunscreen with at least SPF 30 on your face, ears,  neck and V of the chest daily. Wear sunscreen on other areas of the body if those areas are exposed to the sun throughout the day. Sunscreens can contain physical and/or chemical blockers. Physical blockers are less likely to clog pores, these include zinc oxide and titanium dioxide. Reapply every two hour and after swimming. Sunscreen examples include Neutrogena, CeraVe, Blue Lizard, Elta MD and many others.    UV radiation  UVA radiation remains constant throughout the day and throughout the year. It is a longer wavelength than UVB and therefore penetrates deeper into the skin leading to immediate and delayed tanning, photoaging, and skin cancer. 70-80% of UVA and UVB radiation occurs between the hours of 10am-2pm.  UVB radiation  UVB radiation causes the most harmful effects and is more significant during the summer months. However, snow and ice can reflect UVB radiation leading to skin damage during the winter months as well. UVB radiation is responsible for tanning, burning, inflammation, delayed erythema (pinkness), pigmentation (brown spots), and skin cancer.     I recommend self monthly full body exams and yearly full body exams with a dermatology provider. If you develop a new or changing lesion please follow up for examination. Most skin cancers are pink and scaly or pink and pearly. However, we do see blue/brown/black skin cancers.  Consider the ABCDEs of melanoma when giving yourself your monthly full body exam ( don't forget the groin, buttocks, feet, toes, etc). A-asymmetry, B-borders, C-color, D-diameter, E-elevation or evolving. If you see any of these changes please follow up in clinic. If you cannot see your back I recommend purchasing a hand held mirror to use with a larger wall mirror.          [employer]  Consider botox  Vitamin c and e serum  Consider retinols  Consider vaseline around eyelids    Wear a broad spectrum (covers UVA and UVB) sunscreen with at least SPF 30 on your face,  ears, neck and V of the chest daily. Wear sunscreen on other areas of the body if those areas are exposed to the sun throughout the day. Sunscreens can contain physical and/or chemical blockers. Physical blockers are less likely to clog pores, these include zinc oxide and titanium dioxide. Reapply every two hour and after swimming. Sunscreen examples include Neutrogena, CeraVe, Blue Lizard, Elta MD and many others.    UV radiation  UVA radiation remains constant throughout the day and throughout the year. It is a longer wavelength than UVB and therefore penetrates deeper into the skin leading to immediate and delayed tanning, photoaging, and skin cancer. 70-80% of UVA and UVB radiation occurs between the hours of 10am-2pm.  UVB radiation  UVB radiation causes the most harmful effects and is more significant during the summer months. However, snow and ice can reflect UVB radiation leading to skin damage during the winter months as well. UVB radiation is responsible for tanning, burning, inflammation, delayed erythema (pinkness), pigmentation (brown spots), and skin cancer.

## 2021-05-21 NOTE — PROGRESS NOTES
HPI:  Anuradha Horn is a 50 year old female patient here today for FBE . Has a bothersome spot on left abdomen  Patient states this has been present for a while.  Patient reports the following symptoms: bothersome, growing, irritated .  Patient reports the following previous treatments: none.  Patient reports the following modifying factors: none.  Associated symptoms: none.  Patient has no other skin complaints today.  Remainder of the HPI, Meds, PMH, Allergies, FH, and SH was reviewed in chart.    Pertinent Hx:     Father had skin cancer and mother has precancers. No personal history of skin cancer.   Past Medical History:   Diagnosis Date     Infertility female      Normal delivery 05       Past Surgical History:   Procedure Laterality Date     D & C      following artifiscial insemination     SURGICAL HISTORY OF -   age 14    right hip slipped epiphysis        Family History   Problem Relation Age of Onset     Hypertension Father      Thyroid Disease Mother      Hypertension Mother      Breast Cancer Mother 75     Hypertension Paternal Grandmother      Asthma No family hx of      C.A.D. No family hx of      Diabetes No family hx of      Cerebrovascular Disease No family hx of      Cancer - colorectal No family hx of      Prostate Cancer No family hx of        Social History     Socioeconomic History     Marital status:      Spouse name: Not on file     Number of children: 2     Years of education: Not on file     Highest education level: Not on file   Occupational History     Occupation: dentist     Employer: [employer]   Social Needs     Financial resource strain: Not on file     Food insecurity     Worry: Not on file     Inability: Not on file     Transportation needs     Medical: Not on file     Non-medical: Not on file   Tobacco Use     Smoking status: Never Smoker     Smokeless tobacco: Never Used   Substance and Sexual Activity     Alcohol use: Yes     Comment: 2 drinks  monthly      Drug use: No     Sexual activity: Yes     Partners: Male   Lifestyle     Physical activity     Days per week: Not on file     Minutes per session: Not on file     Stress: Not on file   Relationships     Social connections     Talks on phone: Not on file     Gets together: Not on file     Attends Caodaism service: Not on file     Active member of club or organization: Not on file     Attends meetings of clubs or organizations: Not on file     Relationship status: Not on file     Intimate partner violence     Fear of current or ex partner: Not on file     Emotionally abused: Not on file     Physically abused: Not on file     Forced sexual activity: Not on file   Other Topics Concern     Parent/sibling w/ CABG, MI or angioplasty before 65F 55M? No      Service No     Blood Transfusions No     Caffeine Concern No     Occupational Exposure No     Hobby Hazards No     Sleep Concern No     Stress Concern No     Weight Concern No     Special Diet No     Back Care No     Exercise Yes     Comment: 3 times per week     Bike Helmet No     Seat Belt Yes     Self-Exams Yes   Social History Narrative     Not on file       Outpatient Encounter Medications as of 5/21/2021   Medication Sig Dispense Refill     alendronate (FOSAMAX) 70 MG tablet Take 1 tablet (70 mg) by mouth every 7 days Take 60 minutes before am meal with 8 oz. water. Remain upright for 30 minutes. 12 tablet 3     Ascorbic Acid (VITAMIN C PO)        calcium citrate-vitamin D (CITRACAL W/D) 250-100 MG-UNIT tablet Take 2 tablets by mouth 2 times daily 180 tablet 3     estradiol (ESTRACE VAGINAL) 0.1 MG/GM vaginal cream 1 gm PV Q HS x 2 weeks, then 2-3 x's weekly thereafter 42.5 g 3     Multiple Vitamins-Minerals (DAILY MULTI PO) Take 1 capsule by mouth daily       No facility-administered encounter medications on file as of 5/21/2021.        Review Of Systems:  Skin: spots  Eyes: negative  Ears/Nose/Throat: negative  Respiratory: No shortness of  breath, dyspnea on exertion, cough, or hemoptysis  Cardiovascular: negative  Gastrointestinal: negative  Genitourinary: negative  Musculoskeletal: negative  Neurologic: negative  Psychiatric: negative  Hematologic/Lymphatic/Immunologic: negative  Endocrine: negative      Objective:     /58   LMP 04/15/2010   Eyes: Conjunctivae/lids: Normal   ENT: Lips:  Normal  MSK: Normal  Cardiovascular: Peripheral edema none  Pulm: Breathing Normal  Neuro/Psych: Orientation: A/O x 3. Normal; Mood/Affect: Normal, NAD, WDWN  Pt accompanied by: self  Following areas examined: Scalp, face, eyelids, lips, neck, chest, abdomen, back, and R&L upper and lower extremities, buttock, hips. Pt defers exam of groin and genitals.   [place] skin type:i   Findings:  Red smooth well-defined macules on trunk and extremities.  Brown, stuck-on scaly appearing papules on trunk and extremities.  Well circumscribed macules with symmetric color distribution on right plantar foot, trunk and extremities.  Tan WD smooth macules on face, neck, trunk, and extremities.  Red smooth annular papule on right flank 0.4cm  Purple smooth macule on right anterolateral thigh 0.3cm  Inflamed flesh-colored smooth pedunculated papule on Left upper quadrant 0.2cm     Assessment and Plan:     1) Cherry angiomas, Seborrheic keratoses, Benign nevi, Lentigines     I discussed the specifics of tumor, prognosis, and genetics of benign lesions.  I explained that treatment of these lesions would be purely cosmetic and not medically neccessary.  I discussed with patient different removal options including excision, cryotherapy, cautery and /or laser.  Lesion may recur and/or may not completely resolve. May need additional treatment.     2) bx proven hemangioma and Lymphangioma circumscriptum  Watch and monitor  Disc benign etiology   Reviewed pathology  A. Skin, left inframammary fold, shave:   - Hemangioma - (see description)     B. 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  No complications and routine wound care.  May grow back and will get a scar. Based on lesion type may need to completely remove lesion. Patient will be notified in 7-10 days of results. Wound care directions given.  Base of lesion was dried with pressure and gauze and electrocautery for 2-3 s for 1 cycle. Warned risks of blistering, pain, pigment change, scarring, and incomplete resolution.  Advised patient to return if lesions do not completely resolve within 2-3 months.  Wound care sheet given.    4) Neoplasm of uncertain behavior on right anterolateral thigh 0.3cm  Rule out hemangioma  TANGENTIAL BIOPSY:  After consent, anesthesia with LEC and prep, tangential biopsy performed.  No complications and routine wound care.  May grow back and will get a scar. Based on lesion type may need to completely remove lesion. Patient will be notified in 7-10 days of results. Wound care directions given.  Base of lesion was dried with pressure and gauze and electrocautery for 2-3 s for 1 cycle. Warned risks of blistering, pain, pigment change, scarring, and incomplete resolution.  Advised patient to return if lesions do not completely resolve within 2-3 months.  Wound care sheet given.    5) Neoplasm of uncertain behavior left upper quadrant 0.2cm  Benign nevus vs acrochordon  TANGENTIAL BIOPSY:  After consent, anesthesia with LEC and prep, tangential biopsy performed.  No complications and routine wound care.  May grow back and will get a scar. Based on lesion type may need to completely remove lesion. Patient will be notified in 7-10 days of results. Wound care directions given.        Signs and Symptoms of non-melanoma skin cancer and ABCDEs of melanoma reviewed  with patient. Patient encouraged to perform monthly self skin exams and educated on how to perform them. UV precautions reviewed with patient. Patient was asked about new or changing moles/lesions on body.   Wear a sunscreen with at least SPF 30 on your face, ears, neck and V of the chest daily. Wear sunscreen on other areas of the body if those areas are exposed to the sun throughout the day. Sunscreens can contain physical and/or chemical blockers. Physical blockers are less likely to clog pores, these include zinc oxide and titanium dioxide. Reapply every two hour and after swimming. Sunscreen examples include Neutrogena, CeraVe, Blue Lizard, Elta MD and many others.    Proper skin care from [facility]:    -Eliminate harsh soaps as they strip the natural oils from the skin, often resulting in dry itchy skin ( i.e. Dial, Zest, Guyanese Spring)  -Use mild soaps such as Cetaphil or Dove Sensitive Skin in the shower. You do not need to use soap on arms, legs, and trunk every time you shower unless visibly soiled.   -Avoid hot or cold showers.  -After showering, lightly dry off and apply moisturizing within 2-3 minutes. This will help trap moisture in the skin.   -Aggressive use of a moisturizer at least 1-2 times a day to the entire body (including -Vanicream, Cetaphil, Aquaphor or Cerave) and moisturize hands after every washing.  -We recommend using moisturizers that come in a tub that needs to be scooped out, not a pump. This has more of an oil base. It will hold moisture in your skin much better than a water base moisturizer. The above recommended are non-pore clogging.         It was a pleasure speaking with Anuradha Horn today.       Follow up in 2-3 years fbe

## 2021-05-26 LAB — COPATH REPORT: NORMAL

## 2021-06-21 ENCOUNTER — OFFICE VISIT (OUTPATIENT)
Dept: FAMILY MEDICINE | Facility: CLINIC | Age: 51
End: 2021-06-21
Payer: COMMERCIAL

## 2021-06-21 VITALS
BODY MASS INDEX: 26.95 KG/M2 | SYSTOLIC BLOOD PRESSURE: 112 MMHG | WEIGHT: 177.8 LBS | RESPIRATION RATE: 16 BRPM | DIASTOLIC BLOOD PRESSURE: 78 MMHG | HEART RATE: 65 BPM | HEIGHT: 68 IN | TEMPERATURE: 98.1 F | OXYGEN SATURATION: 97 %

## 2021-06-21 DIAGNOSIS — N95.2 ATROPHIC VAGINITIS: ICD-10-CM

## 2021-06-21 DIAGNOSIS — Z11.59 NEED FOR HEPATITIS C SCREENING TEST: ICD-10-CM

## 2021-06-21 DIAGNOSIS — Z12.4 SCREENING FOR CERVICAL CANCER: ICD-10-CM

## 2021-06-21 DIAGNOSIS — Z00.00 ROUTINE GENERAL MEDICAL EXAMINATION AT A HEALTH CARE FACILITY: Primary | ICD-10-CM

## 2021-06-21 DIAGNOSIS — E04.1 THYROID NODULE: ICD-10-CM

## 2021-06-21 DIAGNOSIS — Z12.11 SCREEN FOR COLON CANCER: ICD-10-CM

## 2021-06-21 PROCEDURE — 99396 PREV VISIT EST AGE 40-64: CPT | Performed by: FAMILY MEDICINE

## 2021-06-21 PROCEDURE — G0123 SCREEN CERV/VAG THIN LAYER: HCPCS | Performed by: FAMILY MEDICINE

## 2021-06-21 PROCEDURE — 86803 HEPATITIS C AB TEST: CPT | Performed by: FAMILY MEDICINE

## 2021-06-21 PROCEDURE — 36415 COLL VENOUS BLD VENIPUNCTURE: CPT | Performed by: FAMILY MEDICINE

## 2021-06-21 PROCEDURE — 84443 ASSAY THYROID STIM HORMONE: CPT | Performed by: FAMILY MEDICINE

## 2021-06-21 PROCEDURE — 87624 HPV HI-RISK TYP POOLED RSLT: CPT | Performed by: FAMILY MEDICINE

## 2021-06-21 RX ORDER — ESTRADIOL 0.1 MG/G
CREAM VAGINAL
Qty: 42.5 G | Refills: 3 | Status: SHIPPED | OUTPATIENT
Start: 2021-06-21 | End: 2022-10-27

## 2021-06-21 ASSESSMENT — ENCOUNTER SYMPTOMS
JOINT SWELLING: 0
SHORTNESS OF BREATH: 0
FEVER: 0
HEMATOCHEZIA: 0
FREQUENCY: 0
WEAKNESS: 0
ABDOMINAL PAIN: 0
DIARRHEA: 0
NAUSEA: 0
COUGH: 0
SORE THROAT: 0
HEMATURIA: 0
CONSTIPATION: 0
PALPITATIONS: 0
MYALGIAS: 0
ARTHRALGIAS: 0
PARESTHESIAS: 0
NERVOUS/ANXIOUS: 0
HEADACHES: 0
HEARTBURN: 0
CHILLS: 0
DIZZINESS: 0
BREAST MASS: 0
EYE PAIN: 0
DYSURIA: 0

## 2021-06-21 ASSESSMENT — MIFFLIN-ST. JEOR: SCORE: 1472.06

## 2021-06-21 NOTE — PROGRESS NOTES
SUBJECTIVE:   CC: Anuradha Horn is an 50 year old woman who presents for preventive health visit.       Patient has been advised of split billing requirements and indicates understanding: Yes  Healthy Habits:     Getting at least 3 servings of Calcium per day:  Yes    Bi-annual eye exam:  Yes    Dental care twice a year:  Yes    Sleep apnea or symptoms of sleep apnea:  None    Diet:  Regular (no restrictions)    Frequency of exercise:  4-5 days/week    Duration of exercise:  30-45 minutes    Taking medications regularly:  Yes    Medication side effects:  None    PHQ-2 Total Score: 0    Additional concerns today:  No    Thyroid us scheduled in July  To f/u thyroid nodules  Now noting occ hoarseness. Episodic and not everyday.  We will have him follow-up with endocrinology.    No migraines for 6-8 months.  Feels processed foods may be a trigger for her    Menopausal since 42   Estrogen cream hellpful for vaginal dryness and she would like a refill.     Lost weight with walking.     Today's PHQ-2 Score:   PHQ-2 ( 1999 Pfizer) 6/21/2021   Q1: Little interest or pleasure in doing things 0   Q2: Feeling down, depressed or hopeless 0   PHQ-2 Score 0   Q1: Little interest or pleasure in doing things Not at all   Q2: Feeling down, depressed or hopeless Not at all   PHQ-2 Score 0       Abuse: Current or Past (Physical, Sexual or Emotional) - No  Do you feel safe in your environment? Yes    Have you ever done Advance Care Planning? (For example, a Health Directive, POLST, or a discussion with a medical provider or your loved ones about your wishes): No, advance care planning information given to patient to review.  Advanced care planning was discussed at today's visit.    Social History     Tobacco Use     Smoking status: Never Smoker     Smokeless tobacco: Never Used   Substance Use Topics     Alcohol use: Yes     Comment: 2 drinks monthly          Alcohol Use 6/21/2021   Prescreen: >3 drinks/day or >7  drinks/week? No   Prescreen: >3 drinks/day or >7 drinks/week? -       Reviewed orders with patient.  Reviewed health maintenance and updated orders accordingly - Yes      Breast Cancer Screening:    FSH-7:   Breast CA Risk Assessment (FHS-7) 6/21/2021   Did any of your first-degree relatives have breast or ovarian cancer? Yes   Did any of your relatives have bilateral breast cancer? No   Did any man in your family have breast cancer? No   Did any woman in your family have breast and ovarian cancer? Yes   Did any woman in your family have breast cancer before age 50 y? No   Do you have 2 or more relatives with breast and/or ovarian cancer? No   Do you have 2 or more relatives with breast and/or bowel cancer? No       Mammogram Screening: Recommended annual mammography  Pertinent mammograms are reviewed under the imaging tab.    History of abnormal Pap smear: NO - age 30-65 PAP every 5 years with negative HPV co-testing recommended  PAP / HPV Latest Ref Rng & Units 3/30/2017   PAP - NIL   HPV 16 DNA NEG Negative   HPV 18 DNA NEG Negative   OTHER HR HPV NEG Negative     Reviewed and updated as needed this visit by clinical staff  Tobacco  Allergies  Meds   Med Hx  Surg Hx  Fam Hx  Soc Hx        Reviewed and updated as needed this visit by Provider                    Review of Systems   Constitutional: Negative for chills and fever.   HENT: Negative for congestion, ear pain, hearing loss and sore throat.    Eyes: Negative for pain and visual disturbance.   Respiratory: Negative for cough and shortness of breath.    Cardiovascular: Negative for chest pain, palpitations and peripheral edema.   Gastrointestinal: Negative for abdominal pain, constipation, diarrhea, heartburn, hematochezia and nausea.   Breasts:  Negative for tenderness, breast mass and discharge.   Genitourinary: Negative for dysuria, frequency, genital sores, hematuria, pelvic pain, urgency, vaginal bleeding and vaginal discharge.   Musculoskeletal:  "Negative for arthralgias, joint swelling and myalgias.   Skin: Negative for rash.   Neurological: Negative for dizziness, weakness, headaches and paresthesias.   Psychiatric/Behavioral: Negative for mood changes. The patient is not nervous/anxious.        OBJECTIVE:   /78   Pulse 65   Temp 98.1  F (36.7  C) (Oral)   Resp 16   Ht 1.715 m (5' 7.5\")   Wt 80.6 kg (177 lb 12.8 oz)   LMP 04/15/2010   SpO2 97%   BMI 27.44 kg/m    Physical Exam  GENERAL: healthy, alert and no distress  EYES: Eyes grossly normal to inspection, PERRL and conjunctivae and sclerae normal  HENT: ear canals and TM's normal, nose and mouth without ulcers or lesions  NECK: no adenopathy, no asymmetry, masses, or scars and thyroid enlarged R>L  RESP: lungs clear to auscultation - no rales, rhonchi or wheezes  BREAST: normal without masses, tenderness or nipple discharge and no palpable axillary masses or adenopathy  CV: regular rate and rhythm, normal S1 S2, no S3 or S4, no murmur, click or rub, no peripheral edema and peripheral pulses strong  ABDOMEN: soft, nontender, no hepatosplenomegaly, no masses and bowel sounds normal   (female): normal female external genitalia, normal urethral meatus, vaginal mucosa pink, moist, well rugated, and normal cervix/adnexa/uterus without masses or discharge  MS: no gross musculoskeletal defects noted, no edema  SKIN: no suspicious lesions or rashes  NEURO: Normal strength and tone, mentation intact and speech normal  PSYCH: mentation appears normal, affect normal/bright    Diagnostic Test Results:  none     ASSESSMENT/PLAN:   1. Routine general medical examination at a health care facility  Declined referral for cancer risk mgmt for fam hx of breast ca.     2. Thyroid nodule  Following with endocrinology for her nodules  - TSH with free T4 reflex    3. Need for hepatitis C screening test  - **Hepatitis C Screen Reflex to RNA FUTURE anytime    4. Screen for colon cancer  - GASTROENTEROLOGY ADULT " "REF PROCEDURE ONLY; Future    5. Screening for cervical cancer  - Pap imaged thin layer screen with HPV - recommended age 30 - 65 years (select HPV order below)  - HPV High Risk Types DNA Cervical    6. Atrophic vaginitis  Cream working well.   - estradiol (ESTRACE VAGINAL) 0.1 MG/GM vaginal cream; 1 gm PV Q HS x 2 weeks, then 2-3 x's weekly thereafter  Dispense: 42.5 g; Refill: 3      COUNSELING:  Reviewed preventive health counseling, as reflected in patient instructions       Regular exercise       Healthy diet/nutrition       Vision screening       Osteoporosis prevention/bone health       Colon cancer screening       Consider Hep C screening for all patients one time for ages 18-79 years       (Anna)menopause management    Estimated body mass index is 28.75 kg/m  as calculated from the following:    Height as of 1/29/20: 1.727 m (5' 8\").    Weight as of 1/29/20: 85.8 kg (189 lb 1.6 oz).    Weight management plan: Discussed healthy diet and exercise guidelines    She reports that she has never smoked. She has never used smokeless tobacco.      Counseling Resources:  ATP IV Guidelines  Pooled Cohorts Equation Calculator  Breast Cancer Risk Calculator  BRCA-Related Cancer Risk Assessment: FHS-7 Tool  FRAX Risk Assessment  ICSI Preventive Guidelines  Dietary Guidelines for Americans, 2010  USDA's MyPlate  ASA Prophylaxis  Lung CA Screening    Laine Canas MD  [facility]    "

## 2021-06-21 NOTE — PATIENT INSTRUCTIONS
Schedule medical assistant only visit for the shingles vaccination.     Please call [facility] in [place] at [phone] to schedule colonoscopy.     Preventive Health Recommendations  Female Ages 40 to 49    Yearly exam:     See your health care provider every year in order to  1. Review health changes.   2. Discuss preventive care.    3. Review your medicines if your doctor prescribed any.      Get a Pap test every three years (unless you have an abnormal result and your provider advises testing more often).      If you get Pap tests with HPV test, you only need to test every 5 years, unless you have an abnormal result. You do not need a Pap test if your uterus was removed (hysterectomy) and you have not had cancer.      You should be tested each year for STDs (sexually transmitted diseases), if you're at risk.     Ask your doctor if you should have a mammogram.      Have a colonoscopy (test for colon cancer) if someone in your family has had colon cancer or polyps before age 50.       Have a cholesterol test every 5 years.       Have a diabetes test (fasting glucose) after age 45. If you are at risk for diabetes, you should have this test every 3 years.    Shots: Get a flu shot each year. Get a tetanus shot every 10 years.     Nutrition:     Eat at least 5 servings of fruits and vegetables each day.    Eat whole-grain bread, whole-wheat pasta and brown rice instead of white grains and rice.    Get adequate Calcium and Vitamin D.      Lifestyle    Exercise at least 150 minutes a week (an average of 30 minutes a day, 5 days a week). This will help you control your weight and prevent disease.    Limit alcohol to one drink per day.    No smoking.     Wear sunscreen to prevent skin cancer.    See your dentist every six months for an exam and cleaning.

## 2021-06-22 LAB
HCV AB SERPL QL IA: NONREACTIVE
TSH SERPL DL<=0.005 MIU/L-ACNC: 1.56 MU/L (ref 0.4–4)

## 2021-06-24 LAB
COPATH REPORT: NORMAL
PAP: NORMAL

## 2021-06-25 LAB
FINAL DIAGNOSIS: NORMAL
HPV HR 12 DNA CVX QL NAA+PROBE: NEGATIVE
HPV16 DNA SPEC QL NAA+PROBE: NEGATIVE
HPV18 DNA SPEC QL NAA+PROBE: NEGATIVE
SPECIMEN DESCRIPTION: NORMAL
SPECIMEN SOURCE CVX/VAG CYTO: NORMAL

## 2021-07-02 ENCOUNTER — ANCILLARY PROCEDURE (OUTPATIENT)
Dept: ULTRASOUND IMAGING | Facility: CLINIC | Age: 51
End: 2021-07-02
Attending: INTERNAL MEDICINE
Payer: COMMERCIAL

## 2021-07-02 DIAGNOSIS — E04.1 THYROID NODULE: ICD-10-CM

## 2021-07-02 PROCEDURE — 76536 US EXAM OF HEAD AND NECK: CPT | Performed by: STUDENT IN AN ORGANIZED HEALTH CARE EDUCATION/TRAINING PROGRAM

## 2021-07-15 ENCOUNTER — TELEPHONE (OUTPATIENT)
Dept: GASTROENTEROLOGY | Facility: CLINIC | Age: 51
End: 2021-07-15

## 2021-07-15 NOTE — TELEPHONE ENCOUNTER
Screening Questions  1. Are you active on mychart? Y    2. What insurance is in the chart? BCBS    2.  Ordering/Referring Provider: Laine Canas MD    3. BMI 25.1    4. Are you on daily home oxygen? n    5. Do you have a history of difficult airway? n    6. Have you had a heart, lung, or liver transplant? n    7. Are you currently on dialysis? n    8. Have you had a stroke or Transient ischemic atttack (TIA) within 6 months? n    9. In the past 6 months, have you had any heart related issues including cardiomyopathy or heart attack?         If yes, did it require cardiac stenting or other implantable device?n    10. Do you have any implantable devices in your body (pacemaker, defib, LVAD)? n    11. Do you take nitroglycerin? If yes, how often? n    12. Are you currently taking any blood thinners?n    13. Are you a diabetic? n    14. (Females) Are you currently pregnant? n  If yes, how many weeks?    15. Have you had a procedure in the past that was difficult to tolerate with conscious sedation? Any allergies to Fentanyl or Versed n    16. Are you taking any scheduled prescription narcotics more than once daily? n    17. Do you have any chemical dependencies such as alcohol, street drugs, or methadone? n    18. Do you have any history of post-traumatic stress syndrome or mental health issues? n    19. Do you transfer independently? y    20.  Do you have any issues with constipation? n    21. Preferred Pharmacy for Pre Prescription     Scheduling Details    Colonoscopy Prep Sent?:   Procedure Scheduled: Colonoscopy  Provider/Surgeon: Odin  Date of Procedure: 9/24  Location:   Caller (Please ask for phone number if not scheduled by patient):       Sedation Type: CS  Conscious Sedation- Needs  for 6 hours after the procedure  MAC/General-Needs  for 24 hours after procedure    Pre-op Required at [facility] [place], [place] and OR for MAC sedation:   (if yes advise patient they will need  a pre-op prior to procedure)      Is patient on blood thinners? -N (If yes- inform patient to follow up with PCP or provider for follow up instructions)     Informed patient they will need an adult  Y  Cannot take any type of public or medical transportation alone    Informed Patient of COVID Test Requirement Y    Confirmed Nurse will call to complete assessment Y    Additional comments:

## 2021-07-18 DIAGNOSIS — Z11.59 ENCOUNTER FOR SCREENING FOR OTHER VIRAL DISEASES: ICD-10-CM

## 2021-07-21 NOTE — TELEPHONE ENCOUNTER
Reached out to patient to let her know [facility] did not have any lab appointment available. If she has any questions, writer left the phone number to Endoscopy and writer name.

## 2021-07-27 ENCOUNTER — OFFICE VISIT (OUTPATIENT)
Dept: ENDOCRINOLOGY | Facility: CLINIC | Age: 51
End: 2021-07-27
Payer: COMMERCIAL

## 2021-07-27 VITALS
BODY MASS INDEX: 27.7 KG/M2 | OXYGEN SATURATION: 98 % | SYSTOLIC BLOOD PRESSURE: 106 MMHG | HEART RATE: 79 BPM | WEIGHT: 179.5 LBS | DIASTOLIC BLOOD PRESSURE: 75 MMHG

## 2021-07-27 DIAGNOSIS — E04.1 THYROID NODULE: Primary | ICD-10-CM

## 2021-07-27 DIAGNOSIS — M81.0 OSTEOPOROSIS WITHOUT CURRENT PATHOLOGICAL FRACTURE, UNSPECIFIED OSTEOPOROSIS TYPE: ICD-10-CM

## 2021-07-27 DIAGNOSIS — R49.0 HOARSENESS OF VOICE: ICD-10-CM

## 2021-07-27 PROCEDURE — 99215 OFFICE O/P EST HI 40 MIN: CPT | Performed by: INTERNAL MEDICINE

## 2021-07-27 RX ORDER — ALENDRONATE SODIUM 70 MG/1
70 TABLET ORAL
Qty: 12 TABLET | Refills: 3 | Status: SHIPPED | OUTPATIENT
Start: 2021-07-27 | End: 2021-07-27

## 2021-07-27 RX ORDER — ALENDRONATE SODIUM 70 MG/1
70 TABLET ORAL
Qty: 12 TABLET | Refills: 3 | Status: SHIPPED | OUTPATIENT
Start: 2021-07-27 | End: 2022-08-18

## 2021-07-27 NOTE — PROGRESS NOTES
Endocrinology Clinic Visit    Chief Complaint: Thyroid Disease     Information obtained from:Patient    Subjective:         HPI: Anuradha Horn is a 50 year old female here for osteoporosis.     Menopause was at the age of 41; because of early menopause she underwent a DEXA scan study 6 years ago.  The Z scores were above -2. Follow up study showed worsening bone density with -15.4 change compared to the previous at the spine and -9.1 at hip.  Details are copied below.  The main risk factor is again menopause 10 years ago due to premature ovarian failure.  She has not been treated with hormone replacement therapy.  No history of fractures.  Denies loss of loss of height.  No family history of osteoporosis.  No history of chronic medical conditions known to predispose for osteoporosis including diabetes, rheumatoid arthritis.  She does not take steroids currently or no past history of steroid intake.  She does take calcium or vitamin D on regular basis in addition to a multivitamin.  No regular exercise.  We started her on Fosamax about a year ago and she is tolerating that very well.      Reports that she has been having hoarseness of voice intermittent over the last 1 year at least.  No problems swallowing or breathing.  No snoring.  Other review of systems negative.  _  Nodule 1:  Lobe: Right  Location: Mid lobe  Size: 1.4 x 1.1 x 0.5 cm  TIRADS: TR4 (4-6 points)      Nodule 2:  Lobe: Right  Location: Inferior  Size: 4.0 x 3.9 x 2.2 cm  TIRADS: TR4 (4-6 points)  - Previously benign by fine needle  aspiration in 2018.     Nodule 3:  Lobe: Isthmus  Location: Mid  Size: 1.0 x 0.7 x 0.4 cm  Composition: Solid or almost completely solid (2 points)  TIRADS: TR3 (3 points)      Nodule 4:  Lobe: Left  Location: Mid lobe  Size: 0.7 x 0.6 x 0.4 cm  TIRADS: TR4 (4-6 points)      Nodule 5:  Lobe: Left  Location: Inferior lobe  Size: 0.6 x 0.7 x 0.4 cm  TIRADS: TR4 (4-6 points)      Thyroid, right nodule #2,  ultrasound guided fine needle aspiration        Thyroid, right nodule #2, ultrasound guided fine needle aspiration:     Benign   Consistent with a benign nodule (includes adenomatoid nodule, colloid   nodule, etc.)    No family history of thyroid cancer or radiation treatment as a child.  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     No fractures.      No Known Allergies    Current Outpatient Medications   Medication Sig Dispense Refill     alendronate (FOSAMAX) 70 MG tablet Take 1 tablet (70 mg) by mouth every 7 days Take 60 minutes before am meal with 8 oz. water. Remain upright for 30 minutes. 12 tablet 3     Ascorbic Acid (VITAMIN C PO)        calcium citrate-vitamin D (CITRACAL W/D) 250-100 MG-UNIT tablet Take 2 tablets by mouth 2 times daily 180 tablet 3     estradiol (ESTRACE VAGINAL) 0.1 MG/GM vaginal cream 1 gm PV Q HS x 2 weeks, then 2-3 x's weekly thereafter 42.5 g 3     Multiple Vitamins-Minerals (DAILY MULTI PO) Take 1 capsule by mouth daily         Review of Systems     as per HPI above    Objective:   /75 (BP Location: Left arm, Patient Position: Sitting, Cuff Size: Adult Regular)   Pulse 79   Wt 81.4 kg (179 lb 8 oz)   LMP 04/15/2010   SpO2 98%   BMI 27.70 kg/m    Constitutional: Pleasant no acute cardiopulmonary distress.   EYES: anicteric, normal extra-ocular movements, no lid lag or retraction, is equal and reactive to light bilaterally.  HEENT: Mouth/Throat: Mucous membrane is moist. Oropharynx is clear.  Thyroid examination: Palpable right-sided thyroid nodule measuring about 4 cm and isthmus nodule.  Cardiovascular: RRR, S1, S2 normal.   Pulmonary/Chest: CTAB. No wheezing or rales.   Neurological: Alert and oriented.  Muscle strength 5/5.   Extremities: No edema.  Psychological: appropriate mood and affect   In House Labs:       TSH   Date Value Ref Range Status   06/21/2021 1.56 0.40 - 4.00 mU/L Final   07/30/2020 1.20 0.40 - 4.00 mU/L Final   11/05/2018 1.39 0.40 -  4.00 mU/L Final   07/24/2014 1.03 0.4 - 5.0 mU/L Final       Creatinine   Date Value Ref Range Status   09/24/2020 0.69 0.52 - 1.04 mg/dL Final   ]    Assessment/Treatment Plan:      Multiple thyroid nodules/hoarseness of voice/visible neck enlargement: Options of management discussed in detail.  Prefers thyroidectomy and removal of the dominant nodule.  Referral to ENT arranged for further assessment of hoarseness of voice.  Given consideration for right-sided thyroidectomy and isthmectomy; would biopsy the left-sided nodule measuring 0.7 cm with punctate echogenic foci and the right-sided nodule measuring 1.4 cm/hypoechoic which was not biopsied previously.  FNA of the thyroid nodules ordered.  ENT referral placed.  Further plan based on results.  Discussed risk of hypothyroidism and the need for replacement therapy with levothyroxine following thyroidectomy and patient is agreeable with plan.    Osteoporosis without current fracture -Z score and T-scores for lumbar spines involving L3-L4 -3.6 and -3.2 respectively.  Compared to previous study there is -15.6 deterioration in the spine bone density and similarly at the hip -9.1.  risk factor is early or premature menopause 10 years ago.  Plan    To identify additional risk factors checked parathyroid hormone, screening for celiac disease and other routine labs which were within the normal limits.  Vitamin D has been checked previously and within acceptable range.        Adequate calcium and vitamin D discussed about calcium 1200 mg daily from all sources and vitamin D 1000 IUs daily.        Strengthening exercises like walking and other strengthening exercises.      Continue alendronate at the same dose.      I will contact the patient with the test results.  Return to clinic in 12 months.    Test and/or medications prescribed today:  Orders Placed This Encounter   Procedures     US Biopsy Thyroid Fine Needle Aspiration     Otolaryngology Referral         David  MD Evon  Staff Endocrinologist    Division of Endocrinology and Diabetes    40 minutes spent on the date of the encounter doing chart review, history and exam, documentation and further activities per the note; more than 50% was spent face-to-face with the patient.

## 2021-07-27 NOTE — LETTER
7/27/2021         RE: Anuradha Horn  [id number]  [facility] [address]        Dear Colleague,    Thank you for referring your patient, Anuradha Horn, to the [facility]. Please see a copy of my visit note below.    Endocrinology Clinic Visit    Chief Complaint: Thyroid Disease     Information obtained from:Patient    Subjective:         HPI: Anuradha Horn is a 50 year old female here for osteoporosis.     Menopause was at the age of 41; because of early menopause she underwent a DEXA scan study 6 years ago.  The Z scores were above -2. Follow up study showed worsening bone density with -15.4 change compared to the previous at the spine and -9.1 at hip.  Details are copied below.  The main risk factor is again menopause 10 years ago due to premature ovarian failure.  She has not been treated with hormone replacement therapy.  No history of fractures.  Denies loss of loss of height.  No family history of osteoporosis.  No history of chronic medical conditions known to predispose for osteoporosis including diabetes, rheumatoid arthritis.  She does not take steroids currently or no past history of steroid intake.  She does take calcium or vitamin D on regular basis in addition to a multivitamin.  No regular exercise.  We started her on Fosamax about a year ago and she is tolerating that very well.      Reports that she has been having hoarseness of voice intermittent over the last 1 year at least.  No problems swallowing or breathing.  No snoring.  Other review of systems negative.  _  Nodule 1:  Lobe: Right  Location: Mid lobe  Size: 1.4 x 1.1 x 0.5 cm  TIRADS: TR4 (4-6 points)      Nodule 2:  Lobe: Right  Location: Inferior  Size: 4.0 x 3.9 x 2.2 cm  TIRADS: TR4 (4-6 points)  - Previously benign by fine needle  aspiration in 2018.     Nodule 3:  Lobe: Isthmus  Location: Mid  Size: 1.0 x 0.7 x 0.4 cm  Composition: Solid or almost completely solid (2  points)  TIRADS: TR3 (3 points)      Nodule 4:  Lobe: Left  Location: Mid lobe  Size: 0.7 x 0.6 x 0.4 cm  TIRADS: TR4 (4-6 points)      Nodule 5:  Lobe: Left  Location: Inferior lobe  Size: 0.6 x 0.7 x 0.4 cm  TIRADS: TR4 (4-6 points)      Thyroid, right nodule #2, ultrasound guided fine needle aspiration        Thyroid, right nodule #2, ultrasound guided fine needle aspiration:     Benign   Consistent with a benign nodule (includes adenomatoid nodule, colloid   nodule, etc.)    No family history of thyroid cancer or radiation treatment as a child.  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     No fractures.      No Known Allergies    Current Outpatient Medications   Medication Sig Dispense Refill     alendronate (FOSAMAX) 70 MG tablet Take 1 tablet (70 mg) by mouth every 7 days Take 60 minutes before am meal with 8 oz. water. Remain upright for 30 minutes. 12 tablet 3     Ascorbic Acid (VITAMIN C PO)        calcium citrate-vitamin D (CITRACAL W/D) 250-100 MG-UNIT tablet Take 2 tablets by mouth 2 times daily 180 tablet 3     estradiol (ESTRACE VAGINAL) 0.1 MG/GM vaginal cream 1 gm PV Q HS x 2 weeks, then 2-3 x's weekly thereafter 42.5 g 3     Multiple Vitamins-Minerals (DAILY MULTI PO) Take 1 capsule by mouth daily         Review of Systems     as per HPI above    Objective:   /75 (BP Location: Left arm, Patient Position: Sitting, Cuff Size: Adult Regular)   Pulse 79   Wt 81.4 kg (179 lb 8 oz)   LMP 04/15/2010   SpO2 98%   BMI 27.70 kg/m    Constitutional: Pleasant no acute cardiopulmonary distress.   EYES: anicteric, normal extra-ocular movements, no lid lag or retraction, is equal and reactive to light bilaterally.  HEENT: Mouth/Throat: Mucous membrane is moist. Oropharynx is clear.  Thyroid examination: Palpable right-sided thyroid nodule measuring about 4 cm and isthmus nodule.  Cardiovascular: RRR, S1, S2 normal.   Pulmonary/Chest: CTAB. No wheezing or rales.   Neurological: Alert and  oriented.  Muscle strength 5/5.   Extremities: No edema.  Psychological: appropriate mood and affect   In House Labs:       TSH   Date Value Ref Range Status   06/21/2021 1.56 0.40 - 4.00 mU/L Final   07/30/2020 1.20 0.40 - 4.00 mU/L Final   11/05/2018 1.39 0.40 - 4.00 mU/L Final   07/24/2014 1.03 0.4 - 5.0 mU/L Final       Creatinine   Date Value Ref Range Status   09/24/2020 0.69 0.52 - 1.04 mg/dL Final   ]    Assessment/Treatment Plan:      Multiple thyroid nodules/hoarseness of voice/visible neck enlargement: Options of management discussed in detail.  Prefers thyroidectomy and removal of the dominant nodule.  Referral to ENT arranged for further assessment of hoarseness of voice.  Given consideration for right-sided thyroidectomy and isthmectomy; would biopsy the left-sided nodule measuring 0.7 cm with punctate echogenic foci and the right-sided nodule measuring 1.4 cm/hypoechoic which was not biopsied previously.  FNA of the thyroid nodules ordered.  ENT referral placed.  Further plan based on results.  Discussed risk of hypothyroidism and the need for replacement therapy with levothyroxine following thyroidectomy and patient is agreeable with plan.    Osteoporosis without current fracture -Z score and T-scores for lumbar spines involving L3-L4 -3.6 and -3.2 respectively.  Compared to previous study there is -15.6 deterioration in the spine bone density and similarly at the hip -9.1.  risk factor is early or premature menopause 10 years ago.  Plan    To identify additional risk factors checked parathyroid hormone, screening for celiac disease and other routine labs which were within the normal limits.  Vitamin D has been checked previously and within acceptable range.        Adequate calcium and vitamin D discussed about calcium 1200 mg daily from all sources and vitamin D 1000 IUs daily.        Strengthening exercises like walking and other strengthening exercises.      Continue alendronate at the same  dose.      I will contact the patient with the test results.  Return to clinic in 12 months.    Test and/or medications prescribed today:  Orders Placed This Encounter   Procedures     US Biopsy Thyroid Fine Needle Aspiration     Otolaryngology Referral         David Barnard MD  Staff Endocrinologist    Division of Endocrinology and Diabetes    40 minutes spent on the date of the encounter doing chart review, history and exam, documentation and further activities per the note; more than 50% was spent face-to-face with the patient.        Again, thank you for allowing me to participate in the care of your patient.        Sincerely,        David Barnard MD

## 2021-07-27 NOTE — NURSING NOTE
Anuradha Horn's goals for this visit include:   Chief Complaint   Patient presents with     Thyroid Disease     She requests these members of her care team be copied on today's visit information: Yes    PCP: Laine Canas    Referring Provider:  Laine Canas MD  [address] [place] [address]    /75 (BP Location: Left arm, Patient Position: Sitting, Cuff Size: Adult Regular)   Pulse 79   Wt 81.4 kg (179 lb 8 oz)   LMP 04/15/2010   SpO2 98%   BMI 27.70 kg/m      Do you need any medication refills at today's visit? Yes

## 2021-08-02 ENCOUNTER — TELEPHONE (OUTPATIENT)
Dept: ENDOCRINOLOGY | Facility: CLINIC | Age: 51
End: 2021-08-02

## 2021-08-02 NOTE — TELEPHONE ENCOUNTER
8/2 1st attempt.  LVM for patient to schedule a thyroid FNA at  and 1 year follow up with Dr. Barnard around 7/27/22.    Please assist patient in scheduling when she calls back.    Thanks    Nelly Powers  Pediatric Specialty /Adult Endocrinology  [facility]

## 2021-08-03 DIAGNOSIS — Z11.59 ENCOUNTER FOR SCREENING FOR OTHER VIRAL DISEASES: ICD-10-CM

## 2021-08-20 ENCOUNTER — ANCILLARY PROCEDURE (OUTPATIENT)
Dept: ULTRASOUND IMAGING | Facility: CLINIC | Age: 51
End: 2021-08-20
Attending: INTERNAL MEDICINE
Payer: COMMERCIAL

## 2021-08-20 ENCOUNTER — OFFICE VISIT (OUTPATIENT)
Dept: OTOLARYNGOLOGY | Facility: CLINIC | Age: 51
End: 2021-08-20
Attending: INTERNAL MEDICINE
Payer: COMMERCIAL

## 2021-08-20 VITALS — SYSTOLIC BLOOD PRESSURE: 134 MMHG | DIASTOLIC BLOOD PRESSURE: 88 MMHG | OXYGEN SATURATION: 100 % | HEART RATE: 63 BPM

## 2021-08-20 DIAGNOSIS — K21.9 LPRD (LARYNGOPHARYNGEAL REFLUX DISEASE): Primary | ICD-10-CM

## 2021-08-20 DIAGNOSIS — R49.0 HOARSENESS OF VOICE: ICD-10-CM

## 2021-08-20 DIAGNOSIS — E04.1 THYROID NODULE: ICD-10-CM

## 2021-08-20 PROCEDURE — 10005 FNA BX W/US GDN 1ST LES: CPT | Performed by: RADIOLOGY

## 2021-08-20 PROCEDURE — 99203 OFFICE O/P NEW LOW 30 MIN: CPT | Mod: 25 | Performed by: OTOLARYNGOLOGY

## 2021-08-20 PROCEDURE — 31575 DIAGNOSTIC LARYNGOSCOPY: CPT | Performed by: OTOLARYNGOLOGY

## 2021-08-20 PROCEDURE — 88173 CYTOPATH EVAL FNA REPORT: CPT | Performed by: RADIOLOGY

## 2021-08-20 PROCEDURE — 10006 FNA BX W/US GDN EA ADDL: CPT | Performed by: RADIOLOGY

## 2021-08-20 RX ORDER — LIDOCAINE HYDROCHLORIDE 10 MG/ML
9 INJECTION, SOLUTION EPIDURAL; INFILTRATION; INTRACAUDAL; PERINEURAL ONCE
Status: COMPLETED | OUTPATIENT
Start: 2021-08-20 | End: 2021-08-20

## 2021-08-20 RX ADMIN — LIDOCAINE HYDROCHLORIDE 9 ML: 10 INJECTION, SOLUTION EPIDURAL; INFILTRATION; INTRACAUDAL; PERINEURAL at 09:34

## 2021-08-20 ASSESSMENT — ENCOUNTER SYMPTOMS
COUGH: 0
DOUBLE VISION: 0
HEARTBURN: 0
HEMOPTYSIS: 0
BLURRED VISION: 0
TREMORS: 0
PHOTOPHOBIA: 0
TINGLING: 0
VOMITING: 0
HEADACHES: 0
CONSTITUTIONAL NEGATIVE: 1
SPUTUM PRODUCTION: 0
DIZZINESS: 0
NAUSEA: 0

## 2021-08-20 NOTE — PROGRESS NOTES
HPI    This pleasant patient is having raspy voice from time to time for months. Denies any environmental allergies, cough, odynophagia, dysphagia, weight changes, fever, night sweat, post nasal drip or heartburn.     ENT Problem List  Migraine with aura and without status migrainosus, not intractable  Thyroid nodule  CARDIOVASCULAR SCREENING; LDL GOAL LESS THAN 160  Calculus of gallbladder without cholecystitis without obstruction  Atrophic vaginitis  Diminished ovarian reserve        Medications       alendronate (FOSAMAX) 70 MG tablet  Ascorbic Acid (VITAMIN C PO)  calcium citrate-vitamin D (CITRACAL W/D) 250-100 MG-UNIT tablet  estradiol (ESTRACE VAGINAL) 0.1 MG/GM vaginal cream  Multiple Vitamins-Minerals (DAILY MULTI PO)    US THYROID 7/2/2021 7:53 AM     COMPARISON: 8/6/2020, 11/14/2018, 11/7/2018     HISTORY: Thyroid nodule.  Previous benign right lobe thyroid nodule.     FINDINGS:   Thyroid parenchyma: homogenous  The right lobe of the thyroid measures: 6.0 x 1.4 x 2.4 cm  The left lobe of the thyroid measures: 4.4 x 1.3 x 1.5 cm  The thyroid isthmus measures: 0.6 cm in thickness     Nodule 1:  Lobe: Right  Location: Mid lobe  Size: 1.4 x 1.1 x 0.5 cm  Composition: Solid or almost completely solid (2 points)  Echogenicity: Hypoechoic (2 points)  Shape: Wider than tall (0 points)  Margin: Smooth (0 points)  Echogenic Foci: None or large comet tail artifact (0 points)  Stability: No significant change in size  TIRADS: TR4 (4-6 points)      Nodule 2:  Lobe: Right  Location: Inferior  Size: 4.0 x 3.9 x 2.2 cm  Composition: Solid or almost completely solid (2 points)  Echogenicity: Hypoechoic (2 points)  Shape: Wider than tall (0 points)  Margin: Smooth (0 points)  Echogenic Foci: None or large comet tail artifact (0 points)  Stability: No significant change in size  TIRADS: TR4 (4-6 points)  - Previously benign by fine needle  aspiration in 2018.     Nodule 3:  Lobe: Isthmus  Location: Mid  Size: 1.0 x 0.7 x  0.4 cm  Composition: Solid or almost completely solid (2 points)  Echogenicity: Hyperechoic or isoechoic (1 point)  Shape: Wider than tall (0 points)  Margin: Ill-defined (0 points)  Echogenic Foci: None or large comet tail artifact (0 points)  Stability: No significant change in size  TIRADS: TR3 (3 points)      Nodule 4:  Lobe: Left  Location: Mid lobe  Size: 0.7 x 0.6 x 0.4 cm  Composition: Solid or almost completely solid (2 points)  Echogenicity: Hypoechoic (2 points)  Shape: Wider than tall (0 points)  Margin: Ill-defined (0 points)  Echogenic Foci: None or large comet tail artifact (0 points)  Stability: No significant change in size  TIRADS: TR4 (4-6 points)      Nodule 5:  Lobe: Left  Location: Inferior lobe  Size: 0.6 x 0.7 x 0.4 cm  Composition: Solid or almost completely solid (2 points)  Echogenicity: Hypoechoic (2 points)  Shape: Wider than tall (0 points)  Margin: Ill-defined (0 points)  Echogenic Foci: Punctate echogenic foci (3 points)  Stability: No significant change in size  TIRADS: TR4 (4-6 points)                                                                       Impression:  Bilateral thyroid nodules as above without significant  change since 2020.  Follow-up thyroid ultrasound in 1 year per  guidelines.     ACR TI-RADS recommendations  TR2 (2 points) & TR1 (0 points) -No FNA or follow-up  TR3 (3 points) - FNA if ? 2.5cm, follow-up if 1.5 -2.4 cm in 1, 3 and  5 years  TR4 (4-6 points) - FNA if ? 1.5cm, follow-up if 1 -1.4 cm in 1, 2, 3  and 5 years  TR5 (?7 points) - FNA if ? 1cm, follow-up if 0.5 -0.9 cm every year  for 5 years      Review of Systems   Constitutional: Negative.    HENT: Negative.    Eyes: Negative for blurred vision, double vision and photophobia.   Respiratory: Negative for cough, hemoptysis and sputum production.    Gastrointestinal: Negative for heartburn, nausea and vomiting.   Skin: Negative.    Neurological: Negative for dizziness, tingling, tremors and headaches.    Endo/Heme/Allergies: Negative for environmental allergies.         Physical Exam  Vitals reviewed.   Constitutional:       Appearance: Normal appearance. She is normal weight.   HENT:      Head: Normocephalic and atraumatic.      Jaw: There is normal jaw occlusion.      Salivary Glands: Right salivary gland is not diffusely enlarged or tender. Left salivary gland is not diffusely enlarged or tender.      Right Ear: Hearing, tympanic membrane, ear canal and external ear normal. No decreased hearing noted. No middle ear effusion. There is no impacted cerumen.      Left Ear: Hearing, tympanic membrane, ear canal and external ear normal. No decreased hearing noted.  No middle ear effusion. There is no impacted cerumen.      Nose: Nose normal. No septal deviation, congestion or rhinorrhea.      Right Turbinates: Enlarged.      Left Turbinates: Enlarged.      Right Sinus: No maxillary sinus tenderness or frontal sinus tenderness.      Left Sinus: No maxillary sinus tenderness or frontal sinus tenderness.      Mouth/Throat:      Mouth: Mucous membranes are moist.      Pharynx: Oropharynx is clear. Uvula midline.   Eyes:      Extraocular Movements: Extraocular movements intact.      Pupils: Pupils are equal, round, and reactive to light.   Neurological:      Mental Status: She is alert.         Diagnostic nasal endoscopy:     She was seen in the room and identified. Pros and cons of the procedure were explained to the patient. The procedure and its alternatives were explained to the patient in lay terms. Her questions were answered. Her symptoms required a diagnostic endoscopic evaluation under local anesthesia. After obtaining an informed consent from the patient, 3% Lidocaine with oxymethasoline spray was applied to each nostril. Then a flexible scopic exam was performed. Septum is in the midline. Ostiomeatal complexes are free of disease. No pus or polyp seen. Inferior turbinates are enlarged. Nasopharynx is normal.  Rosenmüller fossa and torus tubarius are normal. Epiglottis, hypopharynx, false vocal folds are normal. No pooling in pyriform fossae. Vocal cords are mobile, post glottic edema and hyperemia otherwise normal. She tolerated the procedure well and left the room with no complications.    A/P  This pleasant patient is likely having silent laryngopharyngeal reflux. Options including diet, medications were discussed. She will stick with an antireflux diet and be seen in the f/u.

## 2021-08-20 NOTE — NURSING NOTE
Anuradha Horn's goals for this visit include:   Chief Complaint   Patient presents with     Consult     Hoarsness, maybe due to thyroid nodule. Hoarsnerss comes and goers, having Bx today on thyoid.        She requests these members of her care team be copied on today's visit information: Evon    PCP: Laine Canas    Referring Provider:  David Barnard MD  [address] [place] [address]    /88   Pulse 63   LMP 04/15/2010   SpO2 100%     Do you need any medication refills at today's visit? no

## 2021-08-20 NOTE — LETTER
8/20/2021         RE: Anuradha Horn  [address]  [facility] [address]        Dear Colleague,    Thank you for referring your patient, Anuradha Horn, to the [facility]. Please see a copy of my visit note below.    HPI    This pleasant patient is having raspy voice from time to time for months. Denies any environmental allergies, cough, odynophagia, dysphagia, weight changes, fever, night sweat, post nasal drip or heartburn.     ENT Problem List  Migraine with aura and without status migrainosus, not intractable  Thyroid nodule  CARDIOVASCULAR SCREENING; LDL GOAL LESS THAN 160  Calculus of gallbladder without cholecystitis without obstruction  Atrophic vaginitis  Diminished ovarian reserve        Medications       alendronate (FOSAMAX) 70 MG tablet  Ascorbic Acid (VITAMIN C PO)  calcium citrate-vitamin D (CITRACAL W/D) 250-100 MG-UNIT tablet  estradiol (ESTRACE VAGINAL) 0.1 MG/GM vaginal cream  Multiple Vitamins-Minerals (DAILY MULTI PO)    US THYROID 7/2/2021 7:53 AM     COMPARISON: 8/6/2020, 11/14/2018, 11/7/2018     HISTORY: Thyroid nodule.  Previous benign right lobe thyroid nodule.     FINDINGS:   Thyroid parenchyma: homogenous  The right lobe of the thyroid measures: 6.0 x 1.4 x 2.4 cm  The left lobe of the thyroid measures: 4.4 x 1.3 x 1.5 cm  The thyroid isthmus measures: 0.6 cm in thickness     Nodule 1:  Lobe: Right  Location: Mid lobe  Size: 1.4 x 1.1 x 0.5 cm  Composition: Solid or almost completely solid (2 points)  Echogenicity: Hypoechoic (2 points)  Shape: Wider than tall (0 points)  Margin: Smooth (0 points)  Echogenic Foci: None or large comet tail artifact (0 points)  Stability: No significant change in size  TIRADS: TR4 (4-6 points)      Nodule 2:  Lobe: Right  Location: Inferior  Size: 4.0 x 3.9 x 2.2 cm  Composition: Solid or almost completely solid (2 points)  Echogenicity: Hypoechoic (2 points)  Shape: Wider than tall (0 points)  Margin:  Smooth (0 points)  Echogenic Foci: None or large comet tail artifact (0 points)  Stability: No significant change in size  TIRADS: TR4 (4-6 points)  - Previously benign by fine needle  aspiration in 2018.     Nodule 3:  Lobe: Isthmus  Location: Mid  Size: 1.0 x 0.7 x 0.4 cm  Composition: Solid or almost completely solid (2 points)  Echogenicity: Hyperechoic or isoechoic (1 point)  Shape: Wider than tall (0 points)  Margin: Ill-defined (0 points)  Echogenic Foci: None or large comet tail artifact (0 points)  Stability: No significant change in size  TIRADS: TR3 (3 points)      Nodule 4:  Lobe: Left  Location: Mid lobe  Size: 0.7 x 0.6 x 0.4 cm  Composition: Solid or almost completely solid (2 points)  Echogenicity: Hypoechoic (2 points)  Shape: Wider than tall (0 points)  Margin: Ill-defined (0 points)  Echogenic Foci: None or large comet tail artifact (0 points)  Stability: No significant change in size  TIRADS: TR4 (4-6 points)      Nodule 5:  Lobe: Left  Location: Inferior lobe  Size: 0.6 x 0.7 x 0.4 cm  Composition: Solid or almost completely solid (2 points)  Echogenicity: Hypoechoic (2 points)  Shape: Wider than tall (0 points)  Margin: Ill-defined (0 points)  Echogenic Foci: Punctate echogenic foci (3 points)  Stability: No significant change in size  TIRADS: TR4 (4-6 points)                                                                       Impression:  Bilateral thyroid nodules as above without significant  change since 2020.  Follow-up thyroid ultrasound in 1 year per  guidelines.     ACR TI-RADS recommendations  TR2 (2 points) & TR1 (0 points) -No FNA or follow-up  TR3 (3 points) - FNA if ? 2.5cm, follow-up if 1.5 -2.4 cm in 1, 3 and  5 years  TR4 (4-6 points) - FNA if ? 1.5cm, follow-up if 1 -1.4 cm in 1, 2, 3  and 5 years  TR5 (?7 points) - FNA if ? 1cm, follow-up if 0.5 -0.9 cm every year  for 5 years      Review of Systems   Constitutional: Negative.    HENT: Negative.    Eyes: Negative for blurred  vision, double vision and photophobia.   Respiratory: Negative for cough, hemoptysis and sputum production.    Gastrointestinal: Negative for heartburn, nausea and vomiting.   Skin: Negative.    Neurological: Negative for dizziness, tingling, tremors and headaches.   Endo/Heme/Allergies: Negative for environmental allergies.         Physical Exam  Vitals reviewed.   Constitutional:       Appearance: Normal appearance. She is normal weight.   HENT:      Head: Normocephalic and atraumatic.      Jaw: There is normal jaw occlusion.      Salivary Glands: Right salivary gland is not diffusely enlarged or tender. Left salivary gland is not diffusely enlarged or tender.      Right Ear: Hearing, tympanic membrane, ear canal and external ear normal. No decreased hearing noted. No middle ear effusion. There is no impacted cerumen.      Left Ear: Hearing, tympanic membrane, ear canal and external ear normal. No decreased hearing noted.  No middle ear effusion. There is no impacted cerumen.      Nose: Nose normal. No septal deviation, congestion or rhinorrhea.      Right Turbinates: Enlarged.      Left Turbinates: Enlarged.      Right Sinus: No maxillary sinus tenderness or frontal sinus tenderness.      Left Sinus: No maxillary sinus tenderness or frontal sinus tenderness.      Mouth/Throat:      Mouth: Mucous membranes are moist.      Pharynx: Oropharynx is clear. Uvula midline.   Eyes:      Extraocular Movements: Extraocular movements intact.      Pupils: Pupils are equal, round, and reactive to light.   Neurological:      Mental Status: She is alert.         Diagnostic nasal endoscopy:     She was seen in the room and identified. Pros and cons of the procedure were explained to the patient. The procedure and its alternatives were explained to the patient in lay terms. Her questions were answered. Her symptoms required a diagnostic endoscopic evaluation under local anesthesia. After obtaining an informed consent from the  patient, 3% Lidocaine with oxymethasoline spray was applied to each nostril. Then a flexible scopic exam was performed. Septum is in the midline. Ostiomeatal complexes are free of disease. No pus or polyp seen. Inferior turbinates are enlarged. Nasopharynx is normal. Rosenmüller fossa and torus tubarius are normal. Epiglottis, hypopharynx, false vocal folds are normal. No pooling in pyriform fossae. Vocal cords are mobile, post glottic edema and hyperemia otherwise normal. She tolerated the procedure well and left the room with no complications.    A/P  This pleasant patient is likely having silent laryngopharyngeal reflux. Options including diet, medications were discussed. She will stick with an antireflux diet and be seen in the f/u.        Again, thank you for allowing me to participate in the care of your patient.        Sincerely,        Erica Joy MD

## 2021-08-23 LAB
PATH REPORT.COMMENTS IMP SPEC: ABNORMAL
PATH REPORT.COMMENTS IMP SPEC: ABNORMAL
PATH REPORT.COMMENTS IMP SPEC: YES
PATH REPORT.COMMENTS IMP SPEC: YES
PATH REPORT.FINAL DX SPEC: ABNORMAL
PATH REPORT.GROSS SPEC: ABNORMAL
PATH REPORT.RELEVANT HX SPEC: ABNORMAL

## 2021-08-25 ENCOUNTER — TELEPHONE (OUTPATIENT)
Dept: ENDOCRINOLOGY | Facility: CLINIC | Age: 51
End: 2021-08-25

## 2021-08-25 DIAGNOSIS — D49.7 FOLLICULAR NEOPLASM OF THYROID: Primary | ICD-10-CM

## 2021-08-25 DIAGNOSIS — R89.9 ABNORMAL THYROID BIOPSY: ICD-10-CM

## 2021-08-25 NOTE — TELEPHONE ENCOUNTER
" \"Suspicious for a Follicular Neoplasm                The [place] implied risk of malignancy and recommended clinical management:  Suspicious for a follicular neoplasm has a 25-40% risk of malignancy, recommended management is surgical lobectomy or molecular testing.                  Adequacy:                 Satisfactory for evaluation        Specimen B                 Interpretation:                  Atypia of Undetermined Significance     The [place] implied risk of malignancy and recommended clinical management:  Atypia of undetermined significance has a 10-30% risk of malignancy, recommend repeat FNA in 4-6 weeks, molecular testing or lobectomy. \"    Order for afirma placed. Called pt multiple times to discuss this in detail however no one answered. WORLEY.   mychart message sent. Will attempt to reach patient again.     "

## 2021-08-26 NOTE — TELEPHONE ENCOUNTER
Per Patient is returning a call from Dr. Barnard. Patient is wanting to get a call back from Dr. Barnard today 8/26/2021 ASA. Patient states she is leaving to go out of town in an hour. Patient states she will be back on 08/31/2021 . Please advise.

## 2021-08-26 NOTE — TELEPHONE ENCOUNTER
Called patient discussed in-detail and results. Pt would prefer surgical removal as a next step. Referral to ENT placed. Please assist pt to schedule.

## 2021-08-26 NOTE — TELEPHONE ENCOUNTER
Writer received message from Dr. Barnard as follows:    I placed an AFIRMA order for this pt. Please call patho dept when able to make sure that they have the specimen. I tried to reach pt via phone however no one answered. I will try to call her tomorrow. Just an FYI.      Writer attempted to contact pathology department. Left voicemail requesting return call.       Ciera Whatley RN  Endocrine Care Coordinator  [facility]

## 2021-08-26 NOTE — TELEPHONE ENCOUNTER
"This writer attempted to reach pt again today; no one answered.   WORLEY. The following KXEN message sent.   \"  Anuradha,      I just left a message on your phone.     The thyroid biopsy results were abnormal.  1. One of the nodule biopsy result was in the category -suspicious for a follicular neoplasm. Follicular neoplasms can be benign or malignant and biopsy will not be able to differentiate between the two. When we find results \"suspicious for a follicular neoplasm\" the risk of malignancy can be as high as 40%.  Surgical removal is the next step; however before proceeding to surgical intervention I recommend additional testing on the specimen we have at hand (molecular testing).   #2 biopsy result of the second nodule also came back in a category known as AUS. This is a category when the pathologist is unable to tell whether findings are benign or malignant. This also needs additional follow-up with molecular testing. I will submit the molecular testing and will try to reach out to you again tomorrow morning to discuss further.  Please let me know if any questions in the meantime.  David Barnard MD            Patient Communication  Seen by patient Anuradha Horn on 8/26/2021 12:26 PM CDT     \"  Will schedule a follow up appointment for patient. Team please schedule a follow up with me in the next 2-3 weeks. Okay to overbook.   "

## 2021-08-27 NOTE — TELEPHONE ENCOUNTER
Writer attempted to contact patient to determine if she would prefer [facility] or [facility]. If [facility], referral will need to be changed to Surgical ([facility] it is under ENT). No answer. Left voicemail for patient to contact our office.       Ciera Whatley RN  Endocrine Care Coordinator  [facility]

## 2021-09-03 ENCOUNTER — TELEPHONE (OUTPATIENT)
Dept: ENDOCRINOLOGY | Facility: CLINIC | Age: 51
End: 2021-09-03

## 2021-09-03 NOTE — TELEPHONE ENCOUNTER
[facility] Call Center    Phone Message    May a detailed message be left on voicemail: yes     Reason for Call: Form or Letter   Type or form/letter needing completion: PA: Genetic Molecular Testing  Provider: Evon  Date form needed: whenever possible  Once completed: Fax form to: [phone]    Robi called regarding a form sent today via fax.  Please complete in its entirety and send back via fax.  Call with any questions: [phone]    Action Taken: Message routed to:  [facility] endo    Travel Screening: Not Applicable

## 2021-09-07 ENCOUNTER — TELEPHONE (OUTPATIENT)
Dept: ENDOCRINOLOGY | Facility: CLINIC | Age: 51
End: 2021-09-07

## 2021-09-07 NOTE — TELEPHONE ENCOUNTER
[facility] Call Center    Phone Message    May a detailed message be left on voicemail: yes     Reason for Call: Form or Letter   Type or form/letter needing completion: PA: Genetic Molecular Testing  Provider: Evon  Date form needed: whenever possible  Once completed: Fax form to: [phone]     Robi called back to confirm if the clinic received the form. Per chart note from Milag[facility] on 9/3/21 it was received, and faxed back. Writer confirmed that it had been sent back to the correct fax. Caller requested that the form please be faxed to them again since they didn't receive it the first time. Call with any questions: [phone]     Action Taken: Message routed to:  [facility] endo     Travel Screening: Not Applicable

## 2021-09-08 ENCOUNTER — TELEPHONE (OUTPATIENT)
Dept: ENDOCRINOLOGY | Facility: CLINIC | Age: 51
End: 2021-09-08

## 2021-09-08 NOTE — TELEPHONE ENCOUNTER
[facility] Call Center    Phone Message    May a detailed message be left on voicemail: yes     Reason for Call: Form or Letter   Type or form/letter needing completion: Genetic Molecular Testing Prior Authorization  Provider: Dr Barnard  Date form needed: as soon as possible please  Once completed: Fax form to: [phone]     Said he spoke to Susan about this already and re-faxed the form today in case you did not get it - but thought the form was already in the works being filled out and faxed back - he is following up on this and wants faxed back as soon as possible please    Call with any questions - Kalen [facility], Ph # [phone], Fax # [phone]    Thank you    I see a note it was faxed again yest afternoon to same Fax # for second time - can you please call Kalen to discuss as he said he did not receive it yet - Thanks      Action Taken: Message routed to:  [facility] ENDO    Travel Screening: Not Applicable

## 2021-09-09 NOTE — TELEPHONE ENCOUNTER
Writer reached out to [employer]. Form completed and faxed back to [employer].      Ciera Whatley RN  Endocrine Care Coordinator  [facility]

## 2021-09-10 LAB — SCANNED LAB RESULT: NORMAL

## 2021-09-17 ENCOUNTER — TELEPHONE (OUTPATIENT)
Dept: FAMILY MEDICINE | Facility: CLINIC | Age: 51
End: 2021-09-17

## 2021-09-17 NOTE — TELEPHONE ENCOUNTER
Patient Quality Outreach      Summary:    Patient has the following on her problem list/HM: None    Patient is due/failing the following:   Colonoscopy    Type of outreach:    Sent [employer] message.    Questions for provider review:    None                                                                                                                                     TOYIN Pereira.

## 2021-09-19 ENCOUNTER — HEALTH MAINTENANCE LETTER (OUTPATIENT)
Age: 51
End: 2021-09-19

## 2021-09-21 ENCOUNTER — LAB (OUTPATIENT)
Dept: LAB | Facility: CLINIC | Age: 51
End: 2021-09-21
Payer: COMMERCIAL

## 2021-09-21 DIAGNOSIS — Z11.59 ENCOUNTER FOR SCREENING FOR OTHER VIRAL DISEASES: ICD-10-CM

## 2021-09-21 PROCEDURE — U0003 INFECTIOUS AGENT DETECTION BY NUCLEIC ACID (DNA OR RNA); SEVERE ACUTE RESPIRATORY SYNDROME CORONAVIRUS 2 (SARS-COV-2) (CORONAVIRUS DISEASE [COVID-19]), AMPLIFIED PROBE TECHNIQUE, MAKING USE OF HIGH THROUGHPUT TECHNOLOGIES AS DESCRIBED BY CMS-2020-01-R: HCPCS

## 2021-09-21 PROCEDURE — U0005 INFEC AGEN DETEC AMPLI PROBE: HCPCS

## 2021-09-22 LAB — SARS-COV-2 RNA RESP QL NAA+PROBE: NEGATIVE

## 2021-09-23 ENCOUNTER — TELEPHONE (OUTPATIENT)
Dept: ENDOCRINOLOGY | Facility: CLINIC | Age: 51
End: 2021-09-23

## 2021-09-23 NOTE — RESULT ENCOUNTER NOTE
See telephone documentation from 9/23/21.     Anuradha,     I just left a message on your phone.  The Afirma test result for the 2 small nodules have come back benign( which is a desired finding].  When the Afirma result comes back as benign-risk of malignancy is about 4%.  I understand that you are planning to get the bigger nodule removed due to pressure symptoms.  However, prior to surgery I would like us to discuss regarding this small nodules and findings we have so far and how to proceed.  I have asked our team to reach out to you and schedule a follow-up telephone or virtual appointment on Monday, 9/27/2021.    Please let me know if any questions in the meantime.    David Barnard MD

## 2021-09-23 NOTE — TELEPHONE ENCOUNTER
Writer contacted patient to review. Patient is not able to do a virtual appointment with Dr. Barnard on Monday, 9/27, unless it is after 5pm. Patient notes that she could on Tuesday, 9/28/21 if after 1pm.    Writer will review with Dr. Barnard and contact patient with update. Patient is ok with detailed voicemail being left.       Ciera Whatley RN  Endocrine Care Coordinator  [facility]

## 2021-09-23 NOTE — TELEPHONE ENCOUNTER
Writer attempted to contact patient. No answer. Left voicemail for patient to contact our office.       Ciera Whatley RN  Endocrine Care Coordinator  [facility]

## 2021-09-23 NOTE — TELEPHONE ENCOUNTER
Patient returning call to Dr. Barnard not availible when transferred to back line please call back .

## 2021-09-23 NOTE — TELEPHONE ENCOUNTER
AFIRMA RESULTS which are scanned into the computer noted.   Nodule A measuring 1.4 cm - Afirma genomic sequencing -benign risk of malignancy about 4%-negative for BRAF.  Nodule b measuring 0.7 cm - Afirma genomic sequencing -benign risk of malignancy about 4%-negative for BRAF.    Nodule 1: Afirma  Lobe: Right  Location: Mid lobe  Size: 1.4 x 1.1 x 0.5 cm  Composition: Solid or almost completely solid (2 points)  Echogenicity: Hypoechoic (2 points)  Shape: Wider than tall (0 points)  Margin: Smooth (0 points)  Echogenic Foci: None or large comet tail artifact (0 points)  Stability: No significant change in size  TIRADS: TR4 (4-6 points)      Nodule 2:  Lobe: Right  Location: Inferior  Size: 4.0 x 3.9 x 2.2 cm  Composition: Solid or almost completely solid (2 points)  Echogenicity: Hypoechoic (2 points)  Shape: Wider than tall (0 points)  Margin: Smooth (0 points)  Echogenic Foci: None or large comet tail artifact (0 points)  Stability: No significant change in size  TIRADS: TR4 (4-6 points)  - Previously benign by fine needle  aspiration in 2018.     Nodule 3:  Lobe: Isthmus  Location: Mid  Size: 1.0 x 0.7 x 0.4 cm  Composition: Solid or almost completely solid (2 points)  Echogenicity: Hyperechoic or isoechoic (1 point)  Shape: Wider than tall (0 points)  Margin: Ill-defined (0 points)  Echogenic Foci: None or large comet tail artifact (0 points)  Stability: No significant change in size  TIRADS: TR3 (3 points)      Nodule 4:  Lobe: Left  Location: Mid lobe  Size: 0.7 x 0.6 x 0.4 cm  Composition: Solid or almost completely solid (2 points)  Echogenicity: Hypoechoic (2 points)  Shape: Wider than tall (0 points)  Margin: Ill-defined (0 points)  Echogenic Foci: None or large comet tail artifact (0 points)  Stability: No significant change in size  TIRADS: TR4 (4-6 points)      Nodule 5: Afirma]  Lobe: Left  Location: Inferior lobe  Size: 0.6 x 0.7 x 0.4 cm  Composition: Solid or almost completely  solid (2 points)  Echogenicity: Hypoechoic (2 points)  Shape: Wider than tall (0 points)  Margin: Ill-defined (0 points)  Echogenic Foci: Punctate echogenic foci (3 points)  Stability: No significant change in size  TIRADS: TR4 (4-6 points)         Specimen A                 Interpretation:                  Suspicious for a Follicular Neoplasm                The [place] implied risk of malignancy and recommended clinical management:  Suspicious for a follicular neoplasm has a 25-40% risk of malignancy, recommended management is surgical lobectomy or molecular testing.                  Adequacy:                 Satisfactory for evaluation        Specimen B                 Interpretation:                  Atypia of Undetermined Significance     The [place] implied risk of malignancy and recommended clinical management:  Atypia of undetermined significance has a 10-30% risk of malignancy, recommend repeat FNA in 4-6 weeks, molecular testing or lobectomy.                  Adequacy:                 Satisfactory for evaluation    Called pt on September 23, 2021. WORLEY.   Please schedule a follow up virtual visit on Monday 9/27/2021-okay to add on

## 2021-09-24 ENCOUNTER — HOSPITAL ENCOUNTER (OUTPATIENT)
Facility: AMBULATORY SURGERY CENTER | Age: 51
Discharge: HOME | End: 2021-09-24
Attending: SURGERY | Admitting: SURGERY
Payer: COMMERCIAL

## 2021-09-24 VITALS
HEART RATE: 68 BPM | DIASTOLIC BLOOD PRESSURE: 72 MMHG | RESPIRATION RATE: 16 BRPM | OXYGEN SATURATION: 96 % | SYSTOLIC BLOOD PRESSURE: 107 MMHG | TEMPERATURE: 97.4 F

## 2021-09-24 LAB — COLONOSCOPY: NORMAL

## 2021-09-24 PROCEDURE — G8918 PT W/O PREOP ORDER IV AB PRO: HCPCS

## 2021-09-24 PROCEDURE — 99152 MOD SED SAME PHYS/QHP 5/>YRS: CPT | Mod: 59 | Performed by: SURGERY

## 2021-09-24 PROCEDURE — 45385 COLONOSCOPY W/LESION REMOVAL: CPT | Mod: PT | Performed by: SURGERY

## 2021-09-24 PROCEDURE — 99153 MOD SED SAME PHYS/QHP EA: CPT | Mod: 59 | Performed by: SURGERY

## 2021-09-24 PROCEDURE — 45385 COLONOSCOPY W/LESION REMOVAL: CPT

## 2021-09-24 PROCEDURE — G8907 PT DOC NO EVENTS ON DISCHARG: HCPCS

## 2021-09-24 PROCEDURE — 88305 TISSUE EXAM BY PATHOLOGIST: CPT | Performed by: PATHOLOGY

## 2021-09-24 RX ORDER — FENTANYL CITRATE 50 UG/ML
INJECTION, SOLUTION INTRAMUSCULAR; INTRAVENOUS PRN
Status: DISCONTINUED | OUTPATIENT
Start: 2021-09-24 | End: 2021-09-24 | Stop reason: HOSPADM

## 2021-09-24 RX ORDER — ONDANSETRON 2 MG/ML
4 INJECTION INTRAMUSCULAR; INTRAVENOUS
Status: DISCONTINUED | OUTPATIENT
Start: 2021-09-24 | End: 2021-09-25 | Stop reason: HOSPADM

## 2021-09-24 RX ORDER — LIDOCAINE 40 MG/G
CREAM TOPICAL
Status: DISCONTINUED | OUTPATIENT
Start: 2021-09-24 | End: 2021-09-25 | Stop reason: HOSPADM

## 2021-09-24 NOTE — TELEPHONE ENCOUNTER
Ok per Dr. Barnard to add on Tuesday.    Writer contacted patient to review. Scheduled patient for follow-up 9/28/21 at 1:15pm.      Ciera Whatley RN  Endocrine Care Coordinator  [facility]

## 2021-09-28 ENCOUNTER — VIRTUAL VISIT (OUTPATIENT)
Dept: ENDOCRINOLOGY | Facility: CLINIC | Age: 51
End: 2021-09-28
Payer: COMMERCIAL

## 2021-09-28 DIAGNOSIS — R89.9 ABNORMAL THYROID BIOPSY: Primary | ICD-10-CM

## 2021-09-28 DIAGNOSIS — D49.7 FOLLICULAR NEOPLASM OF THYROID: ICD-10-CM

## 2021-09-28 DIAGNOSIS — E04.1 THYROID NODULE: ICD-10-CM

## 2021-09-28 LAB
PATH REPORT.COMMENTS IMP SPEC: NORMAL
PATH REPORT.COMMENTS IMP SPEC: NORMAL
PATH REPORT.FINAL DX SPEC: NORMAL
PATH REPORT.GROSS SPEC: NORMAL
PATH REPORT.MICROSCOPIC SPEC OTHER STN: NORMAL
PATH REPORT.RELEVANT HX SPEC: NORMAL
PHOTO IMAGE: NORMAL

## 2021-09-28 PROCEDURE — 99214 OFFICE O/P EST MOD 30 MIN: CPT | Mod: 95 | Performed by: INTERNAL MEDICINE

## 2021-09-28 NOTE — LETTER
9/28/2021         RE: Anuradha Horn  [address]  [facility] [address]        Dear Colleague,    Thank you for referring your patient, Anuradha Horn, to the [facility]. Please see a copy of my visit note below.    Anuradha is a 51 year old who is being evaluated via a billable video visit.      How would you like to obtain your AVS? MyChart  If the video visit is dropped, the invitation should be resent by: Text to cell phone: [phone]  Will anyone else be joining your video visit? No      Larry Tavera [facility]  Adult Endocrinology  [facility]      Endocrinology Clinic Visit    Chief Complaint: Endocrine Problem (Review Afirma results)     Information obtained from:Patient    Subjective:         HPI: Anuradha Horn is a 51 year old female here to discuss abnormal thyroid biopsy results.    Nodule 1:  Lobe: Right  Location: Mid lobe  Size: 1.4 x 1.1 x 0.5 cm  TIRADS: TR4 (4-6 points)      Nodule 2:  Lobe: Right  Location: Inferior  Size: 4.0 x 3.9 x 2.2 cm  TIRADS: TR4 (4-6 points)  - Previously benign by fine needle  aspiration in 2018.     Nodule 3:  Lobe: Isthmus  Location: Mid  Size: 1.0 x 0.7 x 0.4 cm  Composition: Solid or almost completely solid (2 points)  TIRADS: TR3 (3 points)      Nodule 4:  Lobe: Left  Location: Mid lobe  Size: 0.7 x 0.6 x 0.4 cm  TIRADS: TR4 (4-6 points)      Nodule 5:  Lobe: Left  Location: Inferior lobe  Size: 0.6 x 0.7 x 0.4 cm  TIRADS: TR4 (4-6 points)      Thyroid, right nodule #2, ultrasound guided fine needle aspiration        Thyroid, right nodule #2, ultrasound guided fine needle aspiration:     Benign   Consistent with a benign nodule (includes adenomatoid nodule, colloid   nodule, etc.)    No family history of thyroid cancer or radiation treatment as a child.  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     No fractures.     Patient decided to proceed with removal of 4 cm nodule on  the right side.  In preparation for the surgery; additional biopsies were performed.      Specimen A                 Interpretation:                  Suspicious for a Follicular Neoplasm                The [place] implied risk of malignancy and recommended clinical management:  Suspicious for a follicular neoplasm has a 25-40% risk of malignancy, recommended management is surgical lobectomy or molecular testing.                  Adequacy:                 Satisfactory for evaluation        Specimen B                 Interpretation:                  Atypia of Undetermined Significance     The [place] implied risk of malignancy and recommended clinical management:  Atypia of undetermined significance has a 10-30% risk of malignancy, recommend repeat FNA in 4-6 weeks, molecular testing or lobectomy.                  Adequacy:                 Satisfactory for evaluation         No Known Allergies    Current Outpatient Medications   Medication Sig Dispense Refill     alendronate (FOSAMAX) 70 MG tablet Take 1 tablet (70 mg) by mouth every 7 days Take 60 minutes before am meal with 8 oz. water. Remain upright for 30 minutes. 12 tablet 3     Ascorbic Acid (VITAMIN C PO)        calcium citrate-vitamin D (CITRACAL W/D) 250-100 MG-UNIT tablet Take 2 tablets by mouth 2 times daily 180 tablet 3     estradiol (ESTRACE VAGINAL) 0.1 MG/GM vaginal cream 1 gm PV Q HS x 2 weeks, then 2-3 x's weekly thereafter 42.5 g 3     Multiple Vitamins-Minerals (DAILY MULTI PO) Take 1 capsule by mouth daily         Review of Systems     as per HPI above    Objective:   LMP 04/15/2010   Constitutional: Pleasant no acute cardiopulmonary distress.   Psychological: appropriate mood and affect   In House Labs:       TSH   Date Value Ref Range Status   06/21/2021 1.56 0.40 - 4.00 mU/L Final   07/30/2020 1.20 0.40 - 4.00 mU/L Final   11/05/2018 1.39 0.40 - 4.00 mU/L Final   07/24/2014 1.03 0.4 - 5.0 mU/L Final       Creatinine   Date Value Ref Range  Status   09/24/2020 0.69 0.52 - 1.04 mg/dL Final   ]    Assessment/Treatment Plan:      Multiple thyroid nodules/hoarseness of voice/visible neck enlargement:  Prefers thyroidectomy and removal of the dominant nodule.  Referral to ENT arranged for thyroidectomy and further assessment of hoarseness of voice .  Given consideration for right-sided thyroidectomy and isthmectomy due to possible compressive symptoms and cosmetic reasons; we did a biopsy of the left-sided nodule measuring 0.7 cm with punctate echogenic foci and the right-sided nodule measuring 1.4 cm/hypoechoic which was not biopsied previously.     Results for these nodules came back as  AUS and follicular neoplasm respectively.  This was followed up with Afirma and Afirma results were benign for both nodules and no mutations noted on Afirma.  Discussed findings in detail with the patient.  Options of management including right thyroidectomy with isthmectomy for the aforementioned reasons discussed.  Patient is more inclined to proceed with total thyroidectomy. Risk of total thyroidectomy will be need for thyroid replacement therapy with levothyroxine.  She will further discuss with the thyroid surgeon.      Nodule 1:  Lobe: Right previously discussed documented below.  Location: Mid lobe  Size: 1.4 x 1.1 x 0.5 cm  TIRADS: TR4 (4-6 points)   Biopsy-follicular neoplasm  Afirma-benign     Nodule 2:  Lobe: Right  Location: Inferior  Size: 4.0 x 3.9 x 2.2 cm  TIRADS: TR4 (4-6 points)  -   Previously benign by fine needle  aspiration in 2018.     Nodule 3:  Lobe: Isthmus  Location: Mid  Size: 1.0 x 0.7 x 0.4 cm  Composition: Solid or almost completely solid (2 points)  TIRADS: TR3 (3 points)      Nodule 4:  Lobe: Left  Location: Mid lobe  Size: 0.7 x 0.6 x 0.4 cm  TIRADS: TR4 (4-6 points)      Nodule 5:  Lobe: Left  Location: Inferior lobe  Size: 0.6 x 0.7 x 0.4 cm  TIRADS: TR4 (4-6 points)   biopsy-AUS   Afirma-benign    Previously discussed documented  below.  Osteoporosis without current fracture -Z score and T-scores for lumbar spines involving L3-L4 -3.6 and -3.2 respectively.  Compared to previous study there is -15.6 deterioration in the spine bone density and similarly at the hip -9.1.  risk factor is early or premature menopause 10 years ago.  Plan    To identify additional risk factors checked parathyroid hormone, screening for celiac disease and other routine labs which were within the normal limits.  Vitamin D has been checked previously and within acceptable range.        Adequate calcium and vitamin D discussed about calcium 1200 mg daily from all sources and vitamin D 1000 IUs daily.        Strengthening exercises like walking and other strengthening exercises.      Continue alendronate at the same dose.      I will contact the patient with the test results.  Return to clinic in 12 months.      David Barnard MD  Staff Endocrinologist    Division of Endocrinology and Diabetes      Video-Visit Details    Type of service:  Video Visit  1st VideoStart: 09/28/2021 01:15 pm  Stop: 09/28/2021 01:40 pm  Originating Location (pt. Location): Home  Distant Location (provider location):  [facility]   Platform used for Video Visit: Tapit  30 minutes spent on the date of the encounter doing chart review, history and exam, documentation and further activities per the note.         Again, thank you for allowing me to participate in the care of your patient.        Sincerely,        David Barnard MD

## 2021-09-28 NOTE — PROGRESS NOTES
Endocrinology Clinic Visit    Chief Complaint: Endocrine Problem (Review Afirma results)     Information obtained from:Patient    Subjective:         HPI: Anuradha Horn is a 51 year old female here to discuss abnormal thyroid biopsy results.    Nodule 1:  Lobe: Right  Location: Mid lobe  Size: 1.4 x 1.1 x 0.5 cm  TIRADS: TR4 (4-6 points)      Nodule 2:  Lobe: Right  Location: Inferior  Size: 4.0 x 3.9 x 2.2 cm  TIRADS: TR4 (4-6 points)  - Previously benign by fine needle  aspiration in 2018.     Nodule 3:  Lobe: Isthmus  Location: Mid  Size: 1.0 x 0.7 x 0.4 cm  Composition: Solid or almost completely solid (2 points)  TIRADS: TR3 (3 points)      Nodule 4:  Lobe: Left  Location: Mid lobe  Size: 0.7 x 0.6 x 0.4 cm  TIRADS: TR4 (4-6 points)      Nodule 5:  Lobe: Left  Location: Inferior lobe  Size: 0.6 x 0.7 x 0.4 cm  TIRADS: TR4 (4-6 points)      Thyroid, right nodule #2, ultrasound guided fine needle aspiration        Thyroid, right nodule #2, ultrasound guided fine needle aspiration:     Benign   Consistent with a benign nodule (includes adenomatoid nodule, colloid   nodule, etc.)    No family history of thyroid cancer or radiation treatment as a child.  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     No fractures.     Patient decided to proceed with removal of 4 cm nodule on the right side.  In preparation for the surgery; additional biopsies were performed.      Specimen A                 Interpretation:                  Suspicious for a Follicular Neoplasm                The [place] implied risk of malignancy and recommended clinical management:  Suspicious for a follicular neoplasm has a 25-40% risk of malignancy, recommended management is surgical lobectomy or molecular testing.                  Adequacy:                 Satisfactory for evaluation        Specimen B                 Interpretation:                  Atypia of Undetermined Significance     The [place] implied risk of  malignancy and recommended clinical management:  Atypia of undetermined significance has a 10-30% risk of malignancy, recommend repeat FNA in 4-6 weeks, molecular testing or lobectomy.                  Adequacy:                 Satisfactory for evaluation         No Known Allergies    Current Outpatient Medications   Medication Sig Dispense Refill     alendronate (FOSAMAX) 70 MG tablet Take 1 tablet (70 mg) by mouth every 7 days Take 60 minutes before am meal with 8 oz. water. Remain upright for 30 minutes. 12 tablet 3     Ascorbic Acid (VITAMIN C PO)        calcium citrate-vitamin D (CITRACAL W/D) 250-100 MG-UNIT tablet Take 2 tablets by mouth 2 times daily 180 tablet 3     estradiol (ESTRACE VAGINAL) 0.1 MG/GM vaginal cream 1 gm PV Q HS x 2 weeks, then 2-3 x's weekly thereafter 42.5 g 3     Multiple Vitamins-Minerals (DAILY MULTI PO) Take 1 capsule by mouth daily         Review of Systems     as per HPI above    Objective:   LMP 04/15/2010   Constitutional: Pleasant no acute cardiopulmonary distress.   Psychological: appropriate mood and affect   In House Labs:       TSH   Date Value Ref Range Status   06/21/2021 1.56 0.40 - 4.00 mU/L Final   07/30/2020 1.20 0.40 - 4.00 mU/L Final   11/05/2018 1.39 0.40 - 4.00 mU/L Final   07/24/2014 1.03 0.4 - 5.0 mU/L Final       Creatinine   Date Value Ref Range Status   09/24/2020 0.69 0.52 - 1.04 mg/dL Final   ]    Assessment/Treatment Plan:      Multiple thyroid nodules/hoarseness of voice/visible neck enlargement:  Prefers thyroidectomy and removal of the dominant nodule.  Referral to ENT arranged for thyroidectomy and further assessment of hoarseness of voice .  Given consideration for right-sided thyroidectomy and isthmectomy due to possible compressive symptoms and cosmetic reasons; we did a biopsy of the left-sided nodule measuring 0.7 cm with punctate echogenic foci and the right-sided nodule measuring 1.4 cm/hypoechoic which was not biopsied previously.     Results  for these nodules came back as  AUS and follicular neoplasm respectively.  This was followed up with Afirma and Afirma results were benign for both nodules and no mutations noted on Afirma.  Discussed findings in detail with the patient.  Options of management including right thyroidectomy with isthmectomy for the aforementioned reasons discussed.  Patient is more inclined to proceed with total thyroidectomy. Risk of total thyroidectomy will be need for thyroid replacement therapy with levothyroxine.  She will further discuss with the thyroid surgeon.      Nodule 1:  Lobe: Right previously discussed documented below.  Location: Mid lobe  Size: 1.4 x 1.1 x 0.5 cm  TIRADS: TR4 (4-6 points)   Biopsy-follicular neoplasm  Afirma-benign     Nodule 2:  Lobe: Right  Location: Inferior  Size: 4.0 x 3.9 x 2.2 cm  TIRADS: TR4 (4-6 points)  -   Previously benign by fine needle  aspiration in 2018.     Nodule 3:  Lobe: Isthmus  Location: Mid  Size: 1.0 x 0.7 x 0.4 cm  Composition: Solid or almost completely solid (2 points)  TIRADS: TR3 (3 points)      Nodule 4:  Lobe: Left  Location: Mid lobe  Size: 0.7 x 0.6 x 0.4 cm  TIRADS: TR4 (4-6 points)      Nodule 5:  Lobe: Left  Location: Inferior lobe  Size: 0.6 x 0.7 x 0.4 cm  TIRADS: TR4 (4-6 points)   biopsy-AUS   Afirma-benign    Previously discussed documented below.  Osteoporosis without current fracture -Z score and T-scores for lumbar spines involving L3-L4 -3.6 and -3.2 respectively.  Compared to previous study there is -15.6 deterioration in the spine bone density and similarly at the hip -9.1.  risk factor is early or premature menopause 10 years ago.  Plan    To identify additional risk factors checked parathyroid hormone, screening for celiac disease and other routine labs which were within the normal limits.  Vitamin D has been checked previously and within acceptable range.        Adequate calcium and vitamin D discussed about calcium 1200 mg daily from all sources and  vitamin D 1000 IUs daily.        Strengthening exercises like walking and other strengthening exercises.      Continue alendronate at the same dose.      I will contact the patient with the test results.  Return to clinic in 12 months.      David Barnard MD  Staff Endocrinologist    Division of Endocrinology and Diabetes      Video-Visit Details    Type of service:  Video Visit  1st VideoStart: 09/28/2021 01:15 pm  Stop: 09/28/2021 01:40 pm  Originating Location (pt. Location): Home  Distant Location (provider location):  [facility]   Platform used for Video Visit: Zursh  30 minutes spent on the date of the encounter doing chart review, history and exam, documentation and further activities per the note.

## 2021-09-28 NOTE — PROGRESS NOTES
Anuradha is a 51 year old who is being evaluated via a billable video visit.      How would you like to obtain your AVS? MyChart  If the video visit is dropped, the invitation should be resent by: Text to cell phone: [phone]  Will anyone else be joining your video visit? Paradise Tavera [facility]  Adult Endocrinology  [facility]

## 2021-09-30 ENCOUNTER — OFFICE VISIT (OUTPATIENT)
Dept: SURGERY | Facility: CLINIC | Age: 51
End: 2021-09-30
Attending: INTERNAL MEDICINE
Payer: COMMERCIAL

## 2021-09-30 DIAGNOSIS — D49.7 FOLLICULAR NEOPLASM OF THYROID: ICD-10-CM

## 2021-09-30 DIAGNOSIS — R89.9 ABNORMAL THYROID BIOPSY: ICD-10-CM

## 2021-09-30 PROCEDURE — 99202 OFFICE O/P NEW SF 15 MIN: CPT | Performed by: SURGERY

## 2021-09-30 NOTE — NURSING NOTE
Anuradha Horn's goals for this visit include:   Chief Complaint   Patient presents with     New Patient     Follicular neoplasm of thyroid// abnormal thyroid biopsy       She requests these members of her care team be copied on today's visit information: yes      PCP: Laine Canas    Referring Provider:  David Barnard MD  [address] [place] [address]    [facility] 04/15/2010     Do you need any medication refills at today's visit? No  Barrington Edouard CMA

## 2021-09-30 NOTE — LETTER
9/30/2021         RE: Anuradha Horn  [address]  [facility] [address]        Dear Colleague,    Thank you for referring your patient, Anuradha Horn, to the [facility]. Please see a copy of my visit note below.    SURGERY CLINIC CONSULTATION    REASON FOR CONSULTATION:  Anuradha Horn was referred by Dr. Barnard for evaluation and discussion of treatment options for thyroid nodule     HISTORY OF PRESENT ILLNESS:  Anuradha Horn is a 51 year old female who has a h/o MNG. Nodules followed with US. Most recent US commented on minimal change in the thyroid nodules. However because of size and TR score FNAs were performed. Left was AUS, right nodule follicular neoplasm. Afirma nodule right and left-no mutations detected.     Regarding the thyroid nodules, the patient denies any problems with voice quality, breathing or swallowing. No sx of hypo or hyperthyroidism. No previous HN XRT or previous PTC.       REVIEW OF SYSTEMS:  ROS EXAM: 10pt ROS pertinent for that noted in HPI  Patient Active Problem List   Diagnosis     CARDIOVASCULAR SCREENING; LDL GOAL LESS THAN 160     Migraine with aura and without status migrainosus, not intractable     Calculus of gallbladder without cholecystitis without obstruction     Atrophic vaginitis     Thyroid nodule     Diminished ovarian reserve       Past Surgical History:   Procedure Laterality Date     COLONOSCOPY WITH CO2 INSUFFLATION N/A 9/24/2021    Procedure: COLONOSCOPY, WITH CO2 INSUFFLATION;  Surgeon: Pedro Luis Newby DO;  Location: [facility] OR     D & C      following artifiscial insemination     SURGICAL HISTORY OF -   age 14    right hip slipped epiphysis       No Known Allergies    Medications reviewed in the EMR        Family History   Problem Relation Age of Onset     Hypertension Father      Thyroid Disease Mother      Hypertension Mother      Breast Cancer Mother 75     Hypertension Paternal Grandmother       Asthma No family hx of      C.A.D. No family hx of      Diabetes No family hx of      Cerebrovascular Disease No family hx of      Cancer - colorectal No family hx of      Prostate Cancer No family hx of        PHYSICAL EXAM:  LMP 04/15/2010     Neck: thyroid gland is 1.5 times size normal bilaterally. Bilateral thyroid nodules are soft. TGrachea is midline. No cervical lymphadenopathy.    I personally reviewed the radiographic images and laboratory data.    ASSESSMENT:   1. Follicular neoplasm of thyroid    2. Abnormal thyroid biopsy        PLAN:   Patient has bilateral thyroid nodules with no mutations on  Nodules. Most concerning nodule is right side. We could do right thyroid lobectomy possible total thyroidectomy. The procedure and risks were discussed with the patient. Will schedule surgery.    Nirali Genao MD                Again, thank you for allowing me to participate in the care of your patient.        Sincerely,        Nirali Genao MD

## 2021-09-30 NOTE — PROGRESS NOTES
SURGERY CLINIC CONSULTATION    REASON FOR CONSULTATION:  Anuradha Horn was referred by Dr. Barnard for evaluation and discussion of treatment options for thyroid nodule     HISTORY OF PRESENT ILLNESS:  Anuradha Horn is a 51 year old female who has a h/o MNG. Nodules followed with US. Most recent US commented on minimal change in the thyroid nodules. However because of size and TR score FNAs were performed. Left was AUS, right nodule follicular neoplasm. Afirma nodule right and left-no mutations detected.     Regarding the thyroid nodules, the patient denies any problems with voice quality, breathing or swallowing. No sx of hypo or hyperthyroidism. No previous HN XRT or previous PTC.       REVIEW OF SYSTEMS:  ROS EXAM: 10pt ROS pertinent for that noted in HPI  Patient Active Problem List   Diagnosis     CARDIOVASCULAR SCREENING; LDL GOAL LESS THAN 160     Migraine with aura and without status migrainosus, not intractable     Calculus of gallbladder without cholecystitis without obstruction     Atrophic vaginitis     Thyroid nodule     Diminished ovarian reserve       Past Surgical History:   Procedure Laterality Date     COLONOSCOPY WITH CO2 INSUFFLATION N/A 9/24/2021    Procedure: COLONOSCOPY, WITH CO2 INSUFFLATION;  Surgeon: Pedro Luis Newby DO;  Location: [facility] OR     D & C      following artifiscial insemination     SURGICAL HISTORY OF -   age 14    right hip slipped epiphysis       No Known Allergies    Medications reviewed in the EMR        Family History   Problem Relation Age of Onset     Hypertension Father      Thyroid Disease Mother      Hypertension Mother      Breast Cancer Mother 75     Hypertension Paternal Grandmother      Asthma No family hx of      C.A.D. No family hx of      Diabetes No family hx of      Cerebrovascular Disease No family hx of      Cancer - colorectal No family hx of      Prostate Cancer No family hx of        PHYSICAL EXAM:  LMP 04/15/2010     Neck: thyroid  gland is 1.5 times size normal bilaterally. Bilateral thyroid nodules are soft. TGrachea is midline. No cervical lymphadenopathy.    I personally reviewed the radiographic images and laboratory data.    ASSESSMENT:   1. Follicular neoplasm of thyroid    2. Abnormal thyroid biopsy        PLAN:   Patient has bilateral thyroid nodules with no mutations on  Nodules. Most concerning nodule is right side. We could do right thyroid lobectomy possible total thyroidectomy. The procedure and risks were discussed with the patient. Will schedule surgery.    Nirali Genao MD

## 2021-09-30 NOTE — PATIENT INSTRUCTIONS
Surgery Instructions    Always follow your surgeon s instructions. If you don t, your surgery could be cancelled. Please use the following checklist.  Your surgery is on: The surgery scheduler will contact you within 1 week of your consult with the surgeon. If you do not hear from them, please call the clinic or RN Care Coordinator for your provider.    Time: Prearrival times can vary depending on location/type of surgery.  [place] - 2 hour pre-arrival  [facility] [place] - 2 hour pre-arrival  [place] - 1 hour pre-arrival    Note:  These times may change. A nurse will call you before surgery to confirm. If you have not received a call or if you have more questions, please call us on the working day before your surgery:  ? [place]: [phone] or [phone] (9am to 5:30pm)  ? [facility] [place]: [phone] (8am to 6pm)  ? [place]: [phone] (9am to 5pm)  ? [facility] [phone] (7am to 4pm)  Prior to surgery  ? Have a pre-op physical exam with your Primary Doctor within 30 days of surgery  - Ask your doctor to send all of your results to the surgery center/hospital before surgery. Your doctor also may ask you to bring the results with you on the day of surgery.  - Tell your doctor if:  - You are allergic to latex or rubber (latex and rubber gloves are often used in medical care).  - You are taking any medicines (including aspirin), vitamins, or herbal products. You may need to stop taking some medicines before surgery.  - You have any medical problems (allergies, diabetes, or heart disease, for example).  - You have a pacemaker or an AICD (automatic implanted cardiac defibrillator). If you do, please bring the ID card with you on the day of surgery.  - People who smoke have a higher risk of infection after surgery. Ask your doctor how you can quit smoking.  - If you Primary Doctor is not within the [employer] system, you will need to have your pre-op physical faxed to us to be scanned into your  chart.  - [place]: [phone] or [phone]  - [facility] ([place]): [phone]  - [facility] [place] [phone]  ? Call your insurance company. Ask if you need pre-approval for your surgery. If you do not have insurance, please let us know. If you wish to speak to the , please alert the clinic staff so this can be arranged.  ? Arrange for someone to drive you home after surgery.  will need to be a responsible adult (18 years or older) that will provide transportation to and from surgery and stay in the waiting room during your surgery. You may not drive yourself or take public transportation to and from surgery.  ? Arrange for someone to stay with you for 24 hours after you go home. This person must be a responsible adult (18 years or older).  ? Call your surgeon or their nurse if there is any change in your health (cold, flu, infections, hospitalizations).  ? Do not smoke, drink alcohol, or take over-the-counter medicine for 24 hours before and after surgery.  ? If you take prescribed drugs, you may need to stop them until after the surgery.  Discuss what medications to take or not take prior to surgery with your Primary Doctor at your pre-op physical. Avoid over-the-counter blood-thinning medications such as Aspirin, Ibuprofen, vitamin E, or fish oil 7 days prior to surgery (unless otherwise directed by your Primary Doctor). Tylenol is a good alternative for mild pain relief prior to surgery.  ? Eating and drinking guidelines prior to surgery:  - Stop all solid food consumption 8 hours prior to surgery  - You may drink clear liquids up to 2 hours prior to surgery (water, fruit juices without pulp, jello, tea/coffee without creamer, sports drinks, clear-fat free broth (bouillon or consomme), popsicles (without milk, bits of fruit, or seeds/nuts)  ? Follow instructions given for showering or bathing before surgery.    - Use 8 ounces of antiseptic surgical soap,  like:  - Hibiclens, Scrub Care, or Exidine  - You can find it at your local pharmacy, clinic, or retail store. If you have trouble, ask your pharmacist to help you find the right substitute.  - Please wash with one of the above soaps twice before coming to the hospital for your surgery. This will decrease bacteria (germs) on your skin. It will also help reduce your chance of infection after surgery.  - Items you will need for showering:  - 4 newly washed washcloths  - 2 newly washed towels  - 8 ounces of one of the above soaps  - Following these instructions:  - The evening before surgery: Shower or bathe as you normally would, using your regular soap and a clean washcloth. Give special attention to places where your incision (surgical cut) or catheters will be. This includes your groin area. Rinse well. You may wash your hair with your regular shampoo. Next, wash your body with 4 ounces of the antiseptic soap. Use a clean, damp washcloth and gently clean your body (from the chin down). If your surgery involves your head, use the special soap on your head and scalp. Rinse well and dry off using a newly washed towel.  - The morning of surgery: Repeat the same process as the evening shower.  - Other suggestions:    Do not shave within 12 inches of your incision (surgical cut) area for at least 3 days before surgery. Shaving can make small cuts in the skin. This puts you at higher risk of infection.    Wear freshly washed pajamas or clothing after your evening shower.    Wear freshly washed clothes the day of surgery.    Wash and change your bed sheets the day before surgery to have clean bed sheets after your shower and when you get home from surgery.    If you have trouble washing all areas, make sure someone helps you.    Don't use any deodorant, lotion or powder after your shower.    Women who are menstruating should wear a fresh sanitary pad to the hospital.  ? Do not wear or add deodorant, cologne, lotion,  makeup, nail polish or jewelry to surgery. If you wear fake nails, please remove at least one nail before coming to surgery (an oxygen monitor needs to be placed on your finger during surgery).  ? Bring these items to the surgery center/hospital:  - Insurance card  - Money for parking and co-pays, if needed  - A list of all the medicines you take. Include vitamins, minerals, herbs, and over-the-counter drugs.  Note any drug allergies.  - A copy of your advance health care directive, if you have one. This tells us what treatment you would want--and who would make health care decisions--if you could no longer speak for yourself. You may request this form in advance or download it from [email or web address].  - A case for glasses, contact lenses, hearing aids, or dentures.  - Your inhaler or CPAP machine, if you use these at home.  ? Leave extra cash, jewelry, and other valuables at home.  When you arrive  When you get to the surgery center/hospital, you will:  ? Check in. If you are under age 18, you must be with a parent or legal guardian.  ? Sign consent forms, if you haven t already. These forms state that you know the risks and benefits of surgery. When you sign the forms, you give us permission to do the surgery. Do not sign them unless you understand what will happen during and after your surgery. If you have any questions about your surgery, ask to speak with your doctor before you sign the forms. If you don t understand the answers, ask again.  ? Receive a copy of the Patient s Bill of Rights. If you do not receive a copy, please ask for one.  ? Change into hospital clothes. Your belongings will be placed in a bag. We will return them to you after surgery.  ? Meet with the anesthesia provider. He or she will tell you what kind of anesthesia (medicine) will be used to keep you comfortable during surgery.  Remember: it s okay to remind doctors and nurses to wash their hands before touching you.  In most  cases, your surgeon will use a marker to write his or her initials on the surgery site. This ensures that the exact site is operated on.  For safety reasons, we will ask you the same questions many times. For example, we may ask your name and birth date over and over again.  Friends and family can stay with you until it s time for surgery. While you re in surgery, they will be in the waiting area. Please note that cell phones are not allowed in some patient care areas.  If you have questions about what will happen in the operating room, talk to your care team.  After surgery  We will move you to a recovery room, where we will watch you closely. If you have any pain or discomfort, tell your nurse. He or she will try to make you comfortable.  If you are staying overnight, we will move you to your hospital room after you are awake.  If you are going home, we will move you to another room. Friends and family may be able to join you. The length of time you spend in recovery depend on the type of medicine you received, your medical condition, the type of surgery you had, or your response to the anesthesia given during your procedure.  When you are discharged from the recovery room, the nurses will review instructions with you and your caregiver.  ? Please wash your hands every time you touch the wound or change bandages or dressings.  ? Do not submerge the wound in water.  You may not use a bathtub or hot tub until the wound is closed. The wait time frame is generally 2-3 weeks, but any open area can be a source of incoming bacteria, so it is better to be on the safe side and avoid water submersion until your wound is fully healed.  ? You may take a shower 24 hours after surgery. Double check with your surgeon if it is OK for water to run over the wound, whether it has been sutured, stapled, glued, or is open. You may gently wash the wound using the antiseptic soap provided for your pre-surgery showering (do not use a  washcloth). Any mild soap will work as well.  ? Many surgical wounds will have small white strips of tape on them called steri-strips.  Do not remove these. The edges will curl and fall off within 7-10 days with normal showering.  ? If you are going home with sutures (stitches) or staples, you must return to the clinic to have them taken out, usually within 1-2 weeks. Some stitches are dissolvable and do not require removal. Make sure to clarify with your surgeon or surgery nurse reviewing discharge paperwork what kind of sutures you have.  ? Signs and symptoms of infection include:  - Fever, temperature over 101.5   F  - Redness  - Swelling  - Increased pain  - Green or yellow drainage which may or may not have a foul odor  Dealing with pain  A nurse will check your comfort level often during your stay. He or she will work with you to manage your pain.  Remember:  ? All pain is real. There are many ways to control pain. We can help you decide what works best for you.  ? Ask for pain medicine when you need it. Don t try to  tough it out --this can make you feel worse. Always take your medicine as ordered.  ? Medicine doesn t work the same for everyone. If your medicine isn t working, tell your nurse. There may be other medicines or treatments we can try.  Going home  We will let you know when you re ready to leave the surgery center or hospital. Before you leave, we will tell you how to care for yourself at home and prevent infections. If you do not understand something, please say so. We will answer any questions you have. We will then help you get ready to leave.  Remember, you must have a responsible adult (18 years or older) to stay with you 24 hours after you leave the hospital.  Take it easy when you get home. You will need some time to recover--you may be more tired than you realize at first. Rest and relax for at least the first 24 hours at home. You ll feel better and heal faster if you take good care of  yourself.  Follow the discharge instructions that are given to you when you leave the surgery center or hospital  Please call the clinic if you experience any problems during regular clinic hours (Monday-Friday 8:00am-5:00pm).  If you experience problems during non-clinic hours, please call the [facility] on-call line at [phone] and ask the  to page the on-call Provider for your specialty. The on-call Provider will call you back and can triage your symptoms and further advise. If you are having an emergency, always call 911 or seek immediate evaluation at the Emergency Room.  Locations  [facility] ([facility] [place])  [address] [place] [id number]  [phone]   [email or web address]

## 2021-11-02 ENCOUNTER — TELEPHONE (OUTPATIENT)
Dept: SURGERY | Facility: CLINIC | Age: 51
End: 2021-11-02
Payer: COMMERCIAL

## 2021-11-02 NOTE — TELEPHONE ENCOUNTER
[facility] Call Center    Phone Message    May a detailed message be left on voicemail: yes     Reason for Call: The patient stated she was supposed to receive a call to schedule a Thyroid Surgery but has not heard from a  yet.  Per Alicia there are no orders placed.  Please advise. Thank you.     Action Taken: Message routed to:  Adult Clinics: Surgery, General p [phone]    Travel Screening: Not Applicable

## 2021-12-16 ENCOUNTER — PREP FOR PROCEDURE (OUTPATIENT)
Dept: OTOLARYNGOLOGY | Facility: CLINIC | Age: 51
End: 2021-12-16
Payer: COMMERCIAL

## 2021-12-16 DIAGNOSIS — E04.1 THYROID NODULE: Primary | ICD-10-CM

## 2021-12-16 RX ORDER — DEXAMETHASONE SODIUM PHOSPHATE 4 MG/ML
10 INJECTION, SOLUTION INTRA-ARTICULAR; INTRALESIONAL; INTRAMUSCULAR; INTRAVENOUS; SOFT TISSUE ONCE
Status: CANCELLED | OUTPATIENT
Start: 2021-12-16 | End: 2021-12-16

## 2021-12-20 NOTE — TELEPHONE ENCOUNTER
Left message regarding scheduling surgery/procedure with Dr. Genao.   Writer left call back number on the patients voicemail.    Veda Walker on 12/20/2021 at 1:22 PM   P: [phone]

## 2022-01-19 ENCOUNTER — TELEPHONE (OUTPATIENT)
Dept: SURGERY | Facility: CLINIC | Age: 52
End: 2022-01-19
Payer: COMMERCIAL

## 2022-01-19 NOTE — TELEPHONE ENCOUNTER
[facility] for patient to schedule surgery    Anna C. Schoenecker on 1/19/2022 at 3:20 PM

## 2022-01-20 ENCOUNTER — HOSPITAL ENCOUNTER (OUTPATIENT)
Facility: AMBULATORY SURGERY CENTER | Age: 52
End: 2022-01-20
Attending: SURGERY
Payer: COMMERCIAL

## 2022-01-20 PROBLEM — E04.1 THYROID NODULE: Status: ACTIVE | Noted: 2018-11-08

## 2022-01-20 NOTE — TELEPHONE ENCOUNTER
Patient called back to schedule surgery with Dr. Genao. Patient was hoping for end of March, but informed patient Dr. Genao was out. Patient scheduled for 4/12/2022 at the [facility].     Aware pre-op within 30 days and covid19 test was scheduled in [place] on 4/8/2022.     Aware of 2 week post op appointment scheduled in .     Additionally, reviewed visitor delphine and knows pre-op RN will call 2-3 days prior to surgery.     Surgical packet mailed for patient to review.     Veda Walker on 1/20/2022 at 12:40 PM

## 2022-02-26 DIAGNOSIS — Z11.59 ENCOUNTER FOR SCREENING FOR OTHER VIRAL DISEASES: Primary | ICD-10-CM

## 2022-03-28 ENCOUNTER — OFFICE VISIT (OUTPATIENT)
Dept: FAMILY MEDICINE | Facility: CLINIC | Age: 52
End: 2022-03-28
Payer: COMMERCIAL

## 2022-03-28 VITALS
BODY MASS INDEX: 28.14 KG/M2 | RESPIRATION RATE: 16 BRPM | HEART RATE: 61 BPM | SYSTOLIC BLOOD PRESSURE: 110 MMHG | OXYGEN SATURATION: 97 % | DIASTOLIC BLOOD PRESSURE: 70 MMHG | WEIGHT: 190 LBS | HEIGHT: 69 IN | TEMPERATURE: 98.4 F

## 2022-03-28 DIAGNOSIS — E04.1 THYROID NODULE: ICD-10-CM

## 2022-03-28 DIAGNOSIS — Z01.818 PREOP GENERAL PHYSICAL EXAM: Primary | ICD-10-CM

## 2022-03-28 PROCEDURE — 99214 OFFICE O/P EST MOD 30 MIN: CPT | Performed by: INTERNAL MEDICINE

## 2022-03-28 ASSESSMENT — PAIN SCALES - GENERAL: PAINLEVEL: NO PAIN (0)

## 2022-03-28 NOTE — PATIENT INSTRUCTIONS
Patient Education   Preparing for Your Surgery  Getting started  A nurse will call you to review your health history and instructions. They will give you an arrival time based on your scheduled surgery time. Please be ready to share:    Your doctor's clinic name and phone number    Your medical, surgical and anesthesia history    A list of allergies and sensitivities    A list of medicines, including herbal treatments and over-the-counter drugs    Whether the patient has a legal guardian (ask how to send us the papers in advance)  Please tell us if you're pregnant--or if there's any chance you might be pregnant. Some surgeries may injure a fetus (unborn baby), so they require a pregnancy test. Surgeries that are safe for a fetus don't always need a test, and you can choose whether to have one.   If you have a child who's having surgery, please ask for a copy of Preparing for Your Child's Surgery.    Preparing for surgery    Within 30 days of surgery: Have a pre-op exam (sometimes called an H&P, or History and Physical). This can be done at a clinic or pre-operative center.  ? If you're having a , you may not need this exam. Talk to your care team.    At your pre-op exam, talk to your care team about all medicines you take. If you need to stop any medicines before surgery, ask when to start taking them again.  ? We do this for your safety. Many medicines can make you bleed too much during surgery. Some change how well surgery (anesthesia) drugs work.    Call your insurance company to let them know you're having surgery. (If you don't have insurance, call [phone].)    Call your clinic if there's any change in your health. This includes signs of a cold or flu (sore throat, runny nose, cough, rash, fever). It also includes a scrape or scratch near the surgery site.    If you have questions on the day of surgery, call your hospital or surgery center.  COVID testing  You may need to be tested for COVID-19  before having surgery. If so, your surgical team will give you instructions for scheduling this test, separate from your preoperative history and physical.  Eating and drinking guidelines  For your safety: Unless your surgeon tells you otherwise, follow the guidelines below.    Eat and drink as usual until 8 hours before surgery. After that, no food or milk.    Drink clear liquids until 2 hours before surgery. These are liquids you can see through, like water, Gatorade and Propel Water. You may also have black coffee and tea (no cream or milk).    Nothing by mouth within 2 hours of surgery. This includes gum, candy and breath mints.    If you drink alcohol: Stop drinking it the night before surgery.    If your care team tells you to take medicine on the morning of surgery, it's okay to take it with a sip of water.  Preventing infection    Shower or bathe the night before and morning of your surgery. Follow the instructions your clinic gave you. (If no instructions, use regular soap.)    Don't shave or clip hair near your surgery site. We'll remove the hair if needed.    Don't smoke or vape the morning of surgery. You may chew nicotine gum up to 2 hours before surgery. A nicotine patch is okay.  ? Note: Some surgeries require you to completely quit smoking and nicotine. Check with your surgeon.    Your care team will make every effort to keep you safe from infection. We will:  ? Clean our hands often with soap and water (or an alcohol-based hand rub).  ? Clean the skin at your surgery site with a special soap that kills germs.  ? Give you a special gown to keep you warm. (Cold raises the risk of infection.)  ? Wear special hair covers, masks, gowns and gloves during surgery.  ? Give antibiotic medicine, if prescribed. Not all surgeries need antibiotics.  What to bring on the day of surgery    Photo ID and insurance card    Copy of your health care directive, if you have one    Glasses and hearing aides (bring  cases)  ? You can't wear contacts during surgery    Inhaler and eye drops, if you use them (tell us about these when you arrive)    CPAP machine or breathing device, if you use them    A few personal items, if spending the night    If you have . . .  ? A pacemaker, ICD (cardiac defibrillator) or other implant: Bring the ID card.  ? An implanted stimulator: Bring the remote control.  ? A legal guardian: Bring a copy of the certified (court-stamped) guardianship papers.  Please remove any jewelry, including body piercings. Leave jewelry and other valuables at home.  If you're going home the day of surgery    You must have a responsible adult drive you home. They should stay with you overnight as well.    If you don't have someone to stay with you, and you aren't safe to go home alone, we may keep you overnight. Insurance often won't pay for this.  After surgery  If it's hard to control your pain or you need more pain medicine, please call your surgeon's office.  Questions?   If you have any questions for your care team, list them here: _________________________________________________________________________________________________________________________________________________________________________ ____________________________________ ____________________________________ ____________________________________  For informational purposes only. Not to replace the advice of your health care provider. Copyright   2003, 2019 [employer] [facility]. All rights reserved. Clinically reviewed by Hue Younger MD. [employer] [id number] - REV 07/21.

## 2022-03-28 NOTE — PROGRESS NOTES
[address] [address]  Phone: [phone]  Primary Provider: Laine Canas  Pre-op Performing Provider: RORO FAUST      PREOPERATIVE EVALUATION:  Today's date: 3/28/2022    Anuradha Horn is a 51 year old female who presents for a preoperative evaluation.    Surgical Information:  Surgery/Procedure: Right thyroid lobectomy and isthmusecomty   Surgery Location: [facility]   Surgeon: Nirali Genao   Surgery Date: 4/12/22  Time of Surgery: 9:50 AM   Where patient plans to recover: At home with family  Fax number for surgical facility: Note does not need to be faxed, will be available electronically in Epic.    Type of Anesthesia Anticipated: General    Assessment & Plan     The proposed surgical procedure is considered INTERMEDIATE risk.    Preop general physical exam  She has no history of any diabetes  No history of any hypertension  No history of any CAD or CVA  Her exercise tolerance is very good  On the Duke activity index score her METS are at least 7 or more  She is medically optimized for surgery  No need for any further testing    Thyroid nodule  She is going to have right thyroid lobectomy/hysterectomy and possibly left thyroid lobectomy  She has nodules in both right and left lobe which are being monitored but she has decided to go with surgery now         Risks and Recommendations:  The patient has the following additional risks and recommendations for perioperative complications:   - No identified additional risk factors other than previously addressed    Medication Instructions:  Patient is to take all scheduled medications on the day of surgery  She was advised not to take any ibuprofen or aspirin or any other herbal supplements for 1 week before surgery  RECOMMENDATION:  APPROVAL GIVEN to proceed with proposed procedure, without further diagnostic  evaluation.                      Subjective     HPI related to upcoming procedure: History of nodules in both right and left thyroid lobes  Biopsies were done  She was being monitored for the same  Patient was taken to get the right and left thyroid lobes removed as the nodules are getting bigger    Preop Questions 3/26/2022   1. Have you ever had a heart attack or stroke? No   2. Have you ever had surgery on your heart or blood vessels, such as a stent placement, a coronary artery bypass, or surgery on an artery in your head, neck, heart, or legs? No   3. Do you have chest pain with activity? No   4. Do you have a history of  heart failure? No   5. Do you currently have a cold, bronchitis or symptoms of other infection? No   6. Do you have a cough, shortness of breath, or wheezing? No   7. Do you or anyone in your family have previous history of blood clots? No   8. Do you or does anyone in your family have a serious bleeding problem such as prolonged bleeding following surgeries or cuts? No   9. Have you ever had problems with anemia or been told to take iron pills? No   10. Have you had any abnormal blood loss such as black, tarry or bloody stools, or abnormal vaginal bleeding? No   11. Have you ever had a blood transfusion? No   12. Are you willing to have a blood transfusion if it is medically needed before, during, or after your surgery? Yes   13. Have you or any of your relatives ever had problems with anesthesia? No   14. Do you have sleep apnea, excessive snoring or daytime drowsiness? No   15. Do you have any artifical heart valves or other implanted medical devices like a pacemaker, defibrillator, or continuous glucose monitor? No   16. Do you have artificial joints? No   17. Are you allergic to latex? No   18. Is there any chance that you may be pregnant? No       Health Care Directive:  Patient does not have a Health Care Directive or Living Will: Discussed advance care planning with patient;  information given to patient to review.    Status of Chronic Conditions:  See problem list for active medical problems.  Problems all longstanding and stable, except as noted/documented.  See ROS for pertinent symptoms related to these conditions.      Review of Systems  CONSTITUTIONAL: NEGATIVE for fever, chills, change in weight  INTEGUMENTARY/SKIN: NEGATIVE for worrisome rashes, moles or lesions  EYES: NEGATIVE for vision changes or irritation  ENT/MOUTH: NEGATIVE for ear, mouth and throat problems  RESP: NEGATIVE for significant cough or SOB  CV: NEGATIVE for chest pain, palpitations or peripheral edema  GI: NEGATIVE for nausea, abdominal pain, heartburn, or change in bowel habits  : NEGATIVE for frequency, dysuria, or hematuria  MUSCULOSKELETAL: NEGATIVE for significant arthralgias or myalgia  NEURO: NEGATIVE for weakness, dizziness or paresthesias  ENDOCRINE: NEGATIVE for temperature intolerance, skin/hair changes  HEME: NEGATIVE for bleeding problems  PSYCHIATRIC: NEGATIVE for changes in mood or affect    Patient Active Problem List    Diagnosis Date Noted     Diminished ovarian reserve 01/29/2020     Priority: Medium     Menopause age 42       Thyroid nodule 11/08/2018     Priority: Medium     Atrophic vaginitis 11/06/2018     Priority: Medium     Calculus of gallbladder without cholecystitis without obstruction 11/05/2018     Priority: Medium     Migraine with aura and without status migrainosus, not intractable 07/24/2014     Priority: Medium     Treat with otc meds: exedrin/tyelenol  Rare aura (starts/light-headed)       CARDIOVASCULAR SCREENING; LDL GOAL LESS THAN 160 10/31/2010     Priority: Medium      Past Medical History:   Diagnosis Date     Infertility female      Normal delivery 05     Past Surgical History:   Procedure Laterality Date     COLONOSCOPY WITH CO2 INSUFFLATION N/A 9/24/2021    Procedure: COLONOSCOPY, WITH CO2 INSUFFLATION;  Surgeon: Pedro Luis Newby DO;  Location:  OR     D & C    "   following artifiscial insemination     SURGICAL HISTORY OF -   age 14    right hip slipped epiphysis     Current Outpatient Medications   Medication Sig Dispense Refill     alendronate (FOSAMAX) 70 MG tablet Take 1 tablet (70 mg) by mouth every 7 days Take 60 minutes before am meal with 8 oz. water. Remain upright for 30 minutes. 12 tablet 3     Ascorbic Acid (VITAMIN C PO)        calcium citrate-vitamin D (CITRACAL W/D) 250-100 MG-UNIT tablet Take 2 tablets by mouth 2 times daily 180 tablet 3     estradiol (ESTRACE VAGINAL) 0.1 MG/GM vaginal cream 1 gm PV Q HS x 2 weeks, then 2-3 x's weekly thereafter 42.5 g 3     Multiple Vitamins-Minerals (DAILY MULTI PO) Take 1 capsule by mouth daily         No Known Allergies     Social History     Tobacco Use     Smoking status: Never Smoker     Smokeless tobacco: Never Used   Substance Use Topics     Alcohol use: Yes     Comment: 2 drinks monthly      Family History   Problem Relation Age of Onset     Hypertension Father      Thyroid Disease Mother      Hypertension Mother      Breast Cancer Mother 75     Hypertension Paternal Grandmother      Asthma No family hx of      C.A.D. No family hx of      Diabetes No family hx of      Cerebrovascular Disease No family hx of      Cancer - colorectal No family hx of      Prostate Cancer No family hx of      History   Drug Use No         Objective     /70   Pulse 61   Temp 98.4  F (36.9  C) (Temporal)   Resp 16   Ht 1.753 m (5' 9\")   Wt 86.2 kg (190 lb)   LMP 04/15/2010   SpO2 97%   BMI 28.06 kg/m      Physical Exam    GENERAL APPEARANCE: healthy, alert and no distress     EYES: EOMI, PERRL     NECK: Nodules felt in both right and left thyroid lobes     RESP: lungs clear to auscultation - no rales, rhonchi or wheezes     CV: regular rates and rhythm, normal S1 S2, no S3 or S4 and no murmur, click or rub     MS: extremities normal- no gross deformities noted, no evidence of inflammation in joints, FROM in all " extremities.     SKIN: no suspicious lesions or rashes     NEURO: Normal strength and tone, sensory exam grossly normal, mentation intact and speech normal     PSYCH: mentation appears normal. and affect normal/bright     LYMPHATICS: No cervical adenopathy    Recent Labs   Lab Test 09/24/20  1329 07/30/20  0856   CR 0.69 0.70        Diagnostics:  No labs were ordered during this visit.   No EKG required, no history of coronary heart disease, significant arrhythmia, peripheral arterial disease or other structural heart disease.    Revised Cardiac Risk Index (RCRI):  The patient has the following serious cardiovascular risks for perioperative complications:   - No serious cardiac risks = 0 points     RCRI Interpretation: 0 points: Class I (very low risk - 0.4% complication rate)           Signed Electronically by: Theo Ramirez MD  Copy of this evaluation report is provided to requesting physician.

## 2022-04-08 ENCOUNTER — LAB (OUTPATIENT)
Dept: LAB | Facility: CLINIC | Age: 52
End: 2022-04-08
Payer: COMMERCIAL

## 2022-04-08 DIAGNOSIS — Z11.59 ENCOUNTER FOR SCREENING FOR OTHER VIRAL DISEASES: ICD-10-CM

## 2022-04-08 LAB — SARS-COV-2 RNA RESP QL NAA+PROBE: POSITIVE

## 2022-04-08 PROCEDURE — U0003 INFECTIOUS AGENT DETECTION BY NUCLEIC ACID (DNA OR RNA); SEVERE ACUTE RESPIRATORY SYNDROME CORONAVIRUS 2 (SARS-COV-2) (CORONAVIRUS DISEASE [COVID-19]), AMPLIFIED PROBE TECHNIQUE, MAKING USE OF HIGH THROUGHPUT TECHNOLOGIES AS DESCRIBED BY CMS-2020-01-R: HCPCS

## 2022-04-08 PROCEDURE — U0005 INFEC AGEN DETEC AMPLI PROBE: HCPCS

## 2022-04-11 ENCOUNTER — PREP FOR PROCEDURE (OUTPATIENT)
Dept: OTOLARYNGOLOGY | Facility: CLINIC | Age: 52
End: 2022-04-11
Payer: COMMERCIAL

## 2022-04-11 DIAGNOSIS — E04.1 THYROID NODULE: Primary | ICD-10-CM

## 2022-04-11 RX ORDER — FENTANYL CITRATE 50 UG/ML
25 INJECTION, SOLUTION INTRAMUSCULAR; INTRAVENOUS
Status: CANCELLED | OUTPATIENT
Start: 2022-04-11

## 2022-04-11 RX ORDER — FENTANYL CITRATE 50 UG/ML
25 INJECTION, SOLUTION INTRAMUSCULAR; INTRAVENOUS EVERY 5 MIN PRN
Status: CANCELLED | OUTPATIENT
Start: 2022-04-11

## 2022-04-11 RX ORDER — ONDANSETRON 2 MG/ML
4 INJECTION INTRAMUSCULAR; INTRAVENOUS EVERY 30 MIN PRN
Status: CANCELLED | OUTPATIENT
Start: 2022-04-11

## 2022-04-11 RX ORDER — LABETALOL HYDROCHLORIDE 5 MG/ML
10 INJECTION, SOLUTION INTRAVENOUS
Status: CANCELLED | OUTPATIENT
Start: 2022-04-11

## 2022-04-11 RX ORDER — ACETAMINOPHEN 325 MG/1
975 TABLET ORAL ONCE
Status: CANCELLED | OUTPATIENT
Start: 2022-04-11 | End: 2022-04-11

## 2022-04-11 RX ORDER — MEPERIDINE HYDROCHLORIDE 25 MG/ML
12.5 INJECTION INTRAMUSCULAR; INTRAVENOUS; SUBCUTANEOUS
Status: CANCELLED | OUTPATIENT
Start: 2022-04-11

## 2022-04-11 RX ORDER — LIDOCAINE 40 MG/G
CREAM TOPICAL
Status: CANCELLED | OUTPATIENT
Start: 2022-04-11

## 2022-04-11 RX ORDER — SODIUM CHLORIDE, SODIUM LACTATE, POTASSIUM CHLORIDE, CALCIUM CHLORIDE 600; 310; 30; 20 MG/100ML; MG/100ML; MG/100ML; MG/100ML
INJECTION, SOLUTION INTRAVENOUS CONTINUOUS
Status: CANCELLED | OUTPATIENT
Start: 2022-04-11

## 2022-04-11 RX ORDER — ONDANSETRON 4 MG/1
4 TABLET, ORALLY DISINTEGRATING ORAL EVERY 30 MIN PRN
Status: CANCELLED | OUTPATIENT
Start: 2022-04-11

## 2022-05-13 ENCOUNTER — TELEPHONE (OUTPATIENT)
Dept: SURGERY | Facility: CLINIC | Age: 52
End: 2022-05-13
Payer: COMMERCIAL

## 2022-05-13 NOTE — TELEPHONE ENCOUNTER
Called patient to reschedule her 2 week post-op appointment to the morning to open slot for surgery in the afternoon (2pm).    Left message for patient to return call to reschedule.

## 2022-05-17 ENCOUNTER — OFFICE VISIT (OUTPATIENT)
Dept: FAMILY MEDICINE | Facility: CLINIC | Age: 52
End: 2022-05-17
Payer: COMMERCIAL

## 2022-05-17 VITALS
WEIGHT: 187.8 LBS | OXYGEN SATURATION: 97 % | TEMPERATURE: 98.7 F | SYSTOLIC BLOOD PRESSURE: 126 MMHG | HEART RATE: 81 BPM | HEIGHT: 68 IN | BODY MASS INDEX: 28.46 KG/M2 | DIASTOLIC BLOOD PRESSURE: 79 MMHG

## 2022-05-17 DIAGNOSIS — Z23 HIGH PRIORITY FOR 2019-NCOV VACCINE: ICD-10-CM

## 2022-05-17 DIAGNOSIS — Z01.818 PREOP GENERAL PHYSICAL EXAM: Primary | ICD-10-CM

## 2022-05-17 DIAGNOSIS — E04.1 THYROID NODULE: ICD-10-CM

## 2022-05-17 PROCEDURE — 99214 OFFICE O/P EST MOD 30 MIN: CPT | Mod: 25 | Performed by: PHYSICIAN ASSISTANT

## 2022-05-17 PROCEDURE — 0054A COVID-19,PF,PFIZER (12+ YRS): CPT | Performed by: PHYSICIAN ASSISTANT

## 2022-05-17 PROCEDURE — 91305 COVID-19,PF,PFIZER (12+ YRS): CPT | Performed by: PHYSICIAN ASSISTANT

## 2022-05-17 ASSESSMENT — PAIN SCALES - GENERAL: PAINLEVEL: NO PAIN (0)

## 2022-05-17 NOTE — TELEPHONE ENCOUNTER
Patient returned clinics call. Patient works on 6/23/2022 till 2pm this is why she is needing an afternoon appointment.     Informed that I would send a message to Kaitlyn to look into this as she generally runs this schedule not me.     Natividad Botello LPN

## 2022-05-17 NOTE — TELEPHONE ENCOUNTER
Unable to keep patient's appointment in the afternoon as Dr. Genao will be in surgery.      Left message for patient to return a call to the clinic to discuss scheduling.

## 2022-05-17 NOTE — PROGRESS NOTES
[address] [address]  Phone: [phone]  Primary Provider: Laine Canas  Pre-op Performing Provider: JANET HILLMAN      PREOPERATIVE EVALUATION:  Today's date: 5/17/2022    Anuradha Horn is a 51 year old female who presents for a preoperative evaluation.    Surgical Information:  Surgery/Procedure: Right  And possibly total Thyroid Lobectomy   Surgery Location: [facility]   Surgeon: Dr Genao  Surgery Date: 6/7/2022  Time of Surgery: 9 am   Where patient plans to recover: At home with family  Fax number for surgical facility: Note does not need to be faxed, will be available electronically in Epic.    Type of Anesthesia Anticipated: General    Assessment & Plan     The proposed surgical procedure is considered LOW risk.    Problem List Items Addressed This Visit     Thyroid nodule      Other Visit Diagnoses     Preop general physical exam    -  Primary    High priority for 2019-nCoV vaccine                   Risks and Recommendations:  The patient has the following additional risks and recommendations for perioperative complications:   - No identified additional risk factors other than previously addressed    Medication Instructions:  Patient is to take all scheduled medications on the day of surgery    RECOMMENDATION:  APPROVAL GIVEN to proceed with proposed procedure, without further diagnostic evaluation.        9 minutes spent on the date of the encounter doing chart review, history and exam, documentation and further activities per the note        Subjective     HPI related to upcoming procedure:  Patient has been following with ENT for thyroid nodules, which are getting larger and patient would like removal.  Initial surgery was postponed due to + covid test.        Preop Questions 5/14/2022   1. Have you ever had a heart attack or stroke? No   2. Have you ever had surgery on your heart or blood  vessels, such as a stent placement, a coronary artery bypass, or surgery on an artery in your head, neck, heart, or legs? No   3. Do you have chest pain with activity? No   4. Do you have a history of  heart failure? No   5. Do you currently have a cold, bronchitis or symptoms of other infection? No   6. Do you have a cough, shortness of breath, or wheezing? No   7. Do you or anyone in your family have previous history of blood clots? No   8. Do you or does anyone in your family have a serious bleeding problem such as prolonged bleeding following surgeries or cuts? No   9. Have you ever had problems with anemia or been told to take iron pills? No   10. Have you had any abnormal blood loss such as black, tarry or bloody stools, or abnormal vaginal bleeding? No   11. Have you ever had a blood transfusion? No   12. Are you willing to have a blood transfusion if it is medically needed before, during, or after your surgery? Yes   13. Have you or any of your relatives ever had problems with anesthesia? No   14. Do you have sleep apnea, excessive snoring or daytime drowsiness? No   15. Do you have any artifical heart valves or other implanted medical devices like a pacemaker, defibrillator, or continuous glucose monitor? No   16. Do you have artificial joints? No   17. Are you allergic to latex? No   18. Is there any chance that you may be pregnant? No       Health Care Directive:  Patient does not have a Health Care Directive or Living Will:she has the one from her last preop at home    Preoperative Review of :   reviewed - no record of controlled substances prescribed.       Review of Systems  Constitutional, neuro, ENT, endocrine, pulmonary, cardiac, gastrointestinal, genitourinary, musculoskeletal, integument and psychiatric systems are negative, except as otherwise noted.    Patient Active Problem List    Diagnosis Date Noted     Diminished ovarian reserve 01/29/2020     Priority: Medium     Menopause age 42    "    Thyroid nodule 11/08/2018     Priority: Medium     Atrophic vaginitis 11/06/2018     Priority: Medium     Calculus of gallbladder without cholecystitis without obstruction 11/05/2018     Priority: Medium     Migraine with aura and without status migrainosus, not intractable 07/24/2014     Priority: Medium     Treat with otc meds: exedrin/tyelenol  Rare aura (starts/light-headed)       CARDIOVASCULAR SCREENING; LDL GOAL LESS THAN 160 10/31/2010     Priority: Medium      Past Medical History:   Diagnosis Date     Infertility female      Normal delivery 05     Past Surgical History:   Procedure Laterality Date     COLONOSCOPY WITH CO2 INSUFFLATION N/A 9/24/2021    Procedure: COLONOSCOPY, WITH CO2 INSUFFLATION;  Surgeon: Pedro Luis Newby DO;  Location: [facility] OR     D & C      following artifiscial insemination     SURGICAL HISTORY OF -   age 14    right hip slipped epiphysis     Current Outpatient Medications   Medication Sig Dispense Refill     alendronate (FOSAMAX) 70 MG tablet Take 1 tablet (70 mg) by mouth every 7 days Take 60 minutes before am meal with 8 oz. water. Remain upright for 30 minutes. 12 tablet 3     Ascorbic Acid (VITAMIN C PO)        calcium citrate-vitamin D (CITRACAL W/D) 250-100 MG-UNIT tablet Take 2 tablets by mouth 2 times daily 180 tablet 3     estradiol (ESTRACE VAGINAL) 0.1 MG/GM vaginal cream 1 gm PV Q HS x 2 weeks, then 2-3 x's weekly thereafter 42.5 g 3     Multiple Vitamins-Minerals (DAILY MULTI PO) Take 1 capsule by mouth daily         No Known Allergies     Social History     Tobacco Use     Smoking status: Never Smoker     Smokeless tobacco: Never Used   Substance Use Topics     Alcohol use: Yes     Comment: 2 drinks monthly        History   Drug Use No         Objective     /79 (BP Location: Left arm, Patient Position: Sitting, Cuff Size: Adult Large)   Pulse 81   Temp 98.7  F (37.1  C) (Tympanic)   Ht 1.72 m (5' 7.72\")   Wt 85.2 kg (187 lb 12.8 oz)   LMP 04/15/2010   " SpO2 97%   BMI 28.79 kg/m      Physical Exam    GENERAL APPEARANCE: healthy, alert and no distress     EYES: EOMI, PERRL     HENT: ear canals and TM's normal and nose and mouth without ulcers or lesions     NECK: no adenopathy, no asymmetry, masses, or scars and thyroid normal to palpation     RESP: lungs clear to auscultation - no rales, rhonchi or wheezes     CV: regular rates and rhythm, normal S1 S2, no S3 or S4 and no murmur, click or rub     ABDOMEN:  soft, nontender, no HSM or masses and bowel sounds normal     MS: extremities normal- no gross deformities noted, no evidence of inflammation in joints, FROM in all extremities.     SKIN: no suspicious lesions or rashes     NEURO: Normal strength and tone, sensory exam grossly normal, mentation intact and speech normal     PSYCH: mentation appears normal. and affect normal/bright     LYMPHATICS: No cervical adenopathy    Recent Labs   Lab Test 09/24/20  1329 07/30/20  0856   CR 0.69 0.70        Diagnostics:  No labs were ordered during this visit.   No EKG required, no history of coronary heart disease, significant arrhythmia, peripheral arterial disease or other structural heart disease.    Revised Cardiac Risk Index (RCRI):  The patient has the following serious cardiovascular risks for perioperative complications:   - No serious cardiac risks = 0 points     RCRI Interpretation: 0 points: Class I (very low risk - 0.4% complication rate)           Signed Electronically by: HOLLY Goncalves  Reviewed and agree with assessment and plan above. Chandler Vanegas MD    Copy of this evaluation report is provided to requesting physician.

## 2022-05-17 NOTE — PATIENT INSTRUCTIONS
Preparing for Your Surgery  Getting started  A nurse will call you to review your health history and instructions. They will give you an arrival time based on your scheduled surgery time. Please be ready to share:    Your doctor's clinic name and phone number    Your medical, surgical and anesthesia history    A list of allergies and sensitivities    A list of medicines, including herbal treatments and over-the-counter drugs    Whether the patient has a legal guardian (ask how to send us the papers in advance)  Please tell us if you're pregnant--or if there's any chance you might be pregnant. Some surgeries may injure a fetus (unborn baby), so they require a pregnancy test. Surgeries that are safe for a fetus don't always need a test, and you can choose whether to have one.   If you have a child who's having surgery, please ask for a copy of Preparing for Your Child's Surgery.    Preparing for surgery    Within 30 days of surgery: Have a pre-op exam (sometimes called an H&P, or History and Physical). This can be done at a clinic or pre-operative center.  ? If you're having a , you may not need this exam. Talk to your care team.    At your pre-op exam, talk to your care team about all medicines you take. If you need to stop any medicines before surgery, ask when to start taking them again.  ? We do this for your safety. Many medicines can make you bleed too much during surgery. Some change how well surgery (anesthesia) drugs work.    Call your insurance company to let them know you're having surgery. (If you don't have insurance, call [phone].)    Call your clinic if there's any change in your health. This includes signs of a cold or flu (sore throat, runny nose, cough, rash, fever). It also includes a scrape or scratch near the surgery site.    If you have questions on the day of surgery, call your hospital or surgery center.  COVID testing  You may need to be tested for COVID-19 before having  surgery. If so, we will give you instructions.  Eating and drinking guidelines  For your safety: Unless your surgeon tells you otherwise, follow the guidelines below.    Eat and drink as usual until 8 hours before surgery. After that, no food or milk.    Drink clear liquids until 2 hours before surgery. These are liquids you can see through, like water, Gatorade and Propel Water. You may also have black coffee and tea (no cream or milk).    Nothing by mouth within 2 hours of surgery. This includes gum, candy and breath mints.    If you drink alcohol: Stop drinking it the night before surgery.    If your care team tells you to take medicine on the morning of surgery, it's okay to take it with a sip of water.  Preventing infection    Shower or bathe the night before and morning of your surgery. Follow the instructions your clinic gave you. (If no instructions, use regular soap.)    Don't shave or clip hair near your surgery site. We'll remove the hair if needed.    Don't smoke or vape the morning of surgery. You may chew nicotine gum up to 2 hours before surgery. A nicotine patch is okay.  ? Note: Some surgeries require you to completely quit smoking and nicotine. Check with your surgeon.    Your care team will make every effort to keep you safe from infection. We will:  ? Clean our hands often with soap and water (or an alcohol-based hand rub).  ? Clean the skin at your surgery site with a special soap that kills germs.  ? Give you a special gown to keep you warm. (Cold raises the risk of infection.)  ? Wear special hair covers, masks, gowns and gloves during surgery.  ? Give antibiotic medicine, if prescribed. Not all surgeries need antibiotics.  What to bring on the day of surgery    Photo ID and insurance card    Copy of your health care directive, if you have one    Glasses and hearing aides (bring cases)  ? You can't wear contacts during surgery    Inhaler and eye drops, if you use them (tell us about these when  you arrive)    CPAP machine or breathing device, if you use them    A few personal items, if spending the night    If you have . . .  ? A pacemaker, ICD (cardiac defibrillator) or other implant: Bring the ID card.  ? An implanted stimulator: Bring the remote control.  ? A legal guardian: Bring a copy of the certified (court-stamped) guardianship papers.  Please remove any jewelry, including body piercings. Leave jewelry and other valuables at home.  If you're going home the day of surgery    You must have a responsible adult drive you home. They should stay with you overnight as well.    If you don't have someone to stay with you, and you aren't safe to go home alone, we may keep you overnight. Insurance often won't pay for this.  After surgery  If it's hard to control your pain or you need more pain medicine, please call your surgeon's office.  Questions?   If you have any questions for your care team, list them here: _________________________________________________________________________________________________________________________________________________________________________ ____________________________________ ____________________________________ ____________________________________  For informational purposes only. Not to replace the advice of your health care provider. Copyright   2003, 2019 [facility]. All rights reserved. Clinically reviewed by Hue Younger MD. [employer] [id number] - REV 07/21.

## 2022-05-18 ENCOUNTER — MYC MEDICAL ADVICE (OUTPATIENT)
Dept: SURGERY | Facility: CLINIC | Age: 52
End: 2022-05-18
Payer: COMMERCIAL

## 2022-05-18 NOTE — TELEPHONE ENCOUNTER
2nd attempt to reach patient. Will also send a [facility] message. Patient's post-op will need to be moved to the am.

## 2022-05-27 DIAGNOSIS — Z11.59 ENCOUNTER FOR SCREENING FOR OTHER VIRAL DISEASES: Primary | ICD-10-CM

## 2022-06-06 ENCOUNTER — ANESTHESIA EVENT (OUTPATIENT)
Dept: SURGERY | Facility: AMBULATORY SURGERY CENTER | Age: 52
End: 2022-06-06
Payer: COMMERCIAL

## 2022-06-06 RX ORDER — FENTANYL CITRATE 50 UG/ML
25 INJECTION, SOLUTION INTRAMUSCULAR; INTRAVENOUS
Status: CANCELLED | OUTPATIENT
Start: 2022-06-06

## 2022-06-07 ENCOUNTER — HOSPITAL ENCOUNTER (OUTPATIENT)
Facility: AMBULATORY SURGERY CENTER | Age: 52
Discharge: HOME | End: 2022-06-07
Attending: SURGERY
Payer: COMMERCIAL

## 2022-06-07 ENCOUNTER — ANESTHESIA (OUTPATIENT)
Dept: SURGERY | Facility: AMBULATORY SURGERY CENTER | Age: 52
End: 2022-06-07
Payer: COMMERCIAL

## 2022-06-07 VITALS
HEART RATE: 75 BPM | TEMPERATURE: 97.4 F | SYSTOLIC BLOOD PRESSURE: 135 MMHG | RESPIRATION RATE: 12 BRPM | HEIGHT: 68 IN | DIASTOLIC BLOOD PRESSURE: 75 MMHG | WEIGHT: 187.8 LBS | OXYGEN SATURATION: 95 % | BODY MASS INDEX: 28.46 KG/M2

## 2022-06-07 DIAGNOSIS — E04.1 THYROID NODULE: Primary | ICD-10-CM

## 2022-06-07 LAB — PTH-INTACT SERPL-MCNC: 62 PG/ML (ref 15–65)

## 2022-06-07 PROCEDURE — 83970 ASSAY OF PARATHORMONE: CPT | Performed by: PATHOLOGY

## 2022-06-07 PROCEDURE — 88307 TISSUE EXAM BY PATHOLOGIST: CPT | Mod: TC | Performed by: SURGERY

## 2022-06-07 PROCEDURE — 88307 TISSUE EXAM BY PATHOLOGIST: CPT | Mod: 26 | Performed by: PATHOLOGY

## 2022-06-07 PROCEDURE — 60240 REMOVAL OF THYROID: CPT | Mod: GC | Performed by: SURGERY

## 2022-06-07 PROCEDURE — 60240 REMOVAL OF THYROID: CPT

## 2022-06-07 RX ORDER — LIDOCAINE HYDROCHLORIDE 20 MG/ML
INJECTION, SOLUTION INFILTRATION; PERINEURAL PRN
Status: DISCONTINUED | OUTPATIENT
Start: 2022-06-07 | End: 2022-06-07

## 2022-06-07 RX ORDER — HYDROMORPHONE HYDROCHLORIDE 1 MG/ML
0.2 INJECTION, SOLUTION INTRAMUSCULAR; INTRAVENOUS; SUBCUTANEOUS EVERY 5 MIN PRN
Status: DISCONTINUED | OUTPATIENT
Start: 2022-06-07 | End: 2022-06-08 | Stop reason: HOSPADM

## 2022-06-07 RX ORDER — ONDANSETRON 2 MG/ML
INJECTION INTRAMUSCULAR; INTRAVENOUS PRN
Status: DISCONTINUED | OUTPATIENT
Start: 2022-06-07 | End: 2022-06-07

## 2022-06-07 RX ORDER — PROPOFOL 10 MG/ML
INJECTION, EMULSION INTRAVENOUS PRN
Status: DISCONTINUED | OUTPATIENT
Start: 2022-06-07 | End: 2022-06-07

## 2022-06-07 RX ORDER — LABETALOL HYDROCHLORIDE 5 MG/ML
10 INJECTION, SOLUTION INTRAVENOUS
Status: DISCONTINUED | OUTPATIENT
Start: 2022-06-07 | End: 2022-06-08 | Stop reason: HOSPADM

## 2022-06-07 RX ORDER — OXYCODONE HYDROCHLORIDE 5 MG/1
5 TABLET ORAL EVERY 4 HOURS PRN
Status: DISCONTINUED | OUTPATIENT
Start: 2022-06-07 | End: 2022-06-08 | Stop reason: HOSPADM

## 2022-06-07 RX ORDER — SODIUM CHLORIDE, SODIUM LACTATE, POTASSIUM CHLORIDE, CALCIUM CHLORIDE 600; 310; 30; 20 MG/100ML; MG/100ML; MG/100ML; MG/100ML
INJECTION, SOLUTION INTRAVENOUS CONTINUOUS
Status: DISCONTINUED | OUTPATIENT
Start: 2022-06-07 | End: 2022-06-07 | Stop reason: HOSPADM

## 2022-06-07 RX ORDER — MEPERIDINE HYDROCHLORIDE 25 MG/ML
12.5 INJECTION INTRAMUSCULAR; INTRAVENOUS; SUBCUTANEOUS
Status: DISCONTINUED | OUTPATIENT
Start: 2022-06-07 | End: 2022-06-08 | Stop reason: HOSPADM

## 2022-06-07 RX ORDER — CALCITRIOL 0.25 UG/1
0.25 CAPSULE, LIQUID FILLED ORAL DAILY
Qty: 14 CAPSULE | Refills: 0 | Status: SHIPPED | OUTPATIENT
Start: 2022-06-07 | End: 2022-10-27

## 2022-06-07 RX ORDER — SODIUM CHLORIDE, SODIUM LACTATE, POTASSIUM CHLORIDE, CALCIUM CHLORIDE 600; 310; 30; 20 MG/100ML; MG/100ML; MG/100ML; MG/100ML
INJECTION, SOLUTION INTRAVENOUS CONTINUOUS
Status: DISCONTINUED | OUTPATIENT
Start: 2022-06-07 | End: 2022-06-08 | Stop reason: HOSPADM

## 2022-06-07 RX ORDER — ONDANSETRON 2 MG/ML
4 INJECTION INTRAMUSCULAR; INTRAVENOUS EVERY 30 MIN PRN
Status: DISCONTINUED | OUTPATIENT
Start: 2022-06-07 | End: 2022-06-08 | Stop reason: HOSPADM

## 2022-06-07 RX ORDER — FENTANYL CITRATE 50 UG/ML
25 INJECTION, SOLUTION INTRAMUSCULAR; INTRAVENOUS EVERY 5 MIN PRN
Status: DISCONTINUED | OUTPATIENT
Start: 2022-06-07 | End: 2022-06-08 | Stop reason: HOSPADM

## 2022-06-07 RX ORDER — CALCIUM CARBONATE 500 MG/1
2 TABLET, CHEWABLE ORAL 3 TIMES DAILY
Qty: 84 TABLET | Refills: 0 | Status: SHIPPED | OUTPATIENT
Start: 2022-06-07 | End: 2022-06-21

## 2022-06-07 RX ORDER — ONDANSETRON 4 MG/1
4 TABLET, ORALLY DISINTEGRATING ORAL EVERY 30 MIN PRN
Status: DISCONTINUED | OUTPATIENT
Start: 2022-06-07 | End: 2022-06-08 | Stop reason: HOSPADM

## 2022-06-07 RX ORDER — ACETAMINOPHEN 325 MG/1
975 TABLET ORAL ONCE
Status: COMPLETED | OUTPATIENT
Start: 2022-06-07 | End: 2022-06-07

## 2022-06-07 RX ORDER — SCOLOPAMINE TRANSDERMAL SYSTEM 1 MG/1
1 PATCH, EXTENDED RELEASE TRANSDERMAL ONCE
Status: COMPLETED | OUTPATIENT
Start: 2022-06-07 | End: 2022-06-07

## 2022-06-07 RX ORDER — HYDROCODONE BITARTRATE AND ACETAMINOPHEN 5; 325 MG/1; MG/1
1-2 TABLET ORAL EVERY 4 HOURS PRN
Qty: 8 TABLET | Refills: 0 | Status: SHIPPED | OUTPATIENT
Start: 2022-06-07 | End: 2022-10-27

## 2022-06-07 RX ORDER — GLYCOPYRROLATE 0.2 MG/ML
INJECTION, SOLUTION INTRAMUSCULAR; INTRAVENOUS PRN
Status: DISCONTINUED | OUTPATIENT
Start: 2022-06-07 | End: 2022-06-07

## 2022-06-07 RX ORDER — EPHEDRINE SULFATE 50 MG/ML
INJECTION, SOLUTION INTRAMUSCULAR; INTRAVENOUS; SUBCUTANEOUS PRN
Status: DISCONTINUED | OUTPATIENT
Start: 2022-06-07 | End: 2022-06-07

## 2022-06-07 RX ORDER — LEVOTHYROXINE SODIUM 137 UG/1
137 TABLET ORAL DAILY
Qty: 90 TABLET | Refills: 1 | Status: SHIPPED | OUTPATIENT
Start: 2022-06-07 | End: 2022-08-08 | Stop reason: DRUGHIGH

## 2022-06-07 RX ORDER — PROPOFOL 10 MG/ML
INJECTION, EMULSION INTRAVENOUS CONTINUOUS PRN
Status: DISCONTINUED | OUTPATIENT
Start: 2022-06-07 | End: 2022-06-07

## 2022-06-07 RX ORDER — LIDOCAINE 40 MG/G
CREAM TOPICAL
Status: DISCONTINUED | OUTPATIENT
Start: 2022-06-07 | End: 2022-06-07 | Stop reason: HOSPADM

## 2022-06-07 RX ORDER — FENTANYL CITRATE 50 UG/ML
INJECTION, SOLUTION INTRAMUSCULAR; INTRAVENOUS PRN
Status: DISCONTINUED | OUTPATIENT
Start: 2022-06-07 | End: 2022-06-07

## 2022-06-07 RX ADMIN — SODIUM CHLORIDE, SODIUM LACTATE, POTASSIUM CHLORIDE, CALCIUM CHLORIDE: 600; 310; 30; 20 INJECTION, SOLUTION INTRAVENOUS at 08:04

## 2022-06-07 RX ADMIN — PROPOFOL 150 MG: 10 INJECTION, EMULSION INTRAVENOUS at 09:35

## 2022-06-07 RX ADMIN — FENTANYL CITRATE 50 MCG: 50 INJECTION, SOLUTION INTRAMUSCULAR; INTRAVENOUS at 09:31

## 2022-06-07 RX ADMIN — PROPOFOL 100 MCG/KG/MIN: 10 INJECTION, EMULSION INTRAVENOUS at 09:35

## 2022-06-07 RX ADMIN — EPHEDRINE SULFATE 5 MG: 50 INJECTION, SOLUTION INTRAMUSCULAR; INTRAVENOUS; SUBCUTANEOUS at 09:59

## 2022-06-07 RX ADMIN — ACETAMINOPHEN 975 MG: 325 TABLET ORAL at 08:04

## 2022-06-07 RX ADMIN — PROPOFOL 50 MG: 10 INJECTION, EMULSION INTRAVENOUS at 09:36

## 2022-06-07 RX ADMIN — EPHEDRINE SULFATE 5 MG: 50 INJECTION, SOLUTION INTRAMUSCULAR; INTRAVENOUS; SUBCUTANEOUS at 09:48

## 2022-06-07 RX ADMIN — GLYCOPYRROLATE 0.2 MG: 0.2 INJECTION, SOLUTION INTRAMUSCULAR; INTRAVENOUS at 09:32

## 2022-06-07 RX ADMIN — LIDOCAINE HYDROCHLORIDE 80 MG: 20 INJECTION, SOLUTION INFILTRATION; PERINEURAL at 09:37

## 2022-06-07 RX ADMIN — ONDANSETRON 4 MG: 2 INJECTION INTRAMUSCULAR; INTRAVENOUS at 09:32

## 2022-06-07 RX ADMIN — EPHEDRINE SULFATE 5 MG: 50 INJECTION, SOLUTION INTRAMUSCULAR; INTRAVENOUS; SUBCUTANEOUS at 09:43

## 2022-06-07 RX ADMIN — Medication 100 MCG: at 10:00

## 2022-06-07 RX ADMIN — FENTANYL CITRATE 50 MCG: 50 INJECTION, SOLUTION INTRAMUSCULAR; INTRAVENOUS at 09:34

## 2022-06-07 RX ADMIN — SCOLOPAMINE TRANSDERMAL SYSTEM 1 PATCH: 1 PATCH, EXTENDED RELEASE TRANSDERMAL at 09:32

## 2022-06-07 RX ADMIN — Medication 100 MCG: at 09:48

## 2022-06-07 RX ADMIN — ONDANSETRON 4 MG: 2 INJECTION INTRAMUSCULAR; INTRAVENOUS at 11:21

## 2022-06-07 ASSESSMENT — COPD QUESTIONNAIRES: COPD: 0

## 2022-06-07 ASSESSMENT — ENCOUNTER SYMPTOMS: ORTHOPNEA: 0

## 2022-06-07 ASSESSMENT — LIFESTYLE VARIABLES: TOBACCO_USE: 0

## 2022-06-07 NOTE — OP NOTE
NAME: Anuradha Horn    MEDICAL RECORD NUMBER: [id number]    YOB: 1970    DATE OF SURGERY: June 7, 2022    SURGEON: Nirali Genao MD    ASSISTANT SURGEON: Ernst Thibodeaux MD    PREOPERATIVE DIAGNOSIS: bilateral thyroid nodules    POSTOPERATIVE DIAGNOSIS: same    PROCEDURE: total thyroidectomy  Bilateral recurrent laryngeal nerve monitoring    INDICATIONS: Ms. Anuradha Horn is a 51F with bilateral thyroid nodules. 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 Affirma testing was consistent with benign pathology. Patient is experiencing compressive symptoms. After detailed discussion of the risks, benefits, and alternatives to surgery, patient elected to proceed with the aforementioned procedures.    ANESTHESIA: General; endotracheal intubation with NIM tube    FINDINGS:     EBL: < 10cc    SPECIMENS: total thyroidectomy    COMPLICATIONS: None    DESCRIPTION OF PROCEDURE: Informed consent was obtained and the patient was brought to the operating room. The patient was laid supine on the operating table and anesthesia was induced via mask ventilation. Patient was then endotracheally intubated by the Anesthesia team without difficulty. The patient was then prepped and draped in the usual fashion. The NIM electrodes were connected and nerve monitoring was used for the remainder of the case.    A timeout was performed. A 4cm incision was designed in a horizontal midline crease in the anterior neck. This was carried through subcutaneous tissue with a bovie and through platysma. Subplatysmal flaps were elevated superiorly and inferiorly. The strap muscles were elevated off the face of the thyroid and lateralized. The superior pole vessels were dissected free and ligated with a ligasure and clips. The superior parathyroid gland was identified and preserved. The middle thyroid vein and inferior pole vessels were identified and ligated and clipped.  The gland was retracted medially and the recurrent laryngeal nerve was identified and preserved. The gland was dissected off the anterior face of the trachea. The same procedure was then carried out on the left. The thyroid was removed and was passed off as a permanent surgical specimen. The RLN stimulated at 0.5 mAmp at the end of the case bilaterally. Hemostasis was ensured with a Valsalva maneuver. Surgicel was placed in the cavity. The strap muscles were reapproximated at midline with a 3-0 chromic suture. The platysma and deep dermis were reapproximated with buried 3-0 chromic. The skin was closed with a running subcuticular monocryl suture and closed with dermabond. The betadine was wiped away and the drapes were taken down.    This completed the procedure. There were no complications and all counts were correct. Patient was returned to the care of the Anesthesia team for extubation. Dr. Genao was present for all critical portions of the case.    DISPOSITION: Home    Ersnt Thibodeaux MD    I was present for the surgery, performed the critical components of the surgery and agree with the above note.    Nirali Genao MD

## 2022-06-07 NOTE — DISCHARGE INSTRUCTIONS
[facility]  Home Care Following Anesthesia  For 24 hours after surgery:  Get plenty of rest.  A responsible adult must stay with you for at least 24 hours after you leave the surgery center.  Do not drive or use heavy equipment.  If you have weakness or tingling, don't drive or use heavy equipment until this feeling goes away.   Do not drink alcohol.   Avoid strenuous or risky activities.  Ask for help when climbing stairs.  You may feel lightheaded.  IF so, sit for a few minutes before standing.  Have someone help you get up.   If you have nausea (feel sick to your stomach): Drink only clear liquids such as apple juice, ginger ale, broth or 7-Up.  Rest may also help.  Be sure to drink enough fluids.  Move to a regular diet as you feel able.   You may have a slight fever.  Call the doctor if your fever is over 100 F (37.7 C) (taken under the tongue) or lasts longer than 24 hours.  You may have a dry mouth, a sore throat, muscle aches or trouble sleeping. These should go away after 24 hours.  Do not make important or legal decisions.   It is recommended to avoid smoking.          Tips for taking pain medications  To get the best pain relief possible, remember these points:  Take pain medications as directed, before pain becomes severe.  Pain medication can upset your stomach: taking it with food may help.  Constipation is a common side effect of pain medication. Drink plenty of  fluids.  Eat foods high in fiber. Take a stool softener if recommended by your doctor or pharmacist.  Do not drink alcohol, drive or operate machinery while taking pain medications.  Ask about other ways to control pain, such as with heat, ice or relaxation.    Tylenol/Acetaminophen Consumption  To help encourage the safe use of acetaminophen, the makers of TYLENOL  have lowered the maximum daily dose for single-ingredient Extra Strength TYLENOL  (acetaminophen) products sold in the [place] from 8 pills per  day (4,000 mg) to 6 pills per day (3,000 mg). The dosing interval has also changed from 2 pills every 4-6 hours to 2 pills every 6 hours.  If you feel your pain relief is insufficient, you may take Tylenol/Acetaminophen in addition to your narcotic pain medication.   Be careful not to exceed 3,000 mg of Tylenol/Acetaminophen in a 24 hour period from all sources.  If you are taking extra strength Tylenol/acetaminophen (500 mg), the maximum dose is 6 tablets in 24 hours.  If you are taking regular strength acetaminophen (325 mg), the maximum dose is 9 tablets in 24 hours.    Call a doctor for any of the following:  Signs of infection (fever, growing tenderness at the surgery site, a large amount of drainage or bleeding, severe pain, foul-smelling drainage, redness, swelling).  It has been over 8 to 10 hours since surgery and you are still not able to urinate (pass water).  Headache for over 24 hours.  Numbness, tingling or weakness the day after surgery (if you had spinal anesthesia).  Signs of Covid-19 infection (temperature over 100 degrees, shortness of breath, cough, loss of taste/smell, generalized body aches, persistent headache, chills, sore throat, nausea/vomiting/diarrhea)  Your doctor is:  Dr. Nirali Genao, ENT Otolaryngology: [phone]                  Or dial [phone] and ask for the resident on call for:  ENT Otolaryngology  For emergency care, call the:  [facility]:  [phone] (TTY for hearing impaired: [phone])

## 2022-06-07 NOTE — ANESTHESIA PREPROCEDURE EVALUATION
Anesthesia Pre-Procedure Evaluation    Patient: Anuradha Horn   MRN: [id number] : 1970        Procedure : Procedure(s):  right thyroid lobectomy and isthmusectomy, possible total          Past Medical History:   Diagnosis Date     Infertility female      Normal delivery 05      Past Surgical History:   Procedure Laterality Date     COLONOSCOPY WITH CO2 INSUFFLATION N/A 2021    Procedure: COLONOSCOPY, WITH CO2 INSUFFLATION;  Surgeon: Pedro Luis Newby DO;  Location: [facility] OR     D & C      following artifiscial insemination     SURGICAL HISTORY OF -   age 14    right hip slipped epiphysis      No Known Allergies   Social History     Tobacco Use     Smoking status: Never Smoker     Smokeless tobacco: Never Used   Substance Use Topics     Alcohol use: Yes     Comment: 2 drinks monthly       Wt Readings from Last 1 Encounters:   22 85.2 kg (187 lb 12.8 oz)        Anesthesia Evaluation   Pt has had prior anesthetic. Type: MAC.    No history of anesthetic complications       ROS/MED HX  ENT/Pulmonary:    (-) tobacco use, asthma, COPD and recent URI   Neurologic:       Cardiovascular:    (-) KELLY, orthopnea/PND and syncope   METS/Exercise Tolerance: >4 METS    Hematologic:       Musculoskeletal:       GI/Hepatic:    (-) GERD   Renal/Genitourinary:       Endo: Comment: Thyroid nodule: denies any problems with voice quality, breathing, or swallowing. No hypo/hyperthyroid symptoms.      Psychiatric/Substance Use:       Infectious Disease:       Malignancy:       Other:            Physical Exam    Airway        Mallampati: II   TM distance: > 3 FB   Neck ROM: full   Mouth opening: > 3 cm    Respiratory Devices and Support         Dental  no notable dental history         Cardiovascular          Rhythm and rate: regular and normal     Pulmonary           breath sounds clear to auscultation           OUTSIDE LABS:  CBC:   Lab Results   Component Value Date    WBC 7.7 2020    WBC 5.0 2010     HGB 13.9 01/29/2020    HGB 14.7 05/06/2010    HCT 43.4 01/29/2020    HCT 43.5 05/06/2010     01/29/2020     05/06/2010     BMP:   Lab Results   Component Value Date     05/06/2010    POTASSIUM 4.3 05/06/2010    CHLORIDE 102 05/06/2010    CO2 28 05/06/2010    BUN 17 09/24/2020    BUN 13 07/30/2020    CR 0.69 09/24/2020    CR 0.70 07/30/2020    GLC 86 11/05/2018    GLC 80 03/30/2017     COAGS: No results found for: PTT, INR, FIBR  POC: No results found for: BGM, HCG, HCGS  HEPATIC:   Lab Results   Component Value Date    ALBUMIN 4.0 09/24/2020    ALT 9 05/06/2010    ALKPHOS 78 09/24/2020     OTHER:   Lab Results   Component Value Date    YUMI 9.3 09/24/2020    PHOS 3.4 09/24/2020    TSH 1.56 06/21/2021       Anesthesia Plan    ASA Status:  2   NPO Status:  NPO Appropriate    Anesthesia Type: General.     - Airway: ETT   Induction: Intravenous.   Maintenance: Balanced.        Consents    Anesthesia Plan(s) and associated risks, benefits, and realistic alternatives discussed. Questions answered and patient/representative(s) expressed understanding.    - Discussed:     - Discussed with:  Patient         Postoperative Care    Pain management: IV analgesics, Oral pain medications.   PONV prophylaxis: Ondansetron (or other 5HT-3), Dexamethasone or Solumedrol, Background Propofol Infusion     Comments:    Other Comments: Discussed risks of general anesthesia, including aspiration pneumonia, sore throat/hoarse voice, abrasions/damage to lips/tongue/teeth, nausea, rare complications (including medication reactions, cardiac, pulmonary, hypoxia/low oxygen, recall). Ensured understanding, invited questions and all questions were answered. Patient wishes to proceed.       H&P reviewed: Unable to attach H&P to encounter due to EHR limitations. H&P Update: appropriate H&P reviewed, patient examined. No interval changes since H&P (within 30 days).         Rosanna Preciado MD

## 2022-06-07 NOTE — ANESTHESIA CARE TRANSFER NOTE
Patient: Anuradha Horn    Procedure: Procedure(s):  Thyroidectomy, total       Diagnosis: Thyroid nodule [E04.1]  Diagnosis Additional Information: No value filed.    Anesthesia Type:   General     Note:    Oropharynx: oropharynx clear of all foreign objects and spontaneously breathing  Level of Consciousness: drowsy  Oxygen Supplementation: room air    Independent Airway: airway patency satisfactory and stable  Dentition: dentition unchanged  Vital Signs Stable: post-procedure vital signs reviewed and stable  Report to RN Given: handoff report given  Patient transferred to: PACU    Handoff Report: Identifed the Patient, Identified the Reponsible Provider, Reviewed the pertinent medical history, Discussed the surgical course, Reviewed Intra-OP anesthesia mangement and issues during anesthesia, Set expectations for post-procedure period and Allowed opportunity for questions and acknowledgement of understanding      Vitals:  Vitals Value Taken Time   BP     Temp     Pulse     Resp     SpO2         Electronically Signed By: ELMER Ritchie CRNA  June 7, 2022  11:04 AM

## 2022-06-07 NOTE — ANESTHESIA POSTPROCEDURE EVALUATION
Patient: Anuradha Horn    Procedure: Procedure(s):  Thyroidectomy, total       Anesthesia Type:  General    Note:  Disposition: Outpatient   Postop Pain Control: Uneventful            Sign Out: Well controlled pain   PONV: No   Neuro/Psych: Uneventful            Sign Out: Acceptable/Baseline neuro status   Airway/Respiratory: Uneventful            Sign Out: Acceptable/Baseline resp. status   CV/Hemodynamics: Uneventful            Sign Out: Acceptable CV status; No obvious hypovolemia; No obvious fluid overload   Other NRE: NONE   DID A NON-ROUTINE EVENT OCCUR? No           Last vitals:  Vitals Value Taken Time   /81 06/07/22 1145   Temp 36.3  C (97.3  F) 06/07/22 1145   Pulse 71 06/07/22 1145   Resp 16 06/07/22 1145   SpO2 96 % 06/07/22 1145       Electronically Signed By: Rosanna Preciado MD  June 7, 2022  3:58 PM   No

## 2022-06-25 ENCOUNTER — HEALTH MAINTENANCE LETTER (OUTPATIENT)
Age: 52
End: 2022-06-25

## 2022-06-30 ENCOUNTER — OFFICE VISIT (OUTPATIENT)
Dept: SURGERY | Facility: CLINIC | Age: 52
End: 2022-06-30
Payer: COMMERCIAL

## 2022-06-30 DIAGNOSIS — E89.0 POST-SURGICAL HYPOTHYROIDISM: Primary | ICD-10-CM

## 2022-06-30 PROCEDURE — 99024 POSTOP FOLLOW-UP VISIT: CPT | Performed by: SURGERY

## 2022-06-30 NOTE — NURSING NOTE
Anuradha Horn's goals for this visit include:   Chief Complaint   Patient presents with     Surgical Followup     2 week post op       She requests these members of her care team be copied on today's visit information: no    PCP: Laine Canas    Referring Provider:  No referring provider defined for this encounter.    LMP 04/15/2010     Do you need any medication refills at today's visit? No    Taryn Johnson LPN

## 2022-06-30 NOTE — LETTER
6/30/2022         RE: Anuradha Horn  [address]  [facility] [address]        Dear Colleague,    Thank you for referring your patient, Anuradha Horn, to the [facility]. Please see a copy of my visit note below.    2 week post op visit s/p total thyroidectomy for thyroid nodules    Since the surgery, the patient denies any problems with voice quality, inspiration or swallowing. No sx of hypo or hyperthyroidism. No sx of hypocalcemia    PE:  Wound is healing well. No evidence of hematoma, seroma or infection. Sutures and dermabond removed    Pathology reviewed with the patient    Plan:  Reviewed wound management and massage  She may discontinue her Ca and Vit D  Check TFT's at 6 weeks post op  Follow up as needed.    Nirali Genao MD        Again, thank you for allowing me to participate in the care of your patient.        Sincerely,        Nirali Genao MD

## 2022-07-29 ENCOUNTER — MYC MEDICAL ADVICE (OUTPATIENT)
Dept: ENDOCRINOLOGY | Facility: CLINIC | Age: 52
End: 2022-07-29

## 2022-07-29 DIAGNOSIS — E04.1 THYROID NODULE: ICD-10-CM

## 2022-08-02 NOTE — TELEPHONE ENCOUNTER
[employer] message sent to patient.  There should be another refill available for patient. That would get her through to her next visit.     Agustina Alexander on 8/2/2022 at 10:21 AM

## 2022-08-03 ENCOUNTER — TELEPHONE (OUTPATIENT)
Dept: ENDOCRINOLOGY | Facility: CLINIC | Age: 52
End: 2022-08-03

## 2022-08-03 NOTE — TELEPHONE ENCOUNTER
LESLI 08/03/2022 for pt to c/b to schedule appointment per Dr. Barnard. KAILASH approved for 09/28/2022 at 3:30. Please see below:      David Barnard MD  [id number] Yesterday (12:09 PM)     SHERIN    Please schedule 9/28 3:30 at KAIL[facility] spot.   David Barnard MD

## 2022-08-04 ENCOUNTER — LAB (OUTPATIENT)
Dept: LAB | Facility: CLINIC | Age: 52
End: 2022-08-04
Payer: COMMERCIAL

## 2022-08-04 DIAGNOSIS — E89.0 POST-SURGICAL HYPOTHYROIDISM: ICD-10-CM

## 2022-08-04 LAB
T4 FREE SERPL-MCNC: 1.62 NG/DL (ref 0.76–1.46)
TSH SERPL DL<=0.005 MIU/L-ACNC: 0.05 MU/L (ref 0.4–4)

## 2022-08-04 PROCEDURE — 84439 ASSAY OF FREE THYROXINE: CPT

## 2022-08-04 PROCEDURE — 84443 ASSAY THYROID STIM HORMONE: CPT

## 2022-08-04 PROCEDURE — 36415 COLL VENOUS BLD VENIPUNCTURE: CPT

## 2022-08-04 NOTE — TELEPHONE ENCOUNTER
Awaiting patient's response for the offered appt date/time.      Agustina Alexander on 8/4/2022 at 11:43 AM

## 2022-08-04 NOTE — TELEPHONE ENCOUNTER
LESLI 8/4/2022 for pt to c/b to schedule appointment per Dr. Barnard. KAILASH approved for 9/28/22 at 3:30 virtual visit w/ Dr. Barnard. This will populate w/ auto search and will need to be manually scheduled.

## 2022-08-04 NOTE — TELEPHONE ENCOUNTER
Patient placed on schedule 10/5 at 3:30pm. Unable to come in to clinic but can do virtual. Patient was unable to make 9/28 work. She will still need a 2 month refill. To get her to her appointment.  Medication pended.  Routing to provider.     Agustina Alexander on 8/4/2022 at 2:35 PM

## 2022-08-08 DIAGNOSIS — E89.0 POSTOPERATIVE HYPOTHYROIDISM: Primary | ICD-10-CM

## 2022-08-08 RX ORDER — LEVOTHYROXINE SODIUM 125 UG/1
125 TABLET ORAL DAILY
Qty: 90 TABLET | Refills: 3 | Status: SHIPPED | OUTPATIENT
Start: 2022-08-08 | End: 2023-09-06

## 2022-08-08 RX ORDER — LEVOTHYROXINE SODIUM 137 UG/1
137 TABLET ORAL DAILY
Qty: 90 TABLET | Refills: 1 | Status: CANCELLED | OUTPATIENT
Start: 2022-08-08

## 2022-08-15 DIAGNOSIS — M81.0 OSTEOPOROSIS WITHOUT CURRENT PATHOLOGICAL FRACTURE, UNSPECIFIED OSTEOPOROSIS TYPE: ICD-10-CM

## 2022-08-18 RX ORDER — ALENDRONATE SODIUM 70 MG/1
TABLET ORAL
Qty: 12 TABLET | Refills: 0 | Status: SHIPPED | OUTPATIENT
Start: 2022-08-18 | End: 2022-10-05

## 2022-08-18 NOTE — TELEPHONE ENCOUNTER
alendronate (FOSAMAX) 70 MG     Last Written Prescription Date:  7/27/21  Last Fill Quantity: 12,   # refills: 3  Last Office Visit : 9/28/21  Future Office visit:  10/5/21  Routing refill request to provider for review/approval because: RF PENDING  OVER DUE CR & DEXA

## 2022-08-19 NOTE — TELEPHONE ENCOUNTER
GFR Estimate   Date Value Ref Range Status   09/24/2020 >90 >60 mL/min/[1.73_m2] Final     Comment:     Non  GFR Calc  Starting 12/18/2018, serum creatinine based estimated GFR (eGFR) will be   calculated using the Chronic Kidney Disease Epidemiology Collaboration   (CKD-EPI) equation.       GFR Estimate If Black   Date Value Ref Range Status   09/24/2020 >90 >60 mL/min/[1.73_m2] Final     Comment:      GFR Calc  Starting 12/18/2018, serum creatinine based estimated GFR (eGFR) will be   calculated using the Chronic Kidney Disease Epidemiology Collaboration   (CKD-EPI) equation.

## 2022-08-20 ENCOUNTER — HEALTH MAINTENANCE LETTER (OUTPATIENT)
Age: 52
End: 2022-08-20

## 2022-08-23 DIAGNOSIS — E89.0 POSTOPERATIVE HYPOTHYROIDISM: Primary | ICD-10-CM

## 2022-10-05 ENCOUNTER — VIRTUAL VISIT (OUTPATIENT)
Dept: ENDOCRINOLOGY | Facility: CLINIC | Age: 52
End: 2022-10-05
Payer: COMMERCIAL

## 2022-10-05 DIAGNOSIS — E89.0 POSTOPERATIVE HYPOTHYROIDISM: ICD-10-CM

## 2022-10-05 DIAGNOSIS — M81.0 OSTEOPOROSIS WITHOUT CURRENT PATHOLOGICAL FRACTURE, UNSPECIFIED OSTEOPOROSIS TYPE: Primary | ICD-10-CM

## 2022-10-05 PROCEDURE — 99214 OFFICE O/P EST MOD 30 MIN: CPT | Mod: 95 | Performed by: INTERNAL MEDICINE

## 2022-10-05 RX ORDER — ALENDRONATE SODIUM 70 MG/1
70 TABLET ORAL
Qty: 12 TABLET | Refills: 3 | Status: SHIPPED | OUTPATIENT
Start: 2022-10-05 | End: 2023-12-04

## 2022-10-05 NOTE — PATIENT INSTRUCTIONS
Osteoporosis   Weight bearing Exercises  Fall prevention  Adequate Calcium and vitamin D intake: for maintenance calcium 1200 mg daily from all sources and vitamin D 1000 IU daily.    Avoidance of excessive alcohol intake.   alendronate (FOSAMAX) 70 MG tablet 12 tablet 3 10/5/2022  --   Sig - Route: Take 1 tablet (70 mg) by mouth every 7 days Take 60 minutes before am meal with 8 oz. water. Remain upright for 30 minutes. - Oral     Thyroid  Levothyroxine should be taken on empty stomach and wait at least 30 minutes before eating. Don't take supplements or multivitamins containing iron and calcium within 4 hours of taking levothyroxine tablet.     Schedule blood work and DEXA scan.   Orders Placed This Encounter   Procedures    Dexa hip/pelvis/spine    TSH with free T4 reflex    C-Telopeptide, Beta-Cross-Linked    Vitamin D Deficiency (D3 Only)    Albumin level    Calcium    Urea nitrogen    Creatinine

## 2022-10-05 NOTE — NURSING NOTE
Patient denies any changes since echeck-in regarding medication and allergies and states all information entered during echeck-in remains accurate.  Taylor Myhre,LOWELLF

## 2022-10-05 NOTE — PROGRESS NOTES
Endocrinology Clinic Visit    Chief Complaint: No chief complaint on file.     Information obtained from:Patient    Subjective:         HPI: Anuradha Horn is a 52 year old female here to discuss osteoporosis management.    Hx of thyroid nodule; now s/p thyroidectomy. Pathology below.      A.  THYROID GLAND, TOTAL THYROIDECTOMY:  -Follicular adenoma, located within right lobe, 3.2 cm in greatest dimension.  -Two benign colloid nodules in the left lobe, 0.7 cm and 0.5 cm, respectively.  -Background thyroid gland tissue with no other histopathologic abnormality.  -No evidence of malignancy identified.    Currently on levothyroxine 125 mcg daily reduced from 137 daily after suppressed TSH.     Osteoporosis currently on FOSAMAX. No issued from medications.     Answers for HPI/ROS submitted by the patient on 10/5/2022  General Symptoms: No  Skin Symptoms: No  HENT Symptoms: No  EYE SYMPTOMS: No  HEART SYMPTOMS: No  LUNG SYMPTOMS: No  INTESTINAL SYMPTOMS: No  URINARY SYMPTOMS: No  GYNECOLOGIC SYMPTOMS: No  BREAST SYMPTOMS: No  SKELETAL SYMPTOMS: No  BLOOD SYMPTOMS: No  NERVOUS SYSTEM SYMPTOMS: No  MENTAL HEALTH SYMPTOMS: No     A.  THYROID GLAND, TOTAL THYROIDECTOMY:  -Follicular adenoma, located within right lobe, 3.2 cm in greatest dimension.  -Two benign colloid nodules in the left lobe, 0.7 cm and 0.5 cm, respectively.  -Background thyroid gland tissue with no other histopathologic abnormality.  -No evidence of malignancy identified.     No Known Allergies    Current Outpatient Medications   Medication Sig Dispense Refill     alendronate (FOSAMAX) 70 MG tablet TAKE 1 TABLET BY MOUTH EVERY SEVEN DAYS. TAKE 60 MINUTES BEFORE AM MEAL WITH 8 OZ WATER. 12 tablet 0     Ascorbic Acid (VITAMIN C PO)        calcium citrate-vitamin D (CITRACAL W/D) 250-100 MG-UNIT tablet Take 2 tablets by mouth 2 times daily 180 tablet 3     estradiol (ESTRACE VAGINAL) 0.1 MG/GM vaginal cream 1 gm PV Q HS x 2 weeks, then 2-3 x's  weekly thereafter 42.5 g 3     levothyroxine (SYNTHROID/LEVOTHROID) 125 MCG tablet Take 1 tablet (125 mcg) by mouth daily 90 tablet 3     Multiple Vitamins-Minerals (DAILY MULTI PO) Take 1 capsule by mouth daily       calcitRIOL (ROCALTROL) 0.25 MCG capsule Take 1 capsule (0.25 mcg) by mouth daily for 14 days 14 capsule 0     HYDROcodone-acetaminophen (NORCO) 5-325 MG tablet Take 1-2 tablets by mouth every 4 hours as needed for moderate to severe pain 8 tablet 0       Review of Systems     as per HPI above    Objective:   LMP 04/15/2010   Constitutional: Pleasant no acute cardiopulmonary distress.   Psychological: appropriate mood and affect   In House Labs:       TSH   Date Value Ref Range Status   08/04/2022 0.05 (L) 0.40 - 4.00 mU/L Final   06/21/2021 1.56 0.40 - 4.00 mU/L Final   07/30/2020 1.20 0.40 - 4.00 mU/L Final   11/05/2018 1.39 0.40 - 4.00 mU/L Final   07/24/2014 1.03 0.4 - 5.0 mU/L Final     Free T4   Date Value Ref Range Status   08/04/2022 1.62 (H) 0.76 - 1.46 ng/dL Final       Creatinine   Date Value Ref Range Status   09/24/2020 0.69 0.52 - 1.04 mg/dL Final   ]    Assessment/Treatment Plan:      Multiple thyroid nodules/hoarseness of voice/visible neck enlargement:  S/p thyroidectomy.   A.  THYROID GLAND, TOTAL THYROIDECTOMY:  -Follicular adenoma, located within right lobe, 3.2 cm in greatest dimension.  -Two benign colloid nodules in the left lobe, 0.7 cm and 0.5 cm, respectively.  -Background thyroid gland tissue with no other histopathologic abnormality.  -No evidence of malignancy identified.    Postoperative hypothyroidism: levothyroxine 125 mcg daily. Check a follow up TSH and will further adjust based on results as needed. Levothyroxine should be taken on empty stomach and wait at least 30 minutes before eating. Don't take supplements or multivitamins containing iron and calcium within 4 hours of taking levothyroxine tablet.       Osteoporosis without current fracture -Z score and T-scores  for lumbar spines involving L3-L4 -3.6 and -3.2 respectively.  Compared to previous study there is -15.6 deterioration in the spine bone density and similarly at the hip -9.1.  risk factor is early or premature menopause 10 years ago.  Plan    To identify additional risk factors checked parathyroid hormone, screening for celiac disease and other routine labs which were within the normal limits.  Vitamin D has been checked previously and within acceptable range.        Adequate calcium and vitamin D discussed about calcium 1200 mg daily from all sources and vitamin D 1000 IUs daily.        Strengthening exercises like walking and other strengthening exercises.      Continue alendronate at the same dose.      Check follow up dexa scan and c-telopeptide.     I will contact the patient with the test results.  Return to clinic in 12 months.      David Barnard MD  Staff Endocrinologist    Division of Endocrinology and Diabetes      Video-Visit Details    Type of service:  Video Visit    Video Start Time: 3:30 PM    Video End Time:3:43 PM   :15

## 2022-10-05 NOTE — LETTER
10/5/2022       RE: Anuradha Horn  [address]  [facility] [address]     Dear Colleague,    Thank you for referring your patient, Anuradha Horn, to the [facility] ENDOCRINOLOGY [facility] [place] at [facility]. Please see a copy of my visit note below.    Anuradha Horn is being evaluated via a billable video visit.        How would you like to obtain your AVS? [employer]  For the video visit, send the invitation by: Text to cell phone: [phone]  Will anyone else be joining your video visit? No      Endocrinology Clinic Visit    Chief Complaint: No chief complaint on file.     Information obtained from:Patient    Subjective:         HPI: Anuradha Horn is a 52 year old female here to discuss osteoporosis management.    Hx of thyroid nodule; now s/p thyroidectomy. Pathology below.      A.  THYROID GLAND, TOTAL THYROIDECTOMY:  -Follicular adenoma, located within right lobe, 3.2 cm in greatest dimension.  -Two benign colloid nodules in the left lobe, 0.7 cm and 0.5 cm, respectively.  -Background thyroid gland tissue with no other histopathologic abnormality.  -No evidence of malignancy identified.    Currently on levothyroxine 125 mcg daily reduced from 137 daily after suppressed TSH.     Osteoporosis currently on FOSAMAX. No issued from medications.     Answers for HPI/ROS submitted by the patient on 10/5/2022  General Symptoms: No  Skin Symptoms: No  HENT Symptoms: No  EYE SYMPTOMS: No  HEART SYMPTOMS: No  LUNG SYMPTOMS: No  INTESTINAL SYMPTOMS: No  URINARY SYMPTOMS: No  GYNECOLOGIC SYMPTOMS: No  BREAST SYMPTOMS: No  SKELETAL SYMPTOMS: No  BLOOD SYMPTOMS: No  NERVOUS SYSTEM SYMPTOMS: No  MENTAL HEALTH SYMPTOMS: No     A.  THYROID GLAND, TOTAL THYROIDECTOMY:  -Follicular adenoma, located within right lobe, 3.2 cm in greatest dimension.  -Two benign colloid nodules in the left lobe, 0.7 cm and 0.5 cm,  respectively.  -Background thyroid gland tissue with no other histopathologic abnormality.  -No evidence of malignancy identified.     No Known Allergies    Current Outpatient Medications   Medication Sig Dispense Refill     alendronate (FOSAMAX) 70 MG tablet TAKE 1 TABLET BY MOUTH EVERY SEVEN DAYS. TAKE 60 MINUTES BEFORE AM MEAL WITH 8 OZ WATER. 12 tablet 0     Ascorbic Acid (VITAMIN C PO)        calcium citrate-vitamin D (CITRACAL W/D) 250-100 MG-UNIT tablet Take 2 tablets by mouth 2 times daily 180 tablet 3     estradiol (ESTRACE VAGINAL) 0.1 MG/GM vaginal cream 1 gm PV Q HS x 2 weeks, then 2-3 x's weekly thereafter 42.5 g 3     levothyroxine (SYNTHROID/LEVOTHROID) 125 MCG tablet Take 1 tablet (125 mcg) by mouth daily 90 tablet 3     Multiple Vitamins-Minerals (DAILY MULTI PO) Take 1 capsule by mouth daily       calcitRIOL (ROCALTROL) 0.25 MCG capsule Take 1 capsule (0.25 mcg) by mouth daily for 14 days 14 capsule 0     HYDROcodone-acetaminophen (NORCO) 5-325 MG tablet Take 1-2 tablets by mouth every 4 hours as needed for moderate to severe pain 8 tablet 0       Review of Systems     as per HPI above    Objective:   LMP 04/15/2010   Constitutional: Pleasant no acute cardiopulmonary distress.   Psychological: appropriate mood and affect   In House Labs:       TSH   Date Value Ref Range Status   08/04/2022 0.05 (L) 0.40 - 4.00 mU/L Final   06/21/2021 1.56 0.40 - 4.00 mU/L Final   07/30/2020 1.20 0.40 - 4.00 mU/L Final   11/05/2018 1.39 0.40 - 4.00 mU/L Final   07/24/2014 1.03 0.4 - 5.0 mU/L Final     Free T4   Date Value Ref Range Status   08/04/2022 1.62 (H) 0.76 - 1.46 ng/dL Final       Creatinine   Date Value Ref Range Status   09/24/2020 0.69 0.52 - 1.04 mg/dL Final   ]    Assessment/Treatment Plan:      Multiple thyroid nodules/hoarseness of voice/visible neck enlargement:  S/p thyroidectomy.   A.  THYROID GLAND, TOTAL THYROIDECTOMY:  -Follicular adenoma, located within right lobe, 3.2 cm in greatest  dimension.  -Two benign colloid nodules in the left lobe, 0.7 cm and 0.5 cm, respectively.  -Background thyroid gland tissue with no other histopathologic abnormality.  -No evidence of malignancy identified.    Postoperative hypothyroidism: levothyroxine 125 mcg daily. Check a follow up TSH and will further adjust based on results as needed. Levothyroxine should be taken on empty stomach and wait at least 30 minutes before eating. Don't take supplements or multivitamins containing iron and calcium within 4 hours of taking levothyroxine tablet.       Osteoporosis without current fracture -Z score and T-scores for lumbar spines involving L3-L4 -3.6 and -3.2 respectively.  Compared to previous study there is -15.6 deterioration in the spine bone density and similarly at the hip -9.1.  risk factor is early or premature menopause 10 years ago.  Plan    To identify additional risk factors checked parathyroid hormone, screening for celiac disease and other routine labs which were within the normal limits.  Vitamin D has been checked previously and within acceptable range.        Adequate calcium and vitamin D discussed about calcium 1200 mg daily from all sources and vitamin D 1000 IUs daily.        Strengthening exercises like walking and other strengthening exercises.      Continue alendronate at the same dose.      Check follow up dexa scan and c-telopeptide.     I will contact the patient with the test results.  Return to clinic in 12 months.      David Barnard MD  Staff Endocrinologist    Division of Endocrinology and Diabetes      Video-Visit Details    Type of service:  Video Visit    Video Start Time: 3:30 PM    Video End Time:3:43 PM   :15

## 2022-10-05 NOTE — PROGRESS NOTES
Anuradha Horn is being evaluated via a billable video visit.        How would you like to obtain your AVS? [employer]  For the video visit, send the invitation by: Text to cell phone: [phone]  Will anyone else be joining your video visit? No

## 2022-10-06 NOTE — PROGRESS NOTES
2 week post op visit s/p total thyroidectomy for thyroid nodules    Since the surgery, the patient denies any problems with voice quality, inspiration or swallowing. No sx of hypo or hyperthyroidism. No sx of hypocalcemia    PE:  Wound is healing well. No evidence of hematoma, seroma or infection. Sutures and dermabond removed    Pathology reviewed with the patient    Plan:  Reviewed wound management and massage  She may discontinue her Ca and Vit D  Check TFT's at 6 weeks post op  Follow up as needed.    Nirali Genao MD

## 2022-10-27 ENCOUNTER — OFFICE VISIT (OUTPATIENT)
Dept: FAMILY MEDICINE | Facility: CLINIC | Age: 52
End: 2022-10-27
Payer: COMMERCIAL

## 2022-10-27 VITALS
TEMPERATURE: 98.1 F | HEART RATE: 61 BPM | HEIGHT: 68 IN | OXYGEN SATURATION: 97 % | WEIGHT: 190.6 LBS | SYSTOLIC BLOOD PRESSURE: 107 MMHG | BODY MASS INDEX: 28.89 KG/M2 | RESPIRATION RATE: 20 BRPM | DIASTOLIC BLOOD PRESSURE: 72 MMHG

## 2022-10-27 DIAGNOSIS — Z00.00 ROUTINE GENERAL MEDICAL EXAMINATION AT A HEALTH CARE FACILITY: Primary | ICD-10-CM

## 2022-10-27 DIAGNOSIS — E04.1 THYROID NODULE: ICD-10-CM

## 2022-10-27 DIAGNOSIS — M81.0 OSTEOPOROSIS WITHOUT CURRENT PATHOLOGICAL FRACTURE, UNSPECIFIED OSTEOPOROSIS TYPE: ICD-10-CM

## 2022-10-27 DIAGNOSIS — N95.2 ATROPHIC VAGINITIS: ICD-10-CM

## 2022-10-27 DIAGNOSIS — E89.0 POSTOPERATIVE HYPOTHYROIDISM: ICD-10-CM

## 2022-10-27 DIAGNOSIS — Z12.31 VISIT FOR SCREENING MAMMOGRAM: ICD-10-CM

## 2022-10-27 LAB
ALBUMIN SERPL-MCNC: 4 G/DL (ref 3.4–5)
BUN SERPL-MCNC: 19 MG/DL (ref 7–30)
CALCIUM SERPL-MCNC: 9.2 MG/DL (ref 8.5–10.1)
CREAT SERPL-MCNC: 0.68 MG/DL (ref 0.52–1.04)
GFR SERPL CREATININE-BSD FRML MDRD: >90 ML/MIN/1.73M2
T4 FREE SERPL-MCNC: 1.45 NG/DL (ref 0.76–1.46)
TSH SERPL DL<=0.005 MIU/L-ACNC: 0.05 MU/L (ref 0.4–4)

## 2022-10-27 PROCEDURE — 84443 ASSAY THYROID STIM HORMONE: CPT | Performed by: FAMILY MEDICINE

## 2022-10-27 PROCEDURE — 99000 SPECIMEN HANDLING OFFICE-LAB: CPT | Performed by: FAMILY MEDICINE

## 2022-10-27 PROCEDURE — 84520 ASSAY OF UREA NITROGEN: CPT | Performed by: FAMILY MEDICINE

## 2022-10-27 PROCEDURE — 99396 PREV VISIT EST AGE 40-64: CPT | Performed by: FAMILY MEDICINE

## 2022-10-27 PROCEDURE — 82523 COLLAGEN CROSSLINKS: CPT | Mod: 90 | Performed by: FAMILY MEDICINE

## 2022-10-27 PROCEDURE — 82040 ASSAY OF SERUM ALBUMIN: CPT | Performed by: FAMILY MEDICINE

## 2022-10-27 PROCEDURE — 82310 ASSAY OF CALCIUM: CPT | Performed by: FAMILY MEDICINE

## 2022-10-27 PROCEDURE — 82565 ASSAY OF CREATININE: CPT | Performed by: FAMILY MEDICINE

## 2022-10-27 PROCEDURE — 36415 COLL VENOUS BLD VENIPUNCTURE: CPT | Performed by: FAMILY MEDICINE

## 2022-10-27 PROCEDURE — 82306 VITAMIN D 25 HYDROXY: CPT | Performed by: FAMILY MEDICINE

## 2022-10-27 PROCEDURE — 84439 ASSAY OF FREE THYROXINE: CPT | Performed by: FAMILY MEDICINE

## 2022-10-27 RX ORDER — ESTRADIOL 0.1 MG/G
CREAM VAGINAL
Qty: 42.5 G | Refills: 3 | Status: SHIPPED | OUTPATIENT
Start: 2022-10-27

## 2022-10-27 ASSESSMENT — ENCOUNTER SYMPTOMS
FREQUENCY: 0
ARTHRALGIAS: 0
HEARTBURN: 0
PARESTHESIAS: 0
NAUSEA: 0
DIARRHEA: 0
CONSTIPATION: 0
WEAKNESS: 0
FEVER: 0
DYSURIA: 0
PALPITATIONS: 0
DIZZINESS: 0
HEMATOCHEZIA: 0
HEMATURIA: 0
ABDOMINAL PAIN: 0
BREAST MASS: 0
SHORTNESS OF BREATH: 0
JOINT SWELLING: 0
MYALGIAS: 0
SORE THROAT: 0
EYE PAIN: 0
CHILLS: 0
NERVOUS/ANXIOUS: 0
COUGH: 0
HEADACHES: 0

## 2022-10-27 ASSESSMENT — PAIN SCALES - GENERAL: PAINLEVEL: NO PAIN (0)

## 2022-10-27 NOTE — PROGRESS NOTES
SUBJECTIVE:   CC: Anuradha is an 52 year old who presents for preventive health visit.       Patient has been advised of split billing requirements and indicates understanding: Yes  Healthy Habits:     Getting at least 3 servings of Calcium per day:  Yes    Bi-annual eye exam:  Yes    Dental care twice a year:  Yes    Sleep apnea or symptoms of sleep apnea:  None    Diet:  Regular (no restrictions)    Frequency of exercise:  2-3 days/week    Duration of exercise:  30-45 minutes    Taking medications regularly:  Yes    Medication side effects:  None    PHQ-2 Total Score: 0    Additional concerns today:  No    Walking for exercise.     Alendronate started few years ago.  Has been following with endocrinology and had a recent visit.  Thyroid was removed this last year due to enlarging nodule.      Today's PHQ-2 Score:   PHQ-2 ( 1999 [employer]) 10/27/2022   Q1: Little interest or pleasure in doing things 0   Q2: Feeling down, depressed or hopeless 0   PHQ-2 Score 0   PHQ-2 Total Score (12-17 Years)- Positive if 3 or more points; Administer PHQ-A if positive -   Q1: Little interest or pleasure in doing things Not at all   Q2: Feeling down, depressed or hopeless Not at all   PHQ-2 Score 0       Abuse: Current or Past (Physical, Sexual or Emotional) - No  Do you feel safe in your environment? YES        Social History     Tobacco Use     Smoking status: Never     Smokeless tobacco: Never   Substance Use Topics     Alcohol use: Yes     Comment: 2 drinks monthly      If you drink alcohol do you typically have >3 drinks per day or >7 drinks per week? No    Alcohol Use 10/27/2022   Prescreen: >3 drinks/day or >7 drinks/week? No   Prescreen: >3 drinks/day or >7 drinks/week? -   No flowsheet data found.    Reviewed orders with patient.  Reviewed health maintenance and updated orders accordingly - Yes      Breast Cancer Screening:    FHS-7:   Breast CA Risk Assessment (FHS-7) 6/21/2021 3/26/2022 5/14/2022 10/27/2022   Did any of  your first-degree relatives have breast or ovarian cancer? Yes Yes Yes Yes   Did any of your relatives have bilateral breast cancer? No No No No   Did any man in your family have breast cancer? No No No No   Did any woman in your family have breast and ovarian cancer? Yes Yes Yes Yes   Did any woman in your family have breast cancer before age 50 y? No No No No   Do you have 2 or more relatives with breast and/or ovarian cancer? No No No No   Do you have 2 or more relatives with breast and/or bowel cancer? No No No No       Mammogram Screening: Recommended annual mammography  Pertinent mammograms are reviewed under the imaging tab.    History of abnormal Pap smear: NO - age 30-65 PAP every 5 years with negative HPV co-testing recommended  PAP / HPV Latest Ref Rng & Units 6/21/2021 3/30/2017   PAP (Historical) - NIL NIL   HPV16 NEG:Negative Negative Negative   HPV18 NEG:Negative Negative Negative   HRHPV NEG:Negative Negative Negative     Reviewed and updated as needed this visit by clinical staff                  Reviewed and updated as needed this visit by Provider                   Review of Systems   Constitutional: Negative for chills and fever.   HENT: Negative for congestion, ear pain, hearing loss and sore throat.    Eyes: Negative for pain and visual disturbance.   Respiratory: Negative for cough and shortness of breath.    Cardiovascular: Negative for chest pain, palpitations and peripheral edema.   Gastrointestinal: Negative for abdominal pain, constipation, diarrhea, heartburn, hematochezia and nausea.   Breasts:  Negative for tenderness, breast mass and discharge.   Genitourinary: Negative for dysuria, frequency, genital sores, hematuria, pelvic pain, urgency, vaginal bleeding and vaginal discharge.   Musculoskeletal: Negative for arthralgias, joint swelling and myalgias.   Skin: Negative for rash.   Neurological: Negative for dizziness, weakness, headaches and paresthesias.   Psychiatric/Behavioral:  "Negative for mood changes. The patient is not nervous/anxious.      Migraines: very infrequent. 3 per 4 mo. exedrine works well. Takes early with aura (dark on sides of vision) or pain in back of head/nausea)     OBJECTIVE:   /72   Pulse 61   Temp 98.1  F (36.7  C) (Oral)   Resp 20   Ht 1.72 m (5' 7.72\")   Wt 86.5 kg (190 lb 9.6 oz)   LMP 04/15/2010   SpO2 97%   BMI 29.22 kg/m    Physical Exam  GENERAL: healthy, alert and no distress  EYES: Eyes grossly normal to inspection, PERRL and conjunctivae and sclerae normal  HENT: ear canals and TM's normal, nose and mouth without ulcers or lesions  NECK: no adenopathy, no asymmetry, masses, or scars and thyroid normal to palpation  RESP: lungs clear to auscultation - no rales, rhonchi or wheezes  BREAST: normal without masses, tenderness or nipple discharge and no palpable axillary masses or adenopathy  CV: regular rate and rhythm, normal S1 S2, no S3 or S4, no murmur, click or rub, no peripheral edema and peripheral pulses strong  ABDOMEN: soft, nontender, no hepatosplenomegaly, no masses and bowel sounds normal   (female): normal female external genitalia, normal urethral meatus, vaginal mucosa pink, moist, well rugated, and normal cervix/adnexa/uterus without masses or discharge  MS: no gross musculoskeletal defects noted, no edema  SKIN: no suspicious lesions or rashes  NEURO: Normal strength and tone, mentation intact and speech normal  PSYCH: mentation appears normal, affect normal/bright    Diagnostic Test Results:  Labs reviewed in Epic    ASSESSMENT/PLAN:   (Z00.00) Routine general medical examination at a health care facility  (primary encounter diagnosis)    (Z12.31) Visit for screening mammogram  Comment:   Plan: MA SCREENING DIGITAL BILAT - Future  (s+30)            (N95.2) Atrophic vaginitis  Comment: Vaginal estrogen working well  Plan:        estradiol (ESTRACE VAGINAL) 0.1 MG/GM vaginal         cream           (E04.1) Thyroid " "nodule  (E89.0) Postoperative hypothyroidism  Comment: Status post thyroidectomy, currently on levothyroxine  Plan: Continue plan per endocrinology    (M81.0) Osteoporosis without current pathological fracture, unspecified osteoporosis type  Comment: Tolerating bisphosphonate  Plan: Per endocrinology.  Bone density scan planned for this year.  She is interested in an in person endocrine visit and requests referral to new provider for this as her current provider only does virtual visits.    Adult Endocrinology  Referral,     Migraines  Note: Currently managed, stable  Plan: Continue with Excedrin as needed and follow-up as needed if new or worsening symptoms      COUNSELING:  Reviewed preventive health counseling, as reflected in patient instructions       Regular exercise       Healthy diet/nutrition       Vision screening       Osteoporosis prevention/bone health    Estimated body mass index is 28.79 kg/m  as calculated from the following:    Height as of 6/7/22: 1.72 m (5' 7.72\").    Weight as of 6/7/22: 85.2 kg (187 lb 12.8 oz).    Weight management plan: Discussed healthy diet and exercise guidelines    She reports that she has never smoked. She has never used smokeless tobacco.      Counseling Resources:  ATP IV Guidelines  Pooled Cohorts Equation Calculator  Breast Cancer Risk Calculator  BRCA-Related Cancer Risk Assessment: FHS-7 Tool  FRAX Risk Assessment  ICSI Preventive Guidelines  Dietary Guidelines for Americans, 2010  USDA's MyPlate  ASA Prophylaxis  Lung CA Screening    Laine Canas MD  [facility]  "

## 2022-10-28 LAB — DEPRECATED CALCIDIOL+CALCIFEROL SERPL-MC: 34 UG/L (ref 20–75)

## 2022-10-29 LAB — COLLAGEN CTX SERPL-MCNC: 92 PG/ML

## 2022-11-03 NOTE — RESULT ENCOUNTER NOTE
Lennox Ling,     Here are your recent results.   The C-telopeptide level is within the desired range.   The TSH level indicates that we need to reduce the dose of Levothyroxine. Please reduce levothyroxine to 125 mcg daily 6 days a week and half a tablet one day a week. Levothyroxine should be taken on empty stomach and wait at least 30 minutes before eating. Don't take supplements or multivitamins containing iron and calcium within 4 hours of taking levothyroxine tablet.   Vitamin d and calcium labs are within the expected range.         Please let us know if you have any questions or concerns.    Regards,  David Barnard MD

## 2022-12-21 ENCOUNTER — ANCILLARY PROCEDURE (OUTPATIENT)
Dept: BONE DENSITY | Facility: CLINIC | Age: 52
End: 2022-12-21
Attending: INTERNAL MEDICINE
Payer: COMMERCIAL

## 2022-12-21 ENCOUNTER — ANCILLARY PROCEDURE (OUTPATIENT)
Dept: MAMMOGRAPHY | Facility: CLINIC | Age: 52
End: 2022-12-21
Attending: FAMILY MEDICINE
Payer: COMMERCIAL

## 2022-12-21 DIAGNOSIS — Z12.31 VISIT FOR SCREENING MAMMOGRAM: ICD-10-CM

## 2022-12-21 DIAGNOSIS — M81.0 OSTEOPOROSIS WITHOUT CURRENT PATHOLOGICAL FRACTURE, UNSPECIFIED OSTEOPOROSIS TYPE: ICD-10-CM

## 2022-12-21 PROCEDURE — 77063 BREAST TOMOSYNTHESIS BI: CPT | Performed by: RADIOLOGY

## 2022-12-21 PROCEDURE — 77080 DXA BONE DENSITY AXIAL: CPT | Performed by: RADIOLOGY

## 2022-12-21 PROCEDURE — 77067 SCR MAMMO BI INCL CAD: CPT | Performed by: RADIOLOGY

## 2022-12-21 PROCEDURE — 77081 DXA BONE DENSITY APPENDICULR: CPT | Mod: XU | Performed by: RADIOLOGY

## 2022-12-22 NOTE — RESULT ENCOUNTER NOTE
Dear Anuradha Canas is out of the office and I am reviewing your results.   Your mammogram was normal.  I do recommend annual mammography  Please call or MyChart my office with any questions or concerns.   Yolanda Smith, PAC

## 2022-12-22 NOTE — RESULT ENCOUNTER NOTE
Dear Anuradha,     Here are your recent results.   There is no significant change in bone density involving the lumbar spine.  There is 10.5% improvement at the left femoral neck.  The bone density at the wrist-radius 33% is within the normal limits however compared to previous results there is 7% bone loss.  We will discuss next steps at the time of your follow up visit.   Please let us know if you have any questions or concerns.    Regards,  David Barnard MD

## 2023-09-06 DIAGNOSIS — E89.0 POSTOPERATIVE HYPOTHYROIDISM: ICD-10-CM

## 2023-09-06 RX ORDER — LEVOTHYROXINE SODIUM 125 UG/1
125 TABLET ORAL DAILY
Qty: 90 TABLET | Refills: 0 | Status: SHIPPED | OUTPATIENT
Start: 2023-09-06 | End: 2023-10-09

## 2023-09-15 ENCOUNTER — LAB (OUTPATIENT)
Dept: LAB | Facility: CLINIC | Age: 53
End: 2023-09-15
Payer: COMMERCIAL

## 2023-09-15 DIAGNOSIS — E89.0 POSTOPERATIVE HYPOTHYROIDISM: ICD-10-CM

## 2023-09-15 LAB — TSH SERPL DL<=0.005 MIU/L-ACNC: 0.46 UIU/ML (ref 0.3–4.2)

## 2023-09-15 PROCEDURE — 84443 ASSAY THYROID STIM HORMONE: CPT

## 2023-09-15 PROCEDURE — 36415 COLL VENOUS BLD VENIPUNCTURE: CPT

## 2023-10-09 ENCOUNTER — VIRTUAL VISIT (OUTPATIENT)
Dept: ENDOCRINOLOGY | Facility: CLINIC | Age: 53
End: 2023-10-09
Payer: COMMERCIAL

## 2023-10-09 DIAGNOSIS — M81.0 OSTEOPOROSIS WITHOUT CURRENT PATHOLOGICAL FRACTURE, UNSPECIFIED OSTEOPOROSIS TYPE: Primary | ICD-10-CM

## 2023-10-09 DIAGNOSIS — E89.0 POSTOPERATIVE HYPOTHYROIDISM: ICD-10-CM

## 2023-10-09 PROCEDURE — 99214 OFFICE O/P EST MOD 30 MIN: CPT | Mod: VID | Performed by: INTERNAL MEDICINE

## 2023-10-09 RX ORDER — LEVOTHYROXINE SODIUM 125 UG/1
125 TABLET ORAL DAILY
Qty: 90 TABLET | Refills: 4 | Status: SHIPPED | OUTPATIENT
Start: 2023-10-09

## 2023-10-09 NOTE — PROGRESS NOTES
Endocrinology Clinic Visit    Chief Complaint: RECHECK (Follow up since surgery last year. )     Information obtained from:Patient      Assessment/Treatment Plan:      Osteoporosis without current fracture T-scores for lumbar spines involving L3-L4 -3.  Compared to previous study there is no significant change.   Risk factor is early or premature menopause 10 years ago & no HRT.  Right now already in the menopausal age and there is no HRT indication based on symptoms reviewed at this time.    Plan  To identify additional risk factors checked parathyroid hormone, screening for celiac disease and other routine labs which were within the normal limits.  Vitamin D has been checked previously and within acceptable range.      Adequate calcium and vitamin D discussed about calcium 1200 mg daily from all sources and vitamin D 1000 IUs daily.      Strengthening exercises like walking and other strengthening exercises.    Continue alendronate at the same dose.    Check follow up dexa scan and c-telopeptide.     Multiple thyroid nodules/hoarseness of voice/visible neck enlargement:  S/p thyroidectomy.   A.  THYROID GLAND, TOTAL THYROIDECTOMY:  -Follicular adenoma, located within right lobe, 3.2 cm in greatest dimension.  -Two benign colloid nodules in the left lobe, 0.7 cm and 0.5 cm, respectively.  -Background thyroid gland tissue with no other histopathologic abnormality.  -No evidence of malignancy identified.    Postoperative hypothyroidism: levothyroxine 125 mcg daily. TSH normal.  Levothyroxine should be taken on empty stomach and wait at least 30 minutes before eating. Don't take supplements or multivitamins containing iron and calcium within 4 hours of taking levothyroxine tablet.     Question regarding hormone replacement therapy    Regarding your question of hormonal replacement therapy.  Hormonal replacement therapy is indicated irrespective of symptoms when people undergo early menopause as in your case.  However,  once you are in that menopausal age group(right now]; hormone replacement therapy is recommended based on menopausal symptoms particularly the presence of hot flash or not.  For additional recommendations, please check with your gynecologist.  Please let me know if any questions.      David Barnard MD  Staff Endocrinologist    Division of Endocrinology and Diabetes  Subjective:         HPI: Anuradha Horn is a 53 year old female here to discuss osteoporosis management.    Hx of thyroid nodule; now s/p thyroidectomy. Pathology below.      A.  THYROID GLAND, TOTAL THYROIDECTOMY:  -Follicular adenoma, located within right lobe, 3.2 cm in greatest dimension.  -Two benign colloid nodules in the left lobe, 0.7 cm and 0.5 cm, respectively.  -Background thyroid gland tissue with no other histopathologic abnormality.  -No evidence of malignancy identified.    Currently on levothyroxine 125 mcg daily.     Osteoporosis currently on FOSAMAX. No issued from medications. 9/2020     :15Answers submitted by the patient for this visit:  Symptoms you have experienced in the last 30 days (Submitted on 10/8/2023)  General Symptoms: No  Skin Symptoms: No  HENT Symptoms: No  EYE SYMPTOMS: No  HEART SYMPTOMS: No  LUNG SYMPTOMS: No  INTESTINAL SYMPTOMS: No  URINARY SYMPTOMS: No  GYNECOLOGIC SYMPTOMS: No  BREAST SYMPTOMS: No  SKELETAL SYMPTOMS: No  BLOOD SYMPTOMS: No  NERVOUS SYSTEM SYMPTOMS: No  MENTAL HEALTH SYMPTOMS: No     A.  THYROID GLAND, TOTAL THYROIDECTOMY:  -Follicular adenoma, located within right lobe, 3.2 cm in greatest dimension.  -Two benign colloid nodules in the left lobe, 0.7 cm and 0.5 cm, respectively.  -Background thyroid gland tissue with no other histopathologic abnormality.  -No evidence of malignancy identified.     No Known Allergies    Current Outpatient Medications   Medication Sig Dispense Refill    alendronate (FOSAMAX) 70 MG tablet Take 1 tablet (70 mg) by mouth every 7 days Take 60 minutes  before am meal with 8 oz. water. Remain upright for 30 minutes. 12 tablet 3    Ascorbic Acid (VITAMIN C PO)       calcium citrate-vitamin D (CITRACAL W/D) 250-100 MG-UNIT tablet Take 2 tablets by mouth 2 times daily 180 tablet 3    estradiol (ESTRACE VAGINAL) 0.1 MG/GM vaginal cream 1 gm PV Q HS x 2 weeks, then 2-3 x's weekly thereafter 42.5 g 3    levothyroxine (SYNTHROID/LEVOTHROID) 125 MCG tablet Take 1 tablet (125 mcg) by mouth daily 90 tablet 0    Multiple Vitamins-Minerals (DAILY MULTI PO) Take 1 capsule by mouth daily         Review of Systems     as per HPI above    Objective:   LMP 04/15/2010   Constitutional: Pleasant no acute cardiopulmonary distress.   Psychological: appropriate mood and affect   In House Labs:       TSH   Date Value Ref Range Status   09/15/2023 0.46 0.30 - 4.20 uIU/mL Final   10/27/2022 0.05 (L) 0.40 - 4.00 mU/L Final   08/04/2022 0.05 (L) 0.40 - 4.00 mU/L Final   06/21/2021 1.56 0.40 - 4.00 mU/L Final   07/30/2020 1.20 0.40 - 4.00 mU/L Final   11/05/2018 1.39 0.40 - 4.00 mU/L Final   07/24/2014 1.03 0.4 - 5.0 mU/L Final     Free T4   Date Value Ref Range Status   10/27/2022 1.45 0.76 - 1.46 ng/dL Final   08/04/2022 1.62 (H) 0.76 - 1.46 ng/dL Final       Creatinine   Date Value Ref Range Status   10/27/2022 0.68 0.52 - 1.04 mg/dL Final   09/24/2020 0.69 0.52 - 1.04 mg/dL Final   ]    Video-Visit Details    Type of service:  Video Visit  Joined the call at 10/9/2023, 1:40:36 pm.  Left the call at 10/9/2023, 1:48:23 pm.  You were on the call for 7 minutes 46 seconds .    Distant Location (provider location):  Off-site.     Platform used for Video Visit: eTec

## 2023-10-09 NOTE — PATIENT INSTRUCTIONS
Osteoporosis   Weight bearing Exercises  Fall prevention  Adequate Calcium and vitamin D intake: for maintenance calcium 1200 mg daily from all sources and vitamin D 1000 IU daily.      alendronate (FOSAMAX) 70 MG tablet 12 tablet    --   Sig - Route: Take 1 tablet (70 mg) by mouth every 7 days Take 60 minutes before am meal with 8 oz. water. Remain upright for 30 minutes. - Oral     Thyroid  Levothyroxine should be taken on empty stomach and wait at least 30 minutes before eating. Don't take supplements or multivitamins containing iron and calcium within 4 hours of taking levothyroxine tablet.     Schedule blood work and DEXA scan.   Orders Placed This Encounter   Procedures    Dexa hip/pelvis/spine    TSH with free T4 reflex    Vitamin D Deficiency (D3 Only)    C-Telopeptide, Beta-Cross-Linked

## 2023-10-09 NOTE — LETTER
10/9/2023         RE: Anuradha Horn  [address]  [facility] [address]        Dear Colleague,    Thank you for referring your patient, Anuradha Horn, to the [facility]. Please see a copy of my visit note below.    Endocrinology Clinic Visit    Chief Complaint: RECHECK (Follow up since surgery last year. )     Information obtained from:Patient      Assessment/Treatment Plan:      Osteoporosis without current fracture T-scores for lumbar spines involving L3-L4 -3.  Compared to previous study there is no significant change.   Risk factor is early or premature menopause 10 years ago & no HRT.  Right now already in the menopausal age and there is no HRT indication based on symptoms reviewed at this time.    Plan  To identify additional risk factors checked parathyroid hormone, screening for celiac disease and other routine labs which were within the normal limits.  Vitamin D has been checked previously and within acceptable range.      Adequate calcium and vitamin D discussed about calcium 1200 mg daily from all sources and vitamin D 1000 IUs daily.      Strengthening exercises like walking and other strengthening exercises.    Continue alendronate at the same dose.    Check follow up dexa scan and c-telopeptide.     Multiple thyroid nodules/hoarseness of voice/visible neck enlargement:  S/p thyroidectomy.   A.  THYROID GLAND, TOTAL THYROIDECTOMY:  -Follicular adenoma, located within right lobe, 3.2 cm in greatest dimension.  -Two benign colloid nodules in the left lobe, 0.7 cm and 0.5 cm, respectively.  -Background thyroid gland tissue with no other histopathologic abnormality.  -No evidence of malignancy identified.    Postoperative hypothyroidism: levothyroxine 125 mcg daily. TSH normal.  Levothyroxine should be taken on empty stomach and wait at least 30 minutes before eating. Don't take supplements or multivitamins containing iron and calcium within 4 hours of  taking levothyroxine tablet.     Question regarding hormone replacement therapy    Regarding your question of hormonal replacement therapy.  Hormonal replacement therapy is indicated irrespective of symptoms when people undergo early menopause as in your case.  However, once you are in that menopausal age group(right now]; hormone replacement therapy is recommended based on menopausal symptoms particularly the presence of hot flash or not.  For additional recommendations, please check with your gynecologist.  Please let me know if any questions.      David Barnard MD  Staff Endocrinologist    Division of Endocrinology and Diabetes  Subjective:         HPI: Anuradha Horn is a 53 year old female here to discuss osteoporosis management.    Hx of thyroid nodule; now s/p thyroidectomy. Pathology below.      A.  THYROID GLAND, TOTAL THYROIDECTOMY:  -Follicular adenoma, located within right lobe, 3.2 cm in greatest dimension.  -Two benign colloid nodules in the left lobe, 0.7 cm and 0.5 cm, respectively.  -Background thyroid gland tissue with no other histopathologic abnormality.  -No evidence of malignancy identified.    Currently on levothyroxine 125 mcg daily.     Osteoporosis currently on FOSAMAX. No issued from medications. 9/2020     :15Answers submitted by the patient for this visit:  Symptoms you have experienced in the last 30 days (Submitted on 10/8/2023)  General Symptoms: No  Skin Symptoms: No  HENT Symptoms: No  EYE SYMPTOMS: No  HEART SYMPTOMS: No  LUNG SYMPTOMS: No  INTESTINAL SYMPTOMS: No  URINARY SYMPTOMS: No  GYNECOLOGIC SYMPTOMS: No  BREAST SYMPTOMS: No  SKELETAL SYMPTOMS: No  BLOOD SYMPTOMS: No  NERVOUS SYSTEM SYMPTOMS: No  MENTAL HEALTH SYMPTOMS: No     A.  THYROID GLAND, TOTAL THYROIDECTOMY:  -Follicular adenoma, located within right lobe, 3.2 cm in greatest dimension.  -Two benign colloid nodules in the left lobe, 0.7 cm and 0.5 cm, respectively.  -Background thyroid gland tissue  with no other histopathologic abnormality.  -No evidence of malignancy identified.     No Known Allergies    Current Outpatient Medications   Medication Sig Dispense Refill     alendronate (FOSAMAX) 70 MG tablet Take 1 tablet (70 mg) by mouth every 7 days Take 60 minutes before am meal with 8 oz. water. Remain upright for 30 minutes. 12 tablet 3     Ascorbic Acid (VITAMIN C PO)        calcium citrate-vitamin D (CITRACAL W/D) 250-100 MG-UNIT tablet Take 2 tablets by mouth 2 times daily 180 tablet 3     estradiol (ESTRACE VAGINAL) 0.1 MG/GM vaginal cream 1 gm PV Q HS x 2 weeks, then 2-3 x's weekly thereafter 42.5 g 3     levothyroxine (SYNTHROID/LEVOTHROID) 125 MCG tablet Take 1 tablet (125 mcg) by mouth daily 90 tablet 0     Multiple Vitamins-Minerals (DAILY MULTI PO) Take 1 capsule by mouth daily         Review of Systems     as per HPI above    Objective:   LMP 04/15/2010   Constitutional: Pleasant no acute cardiopulmonary distress.   Psychological: appropriate mood and affect   In House Labs:       TSH   Date Value Ref Range Status   09/15/2023 0.46 0.30 - 4.20 uIU/mL Final   10/27/2022 0.05 (L) 0.40 - 4.00 mU/L Final   08/04/2022 0.05 (L) 0.40 - 4.00 mU/L Final   06/21/2021 1.56 0.40 - 4.00 mU/L Final   07/30/2020 1.20 0.40 - 4.00 mU/L Final   11/05/2018 1.39 0.40 - 4.00 mU/L Final   07/24/2014 1.03 0.4 - 5.0 mU/L Final     Free T4   Date Value Ref Range Status   10/27/2022 1.45 0.76 - 1.46 ng/dL Final   08/04/2022 1.62 (H) 0.76 - 1.46 ng/dL Final       Creatinine   Date Value Ref Range Status   10/27/2022 0.68 0.52 - 1.04 mg/dL Final   09/24/2020 0.69 0.52 - 1.04 mg/dL Final   ]    Video-Visit Details    Type of service:  Video Visit  Joined the call at 10/9/2023, 1:40:36 pm.  Left the call at 10/9/2023, 1:48:23 pm.  You were on the call for 7 minutes 46 seconds .    Distant Location (provider location):  Off-site.     Platform used for Video Visit: Ness      Again, thank you for allowing me to participate in  the care of your patient.        Sincerely,        David Barnard MD

## 2023-10-09 NOTE — NURSING NOTE
Is the patient currently in the state of [place]? YES    Visit mode:VIDEO    If the visit is dropped, the patient can be reconnected by: VIDEO VISIT: Text to cell phone:   Telephone Information:   Mobile [phone]       Will anyone else be joining the visit? NO  (If patient encounters technical issues they should call [phone])    How would you like to obtain your AVS? MyChart    Are changes needed to the allergy or medication list? Pt stated no changes to allergies and Pt stated no med changes    Reason for visit: RECHECK (Follow up since surgery last year. )    Jessica KERNS

## 2023-12-04 DIAGNOSIS — M81.0 OSTEOPOROSIS WITHOUT CURRENT PATHOLOGICAL FRACTURE, UNSPECIFIED OSTEOPOROSIS TYPE: ICD-10-CM

## 2023-12-04 RX ORDER — ALENDRONATE SODIUM 70 MG/1
70 TABLET ORAL
Qty: 12 TABLET | Refills: 0 | Status: SHIPPED | OUTPATIENT
Start: 2023-12-04 | End: 2024-03-04

## 2023-12-04 NOTE — TELEPHONE ENCOUNTER
alendronate (FOSAMAX) 70 MG tablet   Last Written Prescription Date:  10/5/2022  Last Fill Quantity: 12,   # refills: 3  Last Office Visit : 10/9/2023  Future Office visit:  10/15/2024    Routing refill request to provider for review/approval because:  Needing Abnormal Creatinine on file for this med.  Send to Provider for review             Recent Labs   Lab Test 10/27/22  1542   CR 0.68      Laine Pineda RN  Central Triage Red Flags/Med Refills

## 2024-02-02 ENCOUNTER — ANCILLARY PROCEDURE (OUTPATIENT)
Dept: MAMMOGRAPHY | Facility: CLINIC | Age: 54
End: 2024-02-02
Attending: FAMILY MEDICINE
Payer: COMMERCIAL

## 2024-02-02 DIAGNOSIS — Z12.31 VISIT FOR SCREENING MAMMOGRAM: ICD-10-CM

## 2024-02-02 PROCEDURE — 77067 SCR MAMMO BI INCL CAD: CPT | Performed by: RADIOLOGY

## 2024-02-02 PROCEDURE — 77063 BREAST TOMOSYNTHESIS BI: CPT | Performed by: RADIOLOGY

## 2024-02-03 ENCOUNTER — HEALTH MAINTENANCE LETTER (OUTPATIENT)
Age: 54
End: 2024-02-03

## 2024-03-02 ENCOUNTER — MYC MEDICAL ADVICE (OUTPATIENT)
Dept: ENDOCRINOLOGY | Facility: CLINIC | Age: 54
End: 2024-03-02
Payer: COMMERCIAL

## 2024-03-02 DIAGNOSIS — M81.0 OSTEOPOROSIS WITHOUT CURRENT PATHOLOGICAL FRACTURE, UNSPECIFIED OSTEOPOROSIS TYPE: ICD-10-CM

## 2024-03-04 RX ORDER — ALENDRONATE SODIUM 70 MG/1
70 TABLET ORAL
Qty: 12 TABLET | Refills: 0 | Status: SHIPPED | OUTPATIENT
Start: 2024-03-04 | End: 2024-03-18

## 2024-03-04 NOTE — TELEPHONE ENCOUNTER
alendronate (FOSAMAX) 70 MG tablet   12 tablet 0 12/4/2023 0/9/2023  [facility] David Seymour MD  Endocrinology, Diabetes, and Metabolism      10/27/22      Creatinine  0.52 - 1.04 mg/dL 0.68     Creatinine clearance,   132 mL/min  Creatinine clearance, original Cockcroft-Gault  120 mL/min  Creatinine clearance modified for overweight patient, using adjusted body weight of 78 kg (173 lbs).  111.7-119.7 mL/min  Note: This range uses IBW and adjusted body weight. Controversy exists over which form of weight to use.    Routed because:   Bisphosphonates [id number]/04/2024 12:33 PM   Protocol Details Normal Creatinine Clearance  within past 12 months

## 2024-03-08 DIAGNOSIS — M81.0 OSTEOPOROSIS WITHOUT CURRENT PATHOLOGICAL FRACTURE, UNSPECIFIED OSTEOPOROSIS TYPE: ICD-10-CM

## 2024-03-14 NOTE — TELEPHONE ENCOUNTER
alendronate (FOSAMAX) 70 MG tablet 12 tablet 0 3/4/2024       Last Office Visit: 10/9/23  Future Office visit:  10/15/23       Unable to compute creatinine clearance, last creatinine 10/27/2022    Routing refill request to provider for review/approval because:  Labs > one year old    Bere Albarado RN  P Red Flag Triage/MRT

## 2024-03-18 RX ORDER — ALENDRONATE SODIUM 70 MG/1
70 TABLET ORAL
Qty: 12 TABLET | Refills: 3 | Status: SHIPPED | OUTPATIENT
Start: 2024-03-18 | End: 2024-05-29

## 2024-05-29 ENCOUNTER — MYC REFILL (OUTPATIENT)
Dept: ENDOCRINOLOGY | Facility: CLINIC | Age: 54
End: 2024-05-29
Payer: COMMERCIAL

## 2024-05-29 DIAGNOSIS — M81.0 OSTEOPOROSIS WITHOUT CURRENT PATHOLOGICAL FRACTURE, UNSPECIFIED OSTEOPOROSIS TYPE: ICD-10-CM

## 2024-05-29 RX ORDER — ALENDRONATE SODIUM 70 MG/1
70 TABLET ORAL
Qty: 12 TABLET | Refills: 3 | Status: SHIPPED | OUTPATIENT
Start: 2024-05-29

## 2024-05-29 NOTE — TELEPHONE ENCOUNTER
Requested Renewals     alendronate (FOSAMAX) 70 MG tablet         Sig: Take 1 tablet (70 mg) by mouth every 7 days Take 60 minutes before am meal with 8 oz. water. Remain upright for 30 minutes.    Disp: 12 tablet    Refills: 3    Start: 5/29/2024    Class: E-Prescribe    Non-formulary For: Osteoporosis without current pathological fracture, unspecified osteoporosis type    Last ordered: 2 months ago (3/18/2024) by David Barnard MD    Bisphosphonates [id number]/29/2024 09:47 AM   Protocol Details     Most recent Creatinine Clearance in last 12 months >35    Dexa scan completed in the past 48-months    Medication is active on med list    Medication indicated for associated diagnosis    Recent (12 mo) or future (90 days) visit within the authorizing provider's specialty    Patient is age 18 or older      To be filled at: [facility] #[id number] [facility] [address] AT [facility]            Last office visit with Dr. Barnard on 10/9/23. Future yearly visit with Dr. Barnard on 10/15/2024.    Due to protocol failure, will send to Dr. Barnard to review/sign.      Ciera Whatley RN  Endocrine Care Coordinator  [facility]

## 2024-10-15 ENCOUNTER — OFFICE VISIT (OUTPATIENT)
Dept: ENDOCRINOLOGY | Facility: CLINIC | Age: 54
End: 2024-10-15
Payer: COMMERCIAL

## 2024-10-15 VITALS
BODY MASS INDEX: 28.64 KG/M2 | WEIGHT: 189 LBS | HEIGHT: 68 IN | OXYGEN SATURATION: 98 % | SYSTOLIC BLOOD PRESSURE: 106 MMHG | DIASTOLIC BLOOD PRESSURE: 70 MMHG | HEART RATE: 66 BPM

## 2024-10-15 DIAGNOSIS — M81.0 OSTEOPOROSIS WITHOUT CURRENT PATHOLOGICAL FRACTURE, UNSPECIFIED OSTEOPOROSIS TYPE: ICD-10-CM

## 2024-10-15 DIAGNOSIS — E89.0 POSTOPERATIVE HYPOTHYROIDISM: ICD-10-CM

## 2024-10-15 LAB
ALBUMIN SERPL BCG-MCNC: 4.3 G/DL (ref 3.5–5.2)
ANION GAP SERPL CALCULATED.3IONS-SCNC: 9 MMOL/L (ref 7–15)
BUN SERPL-MCNC: 10.8 MG/DL (ref 6–20)
CALCIUM SERPL-MCNC: 9.2 MG/DL (ref 8.8–10.4)
CHLORIDE SERPL-SCNC: 106 MMOL/L (ref 98–107)
CREAT SERPL-MCNC: 0.78 MG/DL (ref 0.51–0.95)
EGFRCR SERPLBLD CKD-EPI 2021: 90 ML/MIN/1.73M2
GLUCOSE SERPL-MCNC: 81 MG/DL (ref 70–99)
HCO3 SERPL-SCNC: 26 MMOL/L (ref 22–29)
PHOSPHATE SERPL-MCNC: 2.9 MG/DL (ref 2.5–4.5)
POTASSIUM SERPL-SCNC: 4.1 MMOL/L (ref 3.4–5.3)
SODIUM SERPL-SCNC: 141 MMOL/L (ref 135–145)
T4 FREE SERPL-MCNC: 2.1 NG/DL (ref 0.9–1.7)
TSH SERPL DL<=0.005 MIU/L-ACNC: 0.02 UIU/ML (ref 0.3–4.2)
VIT D+METAB SERPL-MCNC: 34 NG/ML (ref 20–50)

## 2024-10-15 PROCEDURE — 36415 COLL VENOUS BLD VENIPUNCTURE: CPT | Performed by: INTERNAL MEDICINE

## 2024-10-15 PROCEDURE — 80069 RENAL FUNCTION PANEL: CPT | Performed by: INTERNAL MEDICINE

## 2024-10-15 PROCEDURE — 82306 VITAMIN D 25 HYDROXY: CPT | Performed by: INTERNAL MEDICINE

## 2024-10-15 PROCEDURE — 84439 ASSAY OF FREE THYROXINE: CPT | Performed by: INTERNAL MEDICINE

## 2024-10-15 PROCEDURE — 84443 ASSAY THYROID STIM HORMONE: CPT | Performed by: INTERNAL MEDICINE

## 2024-10-15 PROCEDURE — G2211 COMPLEX E/M VISIT ADD ON: HCPCS | Performed by: INTERNAL MEDICINE

## 2024-10-15 PROCEDURE — 99214 OFFICE O/P EST MOD 30 MIN: CPT | Performed by: INTERNAL MEDICINE

## 2024-10-15 RX ORDER — ALENDRONATE SODIUM 70 MG/1
70 TABLET ORAL
Qty: 12 TABLET | Refills: 3 | Status: SHIPPED | OUTPATIENT
Start: 2024-10-15

## 2024-10-15 RX ORDER — LEVOTHYROXINE SODIUM 112 UG/1
112 TABLET ORAL DAILY
Qty: 90 TABLET | Refills: 4 | Status: SHIPPED | OUTPATIENT
Start: 2024-10-15

## 2024-10-15 RX ORDER — LEVOTHYROXINE SODIUM 125 UG/1
125 TABLET ORAL DAILY
Qty: 90 TABLET | Refills: 4 | Status: SHIPPED | OUTPATIENT
Start: 2024-10-15 | End: 2024-10-15

## 2024-10-15 NOTE — PATIENT INSTRUCTIONS
24 hour urine collection to check calcium.   a day; if Sunday discard the first urine on Sunday morning and collect all other urine all day Sunday and night; including morning urine of Monday morning. Bring it back to the laboratory Monday morning after completion of urine collection.         Welcome to the [facility] Endocrinology and Diabetes Clinics     Our Endocrinology Clinics are here to provide you with a team-based, collaborative approach in the diagnosis and treatment of patients with diabetes and endocrine disorders. The team is made up of Physicians, Physician Assistants, Certified Diabetes Educators, Registered Nurses, Medical Assistants, Emergency Medical Technicians, and many others, all of whom have the unified goal of providing our patients with high quality care.     Please see below for some helpful tips to best navigate and use the [facility] Endocrinology clinic:     [place] Respect: At [facility], we are committed to a respectful and safe space for all patients, visitors, and staff.  We believe that mutual respect between patients and their care team is the foundation of quality care.  It is our expectation that you will be treated with respect by your care team.  In turn, we ask that all communication with the care team (written and verbal) be respectful and free from profanity, threatening, or abusive language.  Disrespectful communication undermines our therapeutic relationship with you and may result in us being unable to continue to provide your care.    Refills: A provider must see you at least annually to prescribe and refill medications. This is to ensure your safety as well as meet insurance and compliance regulations.    Scheduling: Many of our Providers offer both in-person or video visits. Please call to schedule any needed follow ups as soon as possible because our provider schedules fill up very quickly. Our care team has the right to require an  in-person visit when they believe that it is medically necessary. Please remember that for any virtual visits, you must be in the state [facility] at the time of the visit, otherwise we are unable to see you and you will need to be rescheduled.    Missed Appointments: If you need to cancel or miss your scheduled appointment, please call the clinic at [phone] to reschedule.  Please note if you repeatedly miss appointments or repeatedly miss appointments without calling to inform us ahead of time (no-show), the clinic may elect to not allow you to reschedule without speaking to a manager, may require a Partnership In Care Agreement prior to rescheduling, or could result in you no longer being able to receive care from the clinic. Providing the clinic with timely notification if you have to miss an appointment, allows us to better serve the needs of all of our patients.    Primary Care Provider: Our Endocrinologists are Specialists in their field. We expect you to have a Primary Care Provider established to handle any needs outside of your diabetes and endocrine care.  We would be happy to assist you find a Primary Care Provider, if you do not have one.    Captual: Captual is a wonderful resource that allows you access to your Care Team via online or the christopher. Please ask a member of the team if you would like help creating an account. Please note that it may take up to 2 business days for a response. Captual messages are not reviewed on weekends or after business hours.  Emergent or urgent care needs should never be communicated via Captual.  If you experience a medical emergency call 911 or go to the nearest emergency room.    Labs: It is recommended that you stay within the [facility] for labs but you are welcome to obtain ordered labs (with some exceptions) from any location of your choice as long as they are able to complete and process the needed labs. If you need us to fax orders to your  preferred lab, please provide us the name and fax number of the lab you would like to go to so we can fax the orders. If your labs are drawn outside of the [facility], please have them fax the results to [phone] ([place]) or [phone] ([place]) or via [facility]. It is your responsibility to ensure that outside lab results are sent to us.    We look forward to working with you. Please do not hesitate to reach out with any questions.    Thank you,    The Endocrine Team    [facility] Address:   [place] Address:     [address] [place] [address]    Phone: [phone]  Fax: [phone] [address] [place] [address]    Phone: [phone]  Fax: [phone]     [facility] Cost Estimate Phone Number: [phone]    General Lab and Imaging Scheduling Phone Number: [phone]

## 2024-10-15 NOTE — LETTER
10/15/2024      Anuradha Horn  [address]  [place] [facility] [address]      Dear Colleague,    Thank you for referring your patient, Anuradha Horn, to the [facility]. Please see a copy of my visit note below.    Endocrinology Clinic Visit    Chief Complaint: Follow Up, Osteoporosis, and Thyroid Disease     Information obtained from:Patient      Assessment/Treatment Plan:      Osteoporosis without current fracture T-scores for lumbar spines involving L3-L4 -3.  Compared to previous study there is no significant change.   Risk factor is early or premature menopause 10+ years ago & no HRT.  HRT started few months ago.     Plan  To identify additional risk factors checked parathyroid hormone, screening for celiac disease and other routine labs which were within the normal limits.  Vitamin D has been checked previously and within acceptable range.  Check 24 hour urine for calcium.     Adequate calcium and vitamin D discussed about calcium 1200 mg daily from all sources and vitamin D 1000 IUs daily.      Strengthening exercises like walking and other strengthening exercises.    Continue alendronate at the same dose.    Check follow up dexa scan and c-telopeptide. Will determine next steps based on results.     Multiple thyroid nodules/hoarseness of voice/visible neck enlargement:  S/p thyroidectomy.   A.  THYROID GLAND, TOTAL THYROIDECTOMY:  -Follicular adenoma, located within right lobe, 3.2 cm in greatest dimension.  -Two benign colloid nodules in the left lobe, 0.7 cm and 0.5 cm, respectively.  -Background thyroid gland tissue with no other histopathologic abnormality.  -No evidence of malignancy identified.    Postoperative hypothyroidism: levothyroxine 125 mcg daily. TSH suppressed today.  Reduce levothyroxine to 112 mcg daily. Levothyroxine should be taken on empty stomach and wait at least 30 minutes before eating. Don't take supplements or multivitamins containing iron  and calcium within 4 hours of taking levothyroxine tablet.     Hormone replacement therapy - estradiol 0.375 & progesterone 100 mg.        David Barnard MD  Staff Endocrinologist    Division of Endocrinology and Diabetes    Subjective:         HPI: Anuradha Horn is a 54 year old female here to discuss osteoporosis management.    Hx of thyroid nodule; now s/p thyroidectomy. Pathology below.      A.  THYROID GLAND, TOTAL THYROIDECTOMY:  -Follicular adenoma, located within right lobe, 3.2 cm in greatest dimension.  -Two benign colloid nodules in the left lobe, 0.7 cm and 0.5 cm, respectively.  -Background thyroid gland tissue with no other histopathologic abnormality.  -No evidence of malignancy identified.    Currently on levothyroxine 125 mcg daily.     Osteoporosis currently on FOSAMAX. No issued from medications. Medication started 7/2021     :General Symptoms: No  Skin Symptoms: No  HENT Symptoms: No  EYE SYMPTOMS: No  HEART SYMPTOMS: No  LUNG SYMPTOMS: No  INTESTINAL SYMPTOMS: No  URINARY SYMPTOMS: No  GYNECOLOGIC SYMPTOMS: No  BREAST SYMPTOMS: No  SKELETAL SYMPTOMS: No  BLOOD SYMPTOMS: No  NERVOUS SYSTEM SYMPTOMS: No  MENTAL HEALTH SYMPTOMS: No     A.  THYROID GLAND, TOTAL THYROIDECTOMY:  -Follicular adenoma, located within right lobe, 3.2 cm in greatest dimension.  -Two benign colloid nodules in the left lobe, 0.7 cm and 0.5 cm, respectively.  -Background thyroid gland tissue with no other histopathologic abnormality.  -No evidence of malignancy identified.     No Known Allergies    Current Outpatient Medications   Medication Sig Dispense Refill     alendronate (FOSAMAX) 70 MG tablet Take 1 tablet (70 mg) by mouth every 7 days Take 60 minutes before am meal with 8 oz. water. Remain upright for 30 minutes. 12 tablet 3     Ascorbic Acid (VITAMIN C PO)        calcium citrate-vitamin D (CITRACAL W/D) 250-100 MG-UNIT tablet Take 2 tablets by mouth 2 times daily 180 tablet 3     estradiol (ESTRACE  "VAGINAL) 0.1 MG/GM vaginal cream 1 gm PV Q HS x 2 weeks, then 2-3 x's weekly thereafter 42.5 g 3     levothyroxine (SYNTHROID/LEVOTHROID) 125 MCG tablet Take 1 tablet (125 mcg) by mouth daily 90 tablet 4     Multiple Vitamins-Minerals (DAILY MULTI PO) Take 1 capsule by mouth daily         Review of Systems     as per HPI above    Objective:   /70 (BP Location: Left arm, Patient Position: Sitting, Cuff Size: Adult Large)   Pulse 66   Ht 1.725 m (5' 7.91\")   Wt 85.7 kg (189 lb)   LMP 04/15/2010   SpO2 98%   BMI 28.81 kg/m    Constitutional: Pleasant no acute cardiopulmonary distress.   EYES: anicteric, normal extra-ocular movements, no lid lag or retraction, is equal and reactive to light bilaterally.  HEENT: Mouth/Throat: Mucous membrane is moist. Oropharynx is clear.  Thyroid examination: absent surgically.    Cardiovascular: RRR, S1, S2 normal.   Pulmonary/Chest: CTAB. No wheezing or rales.   Abdominal: +BS. Non tender to palpation.  Stretch marks: none.   Neurological: Alert and oriented.  No tremor and reflexes are symmetrical bilaterally and within the normal limits. Muscle strength 5/5.   Extremities: No edema.  Psychological: appropriate mood and affect.    In House Labs:       TSH   Date Value Ref Range Status   09/15/2023 0.46 0.30 - 4.20 uIU/mL Final   10/27/2022 0.05 (L) 0.40 - 4.00 mU/L Final   08/04/2022 0.05 (L) 0.40 - 4.00 mU/L Final   06/21/2021 1.56 0.40 - 4.00 mU/L Final   07/30/2020 1.20 0.40 - 4.00 mU/L Final   11/05/2018 1.39 0.40 - 4.00 mU/L Final   07/24/2014 1.03 0.4 - 5.0 mU/L Final     Free T4   Date Value Ref Range Status   10/27/2022 1.45 0.76 - 1.46 ng/dL Final   08/04/2022 1.62 (H) 0.76 - 1.46 ng/dL Final       Creatinine   Date Value Ref Range Status   10/27/2022 0.68 0.52 - 1.04 mg/dL Final   09/24/2020 0.69 0.52 - 1.04 mg/dL Final     Component      Latest Ref Rng 10/15/2024  9:59 AM   Sodium      135 - 145 mmol/L 141    Potassium      3.4 - 5.3 mmol/L 4.1    Chloride      " 98 - 107 mmol/L 106    Carbon Dioxide (CO2)      22 - 29 mmol/L 26    Anion Gap      7 - 15 mmol/L 9    Urea Nitrogen      6.0 - 20.0 mg/dL 10.8    Creatinine      0.51 - 0.95 mg/dL 0.78    GFR Estimate      >60 mL/min/1.73m2 90    Calcium      8.8 - 10.4 mg/dL 9.2    Glucose      70 - 99 mg/dL 81    TSH      0.30 - 4.20 uIU/mL 0.02 (L)    Vitamin D, Total (25-Hydroxy)      20 - 50 ng/mL 34    Albumin      3.5 - 5.2 g/dL 4.3    Phosphorus      2.5 - 4.5 mg/dL 2.9    T4 Free      0.90 - 1.70 ng/dL 2.10 (H)          Latest Ref Rng 9/24/2020  1:29 PM   ENDO CALCIUM LABS-[facility]     Vitamin D Deficiency screening 20 - 75 ug/L    Albumin 3.4 - 5.0 g/dL 4.0    Alkaline Phosphatase 40 - 150 U/L 78    Calcium 8.5 - 10.1 mg/dL 9.3    Urea Nitrogen 7 - 30 mg/dL 17    Creatinine 0.52 - 1.04 mg/dL 0.69    Parathyroid Hormone Intact 15 - 65 pg/mL 36      The longitudinal plan of care for the diagnosis(es)/condition(s) as documented were addressed during this visit. Due to the added complexity in care, I will continue to support Anuradha in the subsequent management and with ongoing continuity of care.      Again, thank you for allowing me to participate in the care of your patient.        Sincerely,        David Barnard MD

## 2024-10-15 NOTE — PROGRESS NOTES
Endocrinology Clinic Visit    Chief Complaint: Follow Up, Osteoporosis, and Thyroid Disease     Information obtained from:Patient      Assessment/Treatment Plan:      Osteoporosis without current fracture T-scores for lumbar spines involving L3-L4 -3.  Compared to previous study there is no significant change.   Risk factor is early or premature menopause 10+ years ago & no HRT.  HRT started few months ago.     Plan  To identify additional risk factors checked parathyroid hormone, screening for celiac disease and other routine labs which were within the normal limits.  Vitamin D has been checked previously and within acceptable range.  Check 24 hour urine for calcium.     Adequate calcium and vitamin D discussed about calcium 1200 mg daily from all sources and vitamin D 1000 IUs daily.      Strengthening exercises like walking and other strengthening exercises.    Continue alendronate at the same dose.    Check follow up dexa scan and c-telopeptide. Will determine next steps based on results.     Multiple thyroid nodules/hoarseness of voice/visible neck enlargement:  S/p thyroidectomy.   A.  THYROID GLAND, TOTAL THYROIDECTOMY:  -Follicular adenoma, located within right lobe, 3.2 cm in greatest dimension.  -Two benign colloid nodules in the left lobe, 0.7 cm and 0.5 cm, respectively.  -Background thyroid gland tissue with no other histopathologic abnormality.  -No evidence of malignancy identified.    Postoperative hypothyroidism: levothyroxine 125 mcg daily. TSH suppressed today.  Reduce levothyroxine to 112 mcg daily. Levothyroxine should be taken on empty stomach and wait at least 30 minutes before eating. Don't take supplements or multivitamins containing iron and calcium within 4 hours of taking levothyroxine tablet.     Hormone replacement therapy - estradiol 0.375 & progesterone 100 mg.        David Barnard MD  Staff Endocrinologist    Division of Endocrinology and Diabetes    Subjective:         HPI:  Anuradha Horn is a 54 year old female here to discuss osteoporosis management.    Hx of thyroid nodule; now s/p thyroidectomy. Pathology below.      A.  THYROID GLAND, TOTAL THYROIDECTOMY:  -Follicular adenoma, located within right lobe, 3.2 cm in greatest dimension.  -Two benign colloid nodules in the left lobe, 0.7 cm and 0.5 cm, respectively.  -Background thyroid gland tissue with no other histopathologic abnormality.  -No evidence of malignancy identified.    Currently on levothyroxine 125 mcg daily.     Osteoporosis currently on FOSAMAX. No issued from medications. Medication started 7/2021     :General Symptoms: No  Skin Symptoms: No  HENT Symptoms: No  EYE SYMPTOMS: No  HEART SYMPTOMS: No  LUNG SYMPTOMS: No  INTESTINAL SYMPTOMS: No  URINARY SYMPTOMS: No  GYNECOLOGIC SYMPTOMS: No  BREAST SYMPTOMS: No  SKELETAL SYMPTOMS: No  BLOOD SYMPTOMS: No  NERVOUS SYSTEM SYMPTOMS: No  MENTAL HEALTH SYMPTOMS: No     A.  THYROID GLAND, TOTAL THYROIDECTOMY:  -Follicular adenoma, located within right lobe, 3.2 cm in greatest dimension.  -Two benign colloid nodules in the left lobe, 0.7 cm and 0.5 cm, respectively.  -Background thyroid gland tissue with no other histopathologic abnormality.  -No evidence of malignancy identified.     No Known Allergies    Current Outpatient Medications   Medication Sig Dispense Refill    alendronate (FOSAMAX) 70 MG tablet Take 1 tablet (70 mg) by mouth every 7 days Take 60 minutes before am meal with 8 oz. water. Remain upright for 30 minutes. 12 tablet 3    Ascorbic Acid (VITAMIN C PO)       calcium citrate-vitamin D (CITRACAL W/D) 250-100 MG-UNIT tablet Take 2 tablets by mouth 2 times daily 180 tablet 3    estradiol (ESTRACE VAGINAL) 0.1 MG/GM vaginal cream 1 gm PV Q HS x 2 weeks, then 2-3 x's weekly thereafter 42.5 g 3    levothyroxine (SYNTHROID/LEVOTHROID) 125 MCG tablet Take 1 tablet (125 mcg) by mouth daily 90 tablet 4    Multiple Vitamins-Minerals (DAILY MULTI PO) Take 1  "capsule by mouth daily         Review of Systems     as per HPI above    Objective:   /70 (BP Location: Left arm, Patient Position: Sitting, Cuff Size: Adult Large)   Pulse 66   Ht 1.725 m (5' 7.91\")   Wt 85.7 kg (189 lb)   LMP 04/15/2010   SpO2 98%   BMI 28.81 kg/m    Constitutional: Pleasant no acute cardiopulmonary distress.   EYES: anicteric, normal extra-ocular movements, no lid lag or retraction, is equal and reactive to light bilaterally.  HEENT: Mouth/Throat: Mucous membrane is moist. Oropharynx is clear.  Thyroid examination: absent surgically.    Cardiovascular: RRR, S1, S2 normal.   Pulmonary/Chest: CTAB. No wheezing or rales.   Abdominal: +BS. Non tender to palpation.  Stretch marks: none.   Neurological: Alert and oriented.  No tremor and reflexes are symmetrical bilaterally and within the normal limits. Muscle strength 5/5.   Extremities: No edema.  Psychological: appropriate mood and affect.    In House Labs:       TSH   Date Value Ref Range Status   09/15/2023 0.46 0.30 - 4.20 uIU/mL Final   10/27/2022 0.05 (L) 0.40 - 4.00 mU/L Final   08/04/2022 0.05 (L) 0.40 - 4.00 mU/L Final   06/21/2021 1.56 0.40 - 4.00 mU/L Final   07/30/2020 1.20 0.40 - 4.00 mU/L Final   11/05/2018 1.39 0.40 - 4.00 mU/L Final   07/24/2014 1.03 0.4 - 5.0 mU/L Final     Free T4   Date Value Ref Range Status   10/27/2022 1.45 0.76 - 1.46 ng/dL Final   08/04/2022 1.62 (H) 0.76 - 1.46 ng/dL Final       Creatinine   Date Value Ref Range Status   10/27/2022 0.68 0.52 - 1.04 mg/dL Final   09/24/2020 0.69 0.52 - 1.04 mg/dL Final     Component      Latest Ref Rng 10/15/2024  9:59 AM   Sodium      135 - 145 mmol/L 141    Potassium      3.4 - 5.3 mmol/L 4.1    Chloride      98 - 107 mmol/L 106    Carbon Dioxide (CO2)      22 - 29 mmol/L 26    Anion Gap      7 - 15 mmol/L 9    Urea Nitrogen      6.0 - 20.0 mg/dL 10.8    Creatinine      0.51 - 0.95 mg/dL 0.78    GFR Estimate      >60 mL/min/1.73m2 90    Calcium      8.8 - 10.4 " mg/dL 9.2    Glucose      70 - 99 mg/dL 81    TSH      0.30 - 4.20 uIU/mL 0.02 (L)    Vitamin D, Total (25-Hydroxy)      20 - 50 ng/mL 34    Albumin      3.5 - 5.2 g/dL 4.3    Phosphorus      2.5 - 4.5 mg/dL 2.9    T4 Free      0.90 - 1.70 ng/dL 2.10 (H)          Latest Ref Rng 9/24/2020  1:29 PM   [facility]     Vitamin D Deficiency screening 20 - 75 ug/L    Albumin 3.4 - 5.0 g/dL 4.0    Alkaline Phosphatase 40 - 150 U/L 78    Calcium 8.5 - 10.1 mg/dL 9.3    Urea Nitrogen 7 - 30 mg/dL 17    Creatinine 0.52 - 1.04 mg/dL 0.69    Parathyroid Hormone Intact 15 - 65 pg/mL 36      The longitudinal plan of care for the diagnosis(es)/condition(s) as documented were addressed during this visit. Due to the added complexity in care, I will continue to support Anuradha in the subsequent management and with ongoing continuity of care.

## 2024-10-15 NOTE — NURSING NOTE
"Anuradha Horn's goals for this visit include:   Chief Complaint   Patient presents with    Follow Up    Osteoporosis     She requests these members of her care team be copied on today's visit information: Yes     PCP: Laine Canas    Referring Provider:  Referred Self, MD  No address on file    /70 (BP Location: Left arm, Patient Position: Sitting, Cuff Size: Adult Large)   Pulse 66   Ht 1.725 m (5' 7.91\")   Wt 85.7 kg (189 lb)   LMP 04/15/2010   SpO2 98%   BMI 28.81 kg/m      Do you need any medication refills at today's visit? yes    Kayla Palm, RACHELE  Adult Endocrinology   [facility]     "

## 2024-11-09 SDOH — HEALTH STABILITY: PHYSICAL HEALTH: ON AVERAGE, HOW MANY MINUTES DO YOU ENGAGE IN EXERCISE AT THIS LEVEL?: 30 MIN

## 2024-11-09 SDOH — HEALTH STABILITY: PHYSICAL HEALTH: ON AVERAGE, HOW MANY DAYS PER WEEK DO YOU ENGAGE IN MODERATE TO STRENUOUS EXERCISE (LIKE A BRISK WALK)?: 3 DAYS

## 2024-11-09 ASSESSMENT — SOCIAL DETERMINANTS OF HEALTH (SDOH): HOW OFTEN DO YOU GET TOGETHER WITH FRIENDS OR RELATIVES?: ONCE A WEEK

## 2024-11-14 ENCOUNTER — OFFICE VISIT (OUTPATIENT)
Dept: FAMILY MEDICINE | Facility: CLINIC | Age: 54
End: 2024-11-14
Payer: COMMERCIAL

## 2024-11-14 VITALS
DIASTOLIC BLOOD PRESSURE: 76 MMHG | BODY MASS INDEX: 27.4 KG/M2 | TEMPERATURE: 97.4 F | RESPIRATION RATE: 14 BRPM | HEIGHT: 69 IN | OXYGEN SATURATION: 97 % | WEIGHT: 185 LBS | SYSTOLIC BLOOD PRESSURE: 114 MMHG | HEART RATE: 68 BPM

## 2024-11-14 DIAGNOSIS — Z12.83 SKIN CANCER SCREENING: ICD-10-CM

## 2024-11-14 DIAGNOSIS — Z13.220 SCREENING CHOLESTEROL LEVEL: ICD-10-CM

## 2024-11-14 DIAGNOSIS — Z00.00 ROUTINE GENERAL MEDICAL EXAMINATION AT A HEALTH CARE FACILITY: Primary | ICD-10-CM

## 2024-11-14 DIAGNOSIS — Z79.890 HORMONE REPLACEMENT THERAPY (HRT): ICD-10-CM

## 2024-11-14 DIAGNOSIS — Z13.1 SCREENING FOR DIABETES MELLITUS: ICD-10-CM

## 2024-11-14 DIAGNOSIS — E89.0 POSTOPERATIVE HYPOTHYROIDISM: ICD-10-CM

## 2024-11-14 DIAGNOSIS — M81.0 OSTEOPOROSIS WITHOUT CURRENT PATHOLOGICAL FRACTURE, UNSPECIFIED OSTEOPOROSIS TYPE: ICD-10-CM

## 2024-11-14 DIAGNOSIS — K21.00 GASTROESOPHAGEAL REFLUX DISEASE WITH ESOPHAGITIS WITHOUT HEMORRHAGE: ICD-10-CM

## 2024-11-14 LAB
CHOLEST SERPL-MCNC: 201 MG/DL
FASTING STATUS PATIENT QL REPORTED: YES
FASTING STATUS PATIENT QL REPORTED: YES
GLUCOSE SERPL-MCNC: 91 MG/DL (ref 70–99)
HDLC SERPL-MCNC: 69 MG/DL
LDLC SERPL CALC-MCNC: 117 MG/DL
NONHDLC SERPL-MCNC: 132 MG/DL
TRIGL SERPL-MCNC: 75 MG/DL

## 2024-11-14 PROCEDURE — 91320 SARSCV2 VAC 30MCG TRS-SUC IM: CPT | Performed by: FAMILY MEDICINE

## 2024-11-14 PROCEDURE — 90480 ADMN SARSCOV2 VAC 1/ONLY CMP: CPT | Performed by: FAMILY MEDICINE

## 2024-11-14 PROCEDURE — 80061 LIPID PANEL: CPT | Performed by: FAMILY MEDICINE

## 2024-11-14 PROCEDURE — 99396 PREV VISIT EST AGE 40-64: CPT | Performed by: FAMILY MEDICINE

## 2024-11-14 PROCEDURE — 36415 COLL VENOUS BLD VENIPUNCTURE: CPT | Performed by: FAMILY MEDICINE

## 2024-11-14 PROCEDURE — 82947 ASSAY GLUCOSE BLOOD QUANT: CPT | Performed by: FAMILY MEDICINE

## 2024-11-14 RX ORDER — ESTRADIOL 0.04 MG/D
1 PATCH, EXTENDED RELEASE TRANSDERMAL
COMMUNITY
Start: 2024-10-09

## 2024-11-14 RX ORDER — PROGESTERONE 100 MG/1
100 CAPSULE ORAL AT BEDTIME
COMMUNITY

## 2024-11-14 ASSESSMENT — PAIN SCALES - GENERAL: PAINLEVEL_OUTOF10: NO PAIN (0)

## 2024-11-14 NOTE — PROGRESS NOTES
"Preventive Care Visit  [facility]  Laine Chinyere Canas MD, Family Medicine  Nov 14, 2024      Assessment & Plan     Routine general medical examination at a health care facility  COVID-19 vaccine given today.    Screening cholesterol level  - Lipid panel reflex to direct LDL Fasting; Future  - Lipid panel reflex to direct LDL Fasting    Screening for diabetes mellitus  - Glucose; Future  - Glucose    Skin cancer screening  - Adult Dermatology  Referral; Future    Osteoporosis without current pathological fracture, unspecified osteoporosis type  Postoperative hypothyroidism  Follows with endocrinology, on Fosamax and levothyroxine.    Gastroesophageal reflux disease with esophagitis without hemorrhage  Infrequent symptoms triggering hoarseness for her.  We reviewed elevating head of bed and other lifestyle measures to reduce symptoms.  Certainly could consider PPI if needed for more persistent symptoms in the future.    Hormone replacement therapy (HRT)  Following with OB/GYN.  We discussed vaginal estrogen can still be used but may not need as often and to reduce frequency as needed.            BMI  Estimated body mass index is 27.72 kg/m  as calculated from the following:    Height as of this encounter: 1.74 m (5' 8.5\").    Weight as of this encounter: 83.9 kg (185 lb).   Weight management plan: Discussed healthy diet and exercise guidelines    Counseling  Appropriate preventive services were addressed with this patient via screening, questionnaire, or discussion as appropriate for fall prevention, nutrition, physical activity, Tobacco-use cessation, social engagement, weight loss and cognition.  Checklist reviewing preventive services available has been given to the patient.  Reviewed patient's diet, addressing concerns and/or questions.   She is at risk for lack of exercise and has been provided with information to increase physical activity for the benefit of her " well-being.           Subjective   Anuradha is a 54 year old, presenting for the following:  Physical         No data to display                   HPI    Discussed genetic screening for breast cancer- prefers to wait on this.   Derm left practice and needing new provider.     Tolerating fosamax. Dexa scan planned in Dec.     Started estrogen/progesterone, [facility] clinic in [place], [place] [facility].     Hoarsness eval with ent last year. Thought to be due to infrequent gerd. Sx's are random and may be a few days a month    Health Care Directive  Patient does not have a Health Care Directive: Discussed advance care planning with patient; however, patient declined at this time.      11/9/2024   General Health   How would you rate your overall physical health? Good   Feel stress (tense, anxious, or unable to sleep) Only a little      (!) STRESS CONCERN      11/9/2024   Nutrition   Three or more servings of calcium each day? Yes   Diet: Regular (no restrictions)   How many servings of fruit and vegetables per day? 4 or more   How many sweetened beverages each day? 0-1            11/9/2024   Exercise   Days per week of moderate/strenous exercise 3 days   Average minutes spent exercising at this level 30 min, walking            11/9/2024   Social Factors   Frequency of gathering with friends or relatives Once a week   Worry food won't last until get money to buy more No   Food not last or not have enough money for food? No   Do you have housing? (Housing is defined as stable permanent housing and does not include staying ouside in a car, in a tent, in an abandoned building, in an overnight shelter, or couch-surfing.) Yes   Are you worried about losing your housing? No   Lack of transportation? No   Unable to get utilities (heat,electricity)? No            11/9/2024   Fall Risk   Fallen 2 or more times in the past year? No    Trouble with walking or balance? No        Patient-reported          11/9/2024   Dental    Dentist two times every year? Yes            11/9/2024   TB Screening   Were you born outside of the [place]? No            Today's PHQ-2 Score:       11/14/2024     7:57 AM   PHQ-2 ( 1999 [employer])   Q1: Little interest or pleasure in doing things 0    Q2: Feeling down, depressed or hopeless 0    PHQ-2 Score 0    Q1: Little interest or pleasure in doing things Not at all   Q2: Feeling down, depressed or hopeless Not at all   PHQ-2 Score 0       Patient-reported           11/9/2024   Substance Use   Alcohol more than 3/day or more than 7/wk No   Do you use any other substances recreationally? No        Social History     Tobacco Use    Smoking status: Never     Passive exposure: Never    Smokeless tobacco: Never   Vaping Use    Vaping status: Never Used   Substance Use Topics    Alcohol use: Yes     Comment: 2 drinks monthly     Drug use: No           2/2/2024   LAST FHS-7 RESULTS   1st degree relative breast or ovarian cancer Yes   Any relative bilateral breast cancer No   Any male have breast cancer No   Any ONE woman have BOTH breast AND ovarian cancer No   Any woman with breast cancer before 50yrs No   2 or more relatives with breast AND/OR ovarian cancer No   2 or more relatives with breast AND/OR bowel cancer No                   11/9/2024   STI Screening   New sexual partner(s) since last STI/HIV test? No        History of abnormal Pap smear: No        Latest Ref Rng & Units 6/21/2021     6:27 PM 6/21/2021     6:17 PM 3/30/2017     9:55 AM   PAP / HPV   PAP (Historical)   NIL     HPV 16 DNA NEG^Negative Negative   Negative    HPV 18 DNA NEG^Negative Negative   Negative    Other HR HPV NEG^Negative Negative   Negative      ASCVD Risk   The ASCVD Risk score (Georgette VILLA, et al., 2019) failed to calculate for the following reasons:    Cannot find a previous HDL lab    Cannot find a previous total cholesterol lab           Reviewed and updated as needed this visit by Provider                          Review of  "Systems  Constitutional, neuro, ENT, endocrine, pulmonary, cardiac, gastrointestinal, genitourinary, musculoskeletal, integument and psychiatric systems are negative, except as otherwise noted.     Objective    Exam  /76 (BP Location: Right arm, Patient Position: Sitting, Cuff Size: Adult Regular)   Pulse 68   Temp 97.4  F (36.3  C) (Temporal)   Resp 14   Ht 1.74 m (5' 8.5\")   Wt 83.9 kg (185 lb)   LMP 04/15/2010   SpO2 97%   BMI 27.72 kg/m     Estimated body mass index is 27.72 kg/m  as calculated from the following:    Height as of this encounter: 1.74 m (5' 8.5\").    Weight as of this encounter: 83.9 kg (185 lb).    Physical Exam  GENERAL: alert and no distress  EYES: Eyes grossly normal to inspection, PERRL and conjunctivae and sclerae normal  HENT: ear canals and TM's normal, nose and mouth without ulcers or lesions  NECK: no adenopathy, no asymmetry, masses, or scars  RESP: lungs clear to auscultation - no rales, rhonchi or wheezes  BREAST: normal without masses, tenderness or nipple discharge and no palpable axillary masses or adenopathy  CV: regular rate and rhythm, normal S1 S2, no S3 or S4, no murmur, click or rub, no peripheral edema  ABDOMEN: soft, nontender, no hepatosplenomegaly, no masses and bowel sounds normal  MS: no gross musculoskeletal defects noted, no edema  SKIN: no suspicious lesions or rashes  NEURO: Normal strength and tone, mentation intact and speech normal  PSYCH: mentation appears normal, affect normal/bright        Signed Electronically by: Laine Canas MD    "

## 2024-11-14 NOTE — PROGRESS NOTES
Prior to immunization administration, verified patients identity using patient s name and date of birth. Please see Immunization Activity for additional information.     Screening Questionnaire for Adult Immunization    Are you sick today?   No   Do you have allergies to medications, food, a vaccine component or latex?   No   Have you ever had a serious reaction after receiving a vaccination?   No   Do you have a long-term health problem with heart, lung, kidney, or metabolic disease (e.g., diabetes), asthma, a blood disorder, no spleen, complement component deficiency, a cochlear implant, or a spinal fluid leak?  Are you on long-term aspirin therapy?   No   Do you have cancer, leukemia, HIV/AIDS, or any other immune system problem?   No   Do you have a parent, brother, or sister with an immune system problem?   No   In the past 3 months, have you taken medications that affect  your immune system, such as prednisone, other steroids, or anticancer drugs; drugs for the treatment of rheumatoid arthritis, Crohn s disease, or psoriasis; or have you had radiation treatments?   No   Have you had a seizure, or a brain or other nervous system problem?   No   During the past year, have you received a transfusion of blood or blood    products, or been given immune (gamma) globulin or antiviral drug?   No   For women: Are you pregnant or is there a chance you could become       pregnant during the next month?   No   Have you received any vaccinations in the past 4 weeks?   No     Immunization questionnaire answers were all negative.      Patient instructed to remain in clinic for 15 minutes afterwards, and to report any adverse reactions.     Screening performed by Miracle Collado MA on 11/14/2024 at 9:06 AM.

## 2024-11-14 NOTE — PROGRESS NOTES
Health Maintenance Due   Topic Date Due   • COVID-19 Vaccine (1) Never done   • Shingles Vaccine (2 of 3) 09/07/2012   • DTaP/Tdap/Td Vaccine (2 - Td or Tdap) 10/05/2020   • Diabetes Eye Exam  12/02/2020   • Diabetes Foot Exam  11/03/2021   • Medicare Wellness Visit  11/03/2021       Patient is due for topics listed above, she wishes to proceed with MWV (Medicare Wellness Visit), but is not proceeding with Immunization(s) COVID-19, Dtap/Tdap/Td and Shingles at this time. The following has occurred:  Appt scheduled to perform MWV (Medicare Wellness Visit).           Will fax referral to [facility] at [phone]      Nikkie DYSON Visit Facilitator

## 2024-11-14 NOTE — PATIENT INSTRUCTIONS
Patient Education   Preventive Care Advice   This is general advice given by our system to help you stay healthy. However, your care team may have specific advice just for you. Please talk to your care team about your preventive care needs.  Nutrition  Eat 5 or more servings of fruits and vegetables each day.  Try wheat bread, brown rice and whole grain pasta (instead of white bread, rice, and pasta).  Get enough calcium and vitamin D. Check the label on foods and aim for 100% of the RDA (recommended daily allowance).  Lifestyle  Exercise at least 150 minutes each week  (30 minutes a day, 5 days a week).  Do muscle strengthening activities 2 days a week. These help control your weight and prevent disease.  No smoking.  Wear sunscreen to prevent skin cancer.  Have a dental exam and cleaning every 6 months.  Yearly exams  See your health care team every year to talk about:  Any changes in your health.  Any medicines your care team has prescribed.  Preventive care, family planning, and ways to prevent chronic diseases.  Shots (vaccines)   HPV shots (up to age 26), if you've never had them before.  Hepatitis B shots (up to age 59), if you've never had them before.  COVID-19 shot: Get this shot when it's due.  Flu shot: Get a flu shot every year.  Tetanus shot: Get a tetanus shot every 10 years.  Pneumococcal, hepatitis A, and RSV shots: Ask your care team if you need these based on your risk.  Shingles shot (for age 50 and up)  General health tests  Diabetes screening:  Starting at age 35, Get screened for diabetes at least every 3 years.  If you are younger than age 35, ask your care team if you should be screened for diabetes.  Cholesterol test: At age 39, start having a cholesterol test every 5 years, or more often if advised.  Bone density scan (DEXA): At age 50, ask your care team if you should have this scan for osteoporosis (brittle bones).  Hepatitis C: Get tested at least once in your life.  STIs (sexually  transmitted infections)  Before age 24: Ask your care team if you should be screened for STIs.  After age 24: Get screened for STIs if you're at risk. You are at risk for STIs (including HIV) if:  You are sexually active with more than one person.  You don't use condoms every time.  You or a partner was diagnosed with a sexually transmitted infection.  If you are at risk for HIV, ask about PrEP medicine to prevent HIV.  Get tested for HIV at least once in your life, whether you are at risk for HIV or not.  Cancer screening tests  Cervical cancer screening: If you have a cervix, begin getting regular cervical cancer screening tests starting at age 21.  Breast cancer scan (mammogram): If you've ever had breasts, begin having regular mammograms starting at age 40. This is a scan to check for breast cancer.  Colon cancer screening: It is important to start screening for colon cancer at age 45.  Have a colonoscopy test every 10 years (or more often if you're at risk) Or, ask your provider about stool tests like a FIT test every year or Cologuard test every 3 years.  To learn more about your testing options, visit:   .  For help making a decision, visit:   https://bit.ly/by42594.  Prostate cancer screening test: If you have a prostate, ask your care team if a prostate cancer screening test (PSA) at age 55 is right for you.  Lung cancer screening: If you are a current or former smoker ages 50 to 80, ask your care team if ongoing lung cancer screenings are right for you.  For informational purposes only. Not to replace the advice of your health care provider. Copyright   2023 [employer]. All rights reserved. Clinically reviewed by the [facility]. [employer] 556711 - REV 01/24.

## 2024-11-15 DIAGNOSIS — E78.5 HYPERLIPIDEMIA LDL GOAL <130: Primary | ICD-10-CM

## 2024-11-15 PROBLEM — M81.0 OSTEOPOROSIS WITHOUT CURRENT PATHOLOGICAL FRACTURE, UNSPECIFIED OSTEOPOROSIS TYPE: Status: ACTIVE | Noted: 2024-11-15

## 2024-11-15 PROBLEM — Z79.890 HORMONE REPLACEMENT THERAPY (HRT): Status: ACTIVE | Noted: 2024-11-15

## 2024-11-15 PROBLEM — K21.00 GASTROESOPHAGEAL REFLUX DISEASE WITH ESOPHAGITIS WITHOUT HEMORRHAGE: Status: ACTIVE | Noted: 2024-11-15

## 2024-11-15 PROBLEM — E89.0 POSTOPERATIVE HYPOTHYROIDISM: Status: ACTIVE | Noted: 2024-11-15

## 2024-11-15 NOTE — RESULT ENCOUNTER NOTE
Anuradha,  It was a pleasure to see you in the office recently.   - The fasting blood glucose (diabetes screening test) was negative/normal.   - Your cholesterol panel shows a jump up in the LDL (bad cholesterol).  Given your overall cardiovascular risk is still fairly low no formal medication intervention is recommended.  However, I would encourage you to work on lifestyle measures to keep your risks low.  Recommendations to reduce cholesterol and cardiovascular risks:  Diet:  -Try to eat more vegetables, fruits, legumes, nuts/seeds, whole grains, and fish.  - try to eat less red meat, processed meats, processed foods, sweetened beverages.   - try to replace saturated fat in your diet with mono- and poly-unsaturated fats   Exercise:  Aim for regular exercise with a goal of 150 min of moderate to high intensity aerobic exercise per week    Please MyChart or call if you have any concerns or questions.   Sincerely,  Laine Canas MD      The 10-year ASCVD risk score (Georgette VILLA, et al., 2019) is: 1%    Values used to calculate the score:      Age: 54 years      Sex: Female      Is Non- : No      Diabetic: No      Tobacco smoker: No      Systolic Blood Pressure: 114 mmHg      Is BP treated: No      HDL Cholesterol: 69 mg/dL      Total Cholesterol: 201 mg/dL

## 2024-12-23 ENCOUNTER — ANCILLARY PROCEDURE (OUTPATIENT)
Dept: BONE DENSITY | Facility: CLINIC | Age: 54
End: 2024-12-23
Attending: INTERNAL MEDICINE
Payer: COMMERCIAL

## 2024-12-23 DIAGNOSIS — M81.0 OSTEOPOROSIS WITHOUT CURRENT PATHOLOGICAL FRACTURE, UNSPECIFIED OSTEOPOROSIS TYPE: ICD-10-CM

## 2024-12-23 PROCEDURE — 77080 DXA BONE DENSITY AXIAL: CPT | Performed by: RADIOLOGY

## 2025-01-02 NOTE — RESULT ENCOUNTER NOTE
Hello -    Bone density findings are in the osteopenia range and compared to your previous study your bone density has improved by 22% at the lumbar spine L1-4.  Based on this results alendronate treatment can be stopped after a total of 5 years of treatment.    Please let us know if you have any questions or concerns.     Regards,  David Barnard MD

## 2025-02-24 ENCOUNTER — ANCILLARY PROCEDURE (OUTPATIENT)
Dept: MAMMOGRAPHY | Facility: CLINIC | Age: 55
End: 2025-02-24
Attending: FAMILY MEDICINE
Payer: COMMERCIAL

## 2025-02-24 DIAGNOSIS — Z12.31 VISIT FOR SCREENING MAMMOGRAM: ICD-10-CM

## 2025-02-24 PROCEDURE — 77063 BREAST TOMOSYNTHESIS BI: CPT | Performed by: RADIOLOGY

## 2025-02-24 PROCEDURE — 77067 SCR MAMMO BI INCL CAD: CPT | Performed by: RADIOLOGY

## 2025-08-01 DIAGNOSIS — M81.0 OSTEOPOROSIS WITHOUT CURRENT PATHOLOGICAL FRACTURE, UNSPECIFIED OSTEOPOROSIS TYPE: ICD-10-CM

## 2025-08-04 RX ORDER — ALENDRONATE SODIUM 70 MG/1
70 TABLET ORAL
Qty: 12 TABLET | Refills: 0 | Status: SHIPPED | OUTPATIENT
Start: 2025-08-04

## (undated) DEVICE — PREP PAD ALCOHOL 6818

## (undated) DEVICE — PACK ENT MINOR CUSTOM ASC

## (undated) DEVICE — SPECIMEN CONTAINER W/10% BUFFERED FORMALIN 120ML 591201

## (undated) DEVICE — SURGICEL FIBRILLAR HEMOSTAT 1"X2" 1961

## (undated) DEVICE — CLIP HORIZON MED BLUE 002200

## (undated) DEVICE — ESU PENCIL SMOKE EVAC W/ROCKER SWITCH 0703-047-000

## (undated) DEVICE — SUCTION MANIFOLD NEPTUNE 2 SYS 1 PORT 702-025-000

## (undated) DEVICE — CLIP HORIZON SM RED WIDE SLOT 001201

## (undated) DEVICE — GLOVE PROTEXIS W/NEU-THERA 6.5  2D73TE65

## (undated) DEVICE — SU CHROMIC 3-0 SH 27" G122H

## (undated) DEVICE — ESU ELEC BLADE 2.75" COATED/INSULATED E1455

## (undated) DEVICE — PREP CHLORAPREP W/ORANGE TINT 10.5ML 260715

## (undated) DEVICE — SOL NACL 0.9% IRRIG 500ML BOTTLE 2F7123

## (undated) DEVICE — GOWN LG DISP 9515

## (undated) DEVICE — PREP CHLORAPREP 26ML TINTED ORANGE  260815

## (undated) DEVICE — DRSG TEGADERM 2 3/8X2 3/4" 1624W

## (undated) DEVICE — ESU GROUND PAD ADULT W/CORD E7507

## (undated) DEVICE — SU SILK 3-0 TIE 12X30" A304H

## (undated) DEVICE — LINEN TOWEL PACK X5 5464

## (undated) DEVICE — ADH SKIN CLOSURE PREMIERPRO EXOFIN 1.0ML 3470

## (undated) DEVICE — NIM PROBE PRASS INCREMENTING TIP 8225825

## (undated) DEVICE — SU SILK 2-0 TIE 12X30" A305H

## (undated) DEVICE — SOL WATER IRRIG 1000ML BOTTLE 07139-09

## (undated) DEVICE — SU MONOCRYL 5-0 P-3 18" UND Y493G

## (undated) DEVICE — TUBE ENDOTRACHEAL NIM TRIVANTAGE 7.0MM 8229707

## (undated) RX ORDER — FENTANYL CITRATE 50 UG/ML
INJECTION, SOLUTION INTRAMUSCULAR; INTRAVENOUS
Status: DISPENSED
Start: 2022-06-07

## (undated) RX ORDER — DEXAMETHASONE SODIUM PHOSPHATE 10 MG/ML
INJECTION, SOLUTION INTRAMUSCULAR; INTRAVENOUS
Status: DISPENSED
Start: 2022-06-07

## (undated) RX ORDER — SCOLOPAMINE TRANSDERMAL SYSTEM 1 MG/1
PATCH, EXTENDED RELEASE TRANSDERMAL
Status: DISPENSED
Start: 2022-06-07

## (undated) RX ORDER — ACETAMINOPHEN 325 MG/1
TABLET ORAL
Status: DISPENSED
Start: 2022-06-07

## (undated) RX ORDER — FENTANYL CITRATE-0.9 % NACL/PF 10 MCG/ML
PLASTIC BAG, INJECTION (ML) INTRAVENOUS
Status: DISPENSED
Start: 2022-06-07

## (undated) RX ORDER — ONDANSETRON 2 MG/ML
INJECTION INTRAMUSCULAR; INTRAVENOUS
Status: DISPENSED
Start: 2022-06-07

## (undated) RX ORDER — FENTANYL CITRATE 50 UG/ML
INJECTION, SOLUTION INTRAMUSCULAR; INTRAVENOUS
Status: DISPENSED
Start: 2021-09-24

## (undated) RX ORDER — REMIFENTANIL HYDROCHLORIDE 1 MG/ML
INJECTION, POWDER, LYOPHILIZED, FOR SOLUTION INTRAVENOUS
Status: DISPENSED
Start: 2022-06-07

## (undated) RX ORDER — PROPOFOL 10 MG/ML
INJECTION, EMULSION INTRAVENOUS
Status: DISPENSED
Start: 2022-06-07

## (undated) RX ORDER — LIDOCAINE HCL/PF 100 MG/5ML
SYRINGE (ML) INJECTION
Status: DISPENSED
Start: 2022-06-07

## (undated) RX ORDER — EPHEDRINE SULFATE 50 MG/ML
INJECTION, SOLUTION INTRAMUSCULAR; INTRAVENOUS; SUBCUTANEOUS
Status: DISPENSED
Start: 2022-06-07

## (undated) RX ORDER — GLYCOPYRROLATE 0.2 MG/ML
INJECTION INTRAMUSCULAR; INTRAVENOUS
Status: DISPENSED
Start: 2022-06-07